# Patient Record
Sex: MALE | Race: WHITE | Employment: FULL TIME | URBAN - METROPOLITAN AREA
[De-identification: names, ages, dates, MRNs, and addresses within clinical notes are randomized per-mention and may not be internally consistent; named-entity substitution may affect disease eponyms.]

---

## 2017-01-25 ENCOUNTER — OFFICE VISIT (OUTPATIENT)
Dept: HEMATOLOGY/ONCOLOGY | Facility: HOSPITAL | Age: 62
End: 2017-01-25
Attending: INTERNAL MEDICINE
Payer: COMMERCIAL

## 2017-01-25 VITALS
TEMPERATURE: 98 F | HEART RATE: 84 BPM | WEIGHT: 204 LBS | BODY MASS INDEX: 29.2 KG/M2 | DIASTOLIC BLOOD PRESSURE: 89 MMHG | RESPIRATION RATE: 20 BRPM | HEIGHT: 70 IN | SYSTOLIC BLOOD PRESSURE: 140 MMHG

## 2017-01-25 DIAGNOSIS — C50.121 MALIGNANT NEOPLASM OF CENTRAL PORTION OF RIGHT MALE BREAST (HCC): Primary | ICD-10-CM

## 2017-01-25 PROCEDURE — 99214 OFFICE O/P EST MOD 30 MIN: CPT | Performed by: INTERNAL MEDICINE

## 2017-01-25 NOTE — PROGRESS NOTES
Pt here for follow up. He is taking tamoxifen. Pt complains of upper right back pain for the past 10  - 12 days.

## 2017-01-25 NOTE — PROGRESS NOTES
Cancer Center Progress Note    Problem List:      Patient Active Problem List:     Prostate cancer ([facility] [address]     Pain in joint, ankle and foot     Hyperlipidemia     Vitamin D deficiency     Insomnia     Hemangioma     Nephrolithiasis     Malignant neoplasm of scar is well healed with no evidence of recurrence. The left breast has no mass or skin lesion. Abdomen: Soft, non tender with good bowel sounds. Extremities: No edema. Neurological: Grossly intact. Lymphatics:  There is no palpable lymphadenopathy thr

## 2017-01-25 NOTE — PROGRESS NOTES
Education Record    Learner:  Patient    Disease / Diagnosis:    Barriers / Limitations:  None   Comments:    Method:  Brief focused   Comments:    General Topics:  medication   Comments:    Outcome:  Shows understanding   Comments:

## 2017-02-02 ENCOUNTER — HOSPITAL ENCOUNTER (OUTPATIENT)
Dept: MAMMOGRAPHY | Age: 62
Discharge: HOME | End: 2017-02-02
Attending: INTERNAL MEDICINE
Payer: COMMERCIAL

## 2017-02-02 DIAGNOSIS — C50.121 MALIGNANT NEOPLASM OF CENTRAL PORTION OF RIGHT MALE BREAST (HCC): ICD-10-CM

## 2017-02-02 PROCEDURE — 77061 BREAST TOMOSYNTHESIS UNI: CPT

## 2017-02-02 PROCEDURE — 77065 DX MAMMO INCL CAD UNI: CPT

## 2017-03-23 ENCOUNTER — OFFICE VISIT (OUTPATIENT)
Dept: FAMILY MEDICINE CLINIC | Facility: CLINIC | Age: 62
End: 2017-03-23

## 2017-03-23 VITALS
BODY MASS INDEX: 31.04 KG/M2 | DIASTOLIC BLOOD PRESSURE: 72 MMHG | TEMPERATURE: 98 F | HEART RATE: 62 BPM | HEIGHT: 69.25 IN | WEIGHT: 212 LBS | SYSTOLIC BLOOD PRESSURE: 118 MMHG | RESPIRATION RATE: 14 BRPM

## 2017-03-23 DIAGNOSIS — F51.01 PRIMARY INSOMNIA: ICD-10-CM

## 2017-03-23 DIAGNOSIS — R06.83 SNORING: ICD-10-CM

## 2017-03-23 DIAGNOSIS — E55.9 VITAMIN D DEFICIENCY: ICD-10-CM

## 2017-03-23 DIAGNOSIS — E78.2 MIXED HYPERLIPIDEMIA: Primary | ICD-10-CM

## 2017-03-23 PROCEDURE — 99214 OFFICE O/P EST MOD 30 MIN: CPT | Performed by: FAMILY MEDICINE

## 2017-03-23 RX ORDER — ZOLPIDEM TARTRATE 12.5 MG/1
TABLET, FILM COATED, EXTENDED RELEASE ORAL
Qty: 30 TABLET | Refills: 0 | Status: CANCELLED | OUTPATIENT
Start: 2017-03-23

## 2017-03-23 RX ORDER — ZOLPIDEM TARTRATE 12.5 MG/1
12.5 TABLET, FILM COATED, EXTENDED RELEASE ORAL NIGHTLY PRN
Qty: 30 TABLET | Refills: 5 | Status: SHIPPED | OUTPATIENT
Start: 2017-03-23 | End: 2017-09-22

## 2017-03-23 NOTE — PROGRESS NOTES
HPI:   Patient presents with:  Hyperlipidemia: 6 month f/u   Bump: Pt had a bumpd removed for biopsy on left thigh and still has scab and would like scab looked at. Jessica Restrepo is a 62year old male who presents for recheck of his hypertension.  H neoplasm of breast on his problem list.   He  has past surgical history that includes leg/ankle surgery proc unlisted (1978 1989); hand/finger surgery unlisted; femur/knee surg unlisted; shoulder surg proc unlisted (2005); repair of biceps tendon at elbow time.  Healing lesion on thigh stable    ASSESSMENT AND PLAN:   Diogenes Bryson is a 62year old male who presents for a recheck of his hypertension, Blood Pressure is well controlled, no significant medication side effects noted.      PLAN: will continue p

## 2017-03-24 NOTE — ASSESSMENT & PLAN NOTE
Stable, Continue present management.     Cholesterol Lowering Medications          Atorvastatin Calcium 40 MG Oral Tab

## 2017-04-04 ENCOUNTER — TELEPHONE (OUTPATIENT)
Dept: HEMATOLOGY/ONCOLOGY | Facility: HOSPITAL | Age: 62
End: 2017-04-04

## 2017-04-04 NOTE — TELEPHONE ENCOUNTER
Pt states he is getting a skin cancer taken out on his right shoulder and right forearm and that is the same side he had the breast cancer. Does he need to take any precautions?

## 2017-04-05 NOTE — TELEPHONE ENCOUNTER
He should have routine management. He is at higher risk for lymphedema but we will just need to follow for this and intervene with lymphedema clinic if necessary. -- per Dr. Waylon Dotson    Pt informed and verbalized understanding.

## 2017-04-11 ENCOUNTER — OFFICE VISIT (OUTPATIENT)
Dept: SLEEP CENTER | Facility: HOSPITAL | Age: 62
End: 2017-04-11
Attending: FAMILY MEDICINE
Payer: COMMERCIAL

## 2017-04-11 PROCEDURE — 95810 POLYSOM 6/> YRS 4/> PARAM: CPT

## 2017-04-13 NOTE — PROCEDURES
[address] [address]       Accredited by the [facility] of [facility] (AASM)    PATIENT'S NAME:        Zofia Pena  ATTENDING PHYSICIAN:   Eliana Myers M.D. REFERRING PHYSICIAN:   Aldair Rios M.D.   MARY ELLEN sleep, was seen in decreased amounts comprising 0.3% of sleep. Slow wave sleep was absent during this study. REM sleep was seen in decreased amounts comprising 16.6% of sleep.       RESPIRATORY MEASURES:  Respiratory monitoring showed obstructive apneas a

## 2017-04-25 ENCOUNTER — MED REC SCAN ONLY (OUTPATIENT)
Dept: FAMILY MEDICINE CLINIC | Facility: CLINIC | Age: 62
End: 2017-04-25

## 2017-05-24 ENCOUNTER — OFFICE VISIT (OUTPATIENT)
Dept: FAMILY MEDICINE CLINIC | Facility: CLINIC | Age: 62
End: 2017-05-24

## 2017-05-24 VITALS
HEIGHT: 69 IN | HEART RATE: 72 BPM | TEMPERATURE: 98 F | DIASTOLIC BLOOD PRESSURE: 80 MMHG | WEIGHT: 214 LBS | RESPIRATION RATE: 16 BRPM | SYSTOLIC BLOOD PRESSURE: 118 MMHG | BODY MASS INDEX: 31.7 KG/M2

## 2017-05-24 DIAGNOSIS — G47.33 OSA ON CPAP: Primary | ICD-10-CM

## 2017-05-24 DIAGNOSIS — J01.01 ACUTE RECURRENT MAXILLARY SINUSITIS: ICD-10-CM

## 2017-05-24 DIAGNOSIS — Z99.89 OSA ON CPAP: Primary | ICD-10-CM

## 2017-05-24 PROCEDURE — 99213 OFFICE O/P EST LOW 20 MIN: CPT | Performed by: FAMILY MEDICINE

## 2017-05-24 RX ORDER — CEFUROXIME AXETIL 250 MG/1
250 TABLET ORAL 2 TIMES DAILY
Qty: 20 TABLET | Refills: 0 | Status: SHIPPED | OUTPATIENT
Start: 2017-05-24 | End: 2017-06-16

## 2017-05-24 NOTE — PROGRESS NOTES
Patient presents with:  Test Results: Pt is here to discuss sleep study results  Sinus Problem: x1-2 months w/ pain above the gum line and below the eye and sometimes wakes up with left eye crusty.       HPI:   This is a 62year old male coming in for sleep nystagmus  Neck: Supple, no obvious thyromegaly   Cardiac: S1, S2 normal,    Lungs: good chest wall expansion, clear   Abdomen: Soft, non-tender.  No palpable organs or masses   Extremities: No edema, peripheral pulses +2   Neurologic: mental status grossly

## 2017-05-24 NOTE — ASSESSMENT & PLAN NOTE
Long discussion about treatment for sleep apnea, I will arrange for a CPAP titration and he is ready for CPAP because of side effects.

## 2017-06-11 ENCOUNTER — OFFICE VISIT (OUTPATIENT)
Dept: SLEEP CENTER | Facility: HOSPITAL | Age: 62
End: 2017-06-11
Attending: FAMILY MEDICINE
Payer: COMMERCIAL

## 2017-06-11 PROCEDURE — 95811 POLYSOM 6/>YRS CPAP 4/> PARM: CPT

## 2017-06-15 NOTE — PROCEDURES
[address] [address]       Accredited by the [facility] of [facility] Medicine (AASM)    PATIENT'S NAME:        Shana Riley  ATTENDING PHYSICIAN:   Akil Kirkland M.D. REFERRING PHYSICIAN:   Barbie Syed M.D.   MARY ELLEN was normal at 13 minutes. The amount of stage 1 or light sleep was seen in decreased amounts as it was not observed during this night's study. Slow-wave sleep was seen in decreased amounts comprising 10.3% of sleep.   REM sleep was seen in decreased amoun 16:01:24  [facility] [id number]  AK/    cc: Neal Rodriguez M.D.

## 2017-07-07 ENCOUNTER — OFFICE VISIT (OUTPATIENT)
Dept: SURGERY | Facility: CLINIC | Age: 62
End: 2017-07-07

## 2017-07-07 ENCOUNTER — TELEPHONE (OUTPATIENT)
Dept: FAMILY MEDICINE CLINIC | Facility: CLINIC | Age: 62
End: 2017-07-07

## 2017-07-07 ENCOUNTER — OFFICE VISIT (OUTPATIENT)
Dept: FAMILY MEDICINE CLINIC | Facility: CLINIC | Age: 62
End: 2017-07-07

## 2017-07-07 VITALS
OXYGEN SATURATION: 96 % | DIASTOLIC BLOOD PRESSURE: 80 MMHG | HEART RATE: 80 BPM | BODY MASS INDEX: 33 KG/M2 | SYSTOLIC BLOOD PRESSURE: 128 MMHG | RESPIRATION RATE: 18 BRPM | TEMPERATURE: 99 F | WEIGHT: 220.19 LBS

## 2017-07-07 VITALS
HEIGHT: 70 IN | HEART RATE: 80 BPM | WEIGHT: 220 LBS | DIASTOLIC BLOOD PRESSURE: 94 MMHG | SYSTOLIC BLOOD PRESSURE: 132 MMHG | RESPIRATION RATE: 14 BRPM | TEMPERATURE: 99 F | BODY MASS INDEX: 31.5 KG/M2

## 2017-07-07 DIAGNOSIS — R39.9 UTI SYMPTOMS: Primary | ICD-10-CM

## 2017-07-07 DIAGNOSIS — J01.01 ACUTE RECURRENT MAXILLARY SINUSITIS: ICD-10-CM

## 2017-07-07 DIAGNOSIS — C50.121 MALIGNANT NEOPLASM OF CENTRAL PORTION OF RIGHT BREAST IN MALE, ESTROGEN RECEPTOR POSITIVE (HCC): ICD-10-CM

## 2017-07-07 DIAGNOSIS — Z12.31 ENCOUNTER FOR SCREENING MAMMOGRAM FOR MALIGNANT NEOPLASM OF BREAST: Primary | ICD-10-CM

## 2017-07-07 DIAGNOSIS — Z17.0 MALIGNANT NEOPLASM OF CENTRAL PORTION OF RIGHT BREAST IN MALE, ESTROGEN RECEPTOR POSITIVE (HCC): ICD-10-CM

## 2017-07-07 LAB
BILIRUBIN: NEGATIVE
GLUCOSE (URINE DIPSTICK): NEGATIVE MG/DL
KETONES (URINE DIPSTICK): NEGATIVE MG/DL
MULTISTIX LOT#: ABNORMAL NUMERIC
NITRITE, URINE: NEGATIVE
PH, URINE: 6.5 (ref 4.5–8)
PROTEIN (URINE DIPSTICK): 30 MG/DL
SPECIFIC GRAVITY: 1.01 (ref 1–1.03)
UROBILINOGEN,SEMI-QN: 0.2 MG/DL (ref 0–1.9)

## 2017-07-07 PROCEDURE — 81003 URINALYSIS AUTO W/O SCOPE: CPT | Performed by: NURSE PRACTITIONER

## 2017-07-07 PROCEDURE — 99213 OFFICE O/P EST LOW 20 MIN: CPT | Performed by: NURSE PRACTITIONER

## 2017-07-07 PROCEDURE — 87086 URINE CULTURE/COLONY COUNT: CPT | Performed by: NURSE PRACTITIONER

## 2017-07-07 PROCEDURE — 87186 SC STD MICRODIL/AGAR DIL: CPT | Performed by: NURSE PRACTITIONER

## 2017-07-07 PROCEDURE — 87088 URINE BACTERIA CULTURE: CPT | Performed by: NURSE PRACTITIONER

## 2017-07-07 PROCEDURE — 99214 OFFICE O/P EST MOD 30 MIN: CPT | Performed by: SURGERY

## 2017-07-07 RX ORDER — SULFAMETHOXAZOLE AND TRIMETHOPRIM 800; 160 MG/1; MG/1
1 TABLET ORAL 2 TIMES DAILY
Qty: 28 TABLET | Refills: 0 | Status: SHIPPED | OUTPATIENT
Start: 2017-07-07 | End: 2017-07-21

## 2017-07-07 NOTE — PROGRESS NOTES
CHIEF COMPLAINT:   Patient presents with:  UTI: symptoms x 4 days      HPI:   Jesse Gale is a 62year old male who presents with symptoms of UTI.  Pt c/o mild bilateral low back pain, dysuria, urinary frequency, blood in urine, x 4 days, this am urin • Squamous cell carcinoma 4/8/2014    in-situ, mid upper chest   • Squamous cell carcinoma 9/26/2013    in-situ, left upper arm; in-situ, left forearm   • Squamous cell carcinoma 8/22/2013    in-situ, right posterior neck; in-situ, right upper arm      Soc [employer] Lot# [id number] Numeric   Multistix Expiration Date 10/2,017 Date         ASSESSMENT AND PLAN:   Diana Ashraf is a 62year old male presents with UTI symptoms.     ASSESSMENT:  Uti symptoms  (primary encounter diagnosis)  Acute recurrent maxillary Applying heat to the area surrounding your sinuses may make you feel more comfortable. Use a hot water bottle or a hand towel dipped in hot water. Some people also find ice packs effective for relieving pain.   Medicines  Your doctor may prescribe medicatio · Cystitis is usually caused by a UTI. · Not all UTIs and cases of cystitis are bladder infections. · Bladder infections are the most common type of cystitis. Symptoms of a bladder infection  The infection causes inflammation in the urethra and bladder. · You may have been given phenazopyridine to ease burning when you urinate. It will cause your urine to be bright orange. It can stain clothing. · You may have been prescribed antibiotics.  Take this medicine until you have finished it, even if you feel be Follow up with your healthcare provider, or as advised if all symptoms have not cleared up within 5 days. It is important to keep your follow-up appointment.  You can talk with your provider to see if you need more tests of the urinary tract. This is especi

## 2017-07-07 NOTE — PATIENT INSTRUCTIONS
Self-Care for Sinusitis     Drinking plenty of water can help sinuses drain. Sinusitis can often be managed with self-care. Self-care can keep sinuses moist and make you feel more comfortable. Remember to follow your doctor's instructions closely.  This Bladder Infection, Male (Adult)    You have a bladder infection. Urine is normally free of bacteria. But bacteria can get into the urinary tract from the skin around the rectum or it may travel in the blood from elsewhere in the body.   This is called a ur The most common cause of bladder infections is bacteria from the bowels. The bacteria get onto the skin around the opening of the urethra. From there they can get into the urine and travel up to the bladder. This causes inflammation and an infection.  This · Drink plenty of fluids, unless your healthcare provider told you not to. Fluids will prevent dehydration and flush out your bladder. · Use good personal hygiene. Wipe from front to back after using the toilet, and clean your penis regularly.  If you aren © 1240-9250 [address], [address] [place]. All rights reserved. This information is not intended as a substitute for professional medical care. Always follow your healthcare professional's instructions.

## 2017-07-07 NOTE — TELEPHONE ENCOUNTER
Patient woke up and there was blood in his urine, currently at the hospital and was wondering if he could get an order to have a urine sample done

## 2017-07-07 NOTE — TELEPHONE ENCOUNTER
Patient c/o lower back pain x 4 days - had blood in urine this morning. H/o prostate and breast ca. Patient wanting to come in to drop-off a urine sample. Pt advised we typically like to do an assessment before ordering testing.  Patient is willing to go to

## 2017-07-07 NOTE — PROGRESS NOTES
Breast Surgery Follow-Up Visit    Diagnosis: Infiltrating Ductal Carcinoma, right breast; ER positive, [place] positive status post modified radical mastectomy without reconstruction on December 15, 2015.     Stage: [id number] (Stage IB)    Disease Status:  Ramin Armas PROSTECTOMY   • Basal cell carcinoma 1/3/2015    left upper arm   • Basal cell carcinoma 12/12/2012    right shoulder; left forearm   • Basal cell carcinoma 06/19/2002    right forehead   • Calculus of kidney    • Personal history of arthritis    • Prostat Rfl: 11   VIAGRA 100 MG Oral Tab as needed. Disp:  Rfl: 11     No current facility-administered medications on file prior to visit. Allergies:      Levaquin [Levofloxa*    Swelling    Comment:Other reaction(s):  Other             Insomnia; swelling wheezing, difficulty in breathing with exertion, emphysema, chronic bronchitis, shortness of breast or abnormal sound when breathing. Cardiovascular:   There is no history of chest pain, chest pressure/discomfort, palpitations, irregular heartbeat, erwin anaphylaxis. /80 (BP Location: Left arm, Patient Position: Sitting, Cuff Size: adult)   Pulse 80   Temp 99 °F (37.2 °C) (Tympanic)   Resp 18   Wt 99.9 kg (220 lb 3.2 oz)   SpO2 96%   BMI 32.52 kg/m²     Physical Examination:   This is a well-nouris

## 2017-07-09 NOTE — PROGRESS NOTES
Called pt to discuss results. Pt feeling better on treatment. Will monitor and call/return to clinic if sx increase. Pt voiced understanding.

## 2017-07-23 DIAGNOSIS — C61 PROSTATE CANCER (HCC): Primary | ICD-10-CM

## 2017-07-24 RX ORDER — TAMOXIFEN CITRATE 20 MG/1
20 TABLET ORAL DAILY
Qty: 30 TABLET | Refills: 11 | Status: SHIPPED | OUTPATIENT
Start: 2017-07-24 | End: 2018-07-05

## 2017-07-26 ENCOUNTER — APPOINTMENT (OUTPATIENT)
Dept: HEMATOLOGY/ONCOLOGY | Facility: HOSPITAL | Age: 62
End: 2017-07-26
Attending: INTERNAL MEDICINE
Payer: COMMERCIAL

## 2017-07-28 ENCOUNTER — OFFICE VISIT (OUTPATIENT)
Dept: FAMILY MEDICINE CLINIC | Facility: CLINIC | Age: 62
End: 2017-07-28

## 2017-07-28 VITALS
WEIGHT: 211 LBS | HEIGHT: 70 IN | SYSTOLIC BLOOD PRESSURE: 136 MMHG | RESPIRATION RATE: 16 BRPM | TEMPERATURE: 99 F | BODY MASS INDEX: 30.21 KG/M2 | DIASTOLIC BLOOD PRESSURE: 80 MMHG | HEART RATE: 80 BPM

## 2017-07-28 DIAGNOSIS — E55.9 VITAMIN D DEFICIENCY: ICD-10-CM

## 2017-07-28 DIAGNOSIS — N45.1 EPIDIDYMITIS: Primary | ICD-10-CM

## 2017-07-28 DIAGNOSIS — Z00.00 LABORATORY EXAMINATION ORDERED AS PART OF A ROUTINE GENERAL MEDICAL EXAMINATION: ICD-10-CM

## 2017-07-28 DIAGNOSIS — J01.01 ACUTE RECURRENT MAXILLARY SINUSITIS: ICD-10-CM

## 2017-07-28 PROCEDURE — 99213 OFFICE O/P EST LOW 20 MIN: CPT | Performed by: FAMILY MEDICINE

## 2017-07-28 RX ORDER — AMOXICILLIN AND CLAVULANATE POTASSIUM 875; 125 MG/1; MG/1
1 TABLET, FILM COATED ORAL 2 TIMES DAILY
Qty: 28 TABLET | Refills: 0 | Status: SHIPPED | OUTPATIENT
Start: 2017-07-28 | End: 2017-08-11

## 2017-07-28 NOTE — PROGRESS NOTES
Patient presents with:  Sinus Problem: Pt states sinus infection has not gone away would like another round of antibiotics. Eval-G (gynecologic): x3 weeks w/ groing pain mostly on right side. Pt states pain is constant. Pain level 4-5/10.        HPI:   Jade Ro diaphoresis. HENT: Positive for sinus pressure. Negative for congestion, hoarse voice and sore throat. Respiratory: Negative for cough. Cardiovascular: Negative for chest pain. Gastrointestinal: Negative for nausea and anorexia.    Genitourinary: descended. Cremasteric reflex is not absent on the right side. Left testis shows no mass, no swelling and no tenderness. Left testis is descended. Cremasteric reflex is not absent on the left side. No penile erythema or penile tenderness.  No discharge foun

## 2017-08-02 ENCOUNTER — OFFICE VISIT (OUTPATIENT)
Dept: HEMATOLOGY/ONCOLOGY | Facility: HOSPITAL | Age: 62
End: 2017-08-02
Attending: INTERNAL MEDICINE
Payer: COMMERCIAL

## 2017-08-02 VITALS
RESPIRATION RATE: 20 BRPM | HEIGHT: 70 IN | SYSTOLIC BLOOD PRESSURE: 132 MMHG | WEIGHT: 219 LBS | OXYGEN SATURATION: 97 % | DIASTOLIC BLOOD PRESSURE: 83 MMHG | TEMPERATURE: 98 F | HEART RATE: 64 BPM | BODY MASS INDEX: 31.35 KG/M2

## 2017-08-02 DIAGNOSIS — C50.121 MALIGNANT NEOPLASM OF CENTRAL PORTION OF RIGHT BREAST IN MALE, ESTROGEN RECEPTOR POSITIVE (HCC): Primary | ICD-10-CM

## 2017-08-02 DIAGNOSIS — Z17.0 MALIGNANT NEOPLASM OF CENTRAL PORTION OF RIGHT BREAST IN MALE, ESTROGEN RECEPTOR POSITIVE (HCC): Primary | ICD-10-CM

## 2017-08-02 PROCEDURE — 99214 OFFICE O/P EST MOD 30 MIN: CPT | Performed by: INTERNAL MEDICINE

## 2017-08-02 NOTE — PROGRESS NOTES
Cancer Center Progress Note    Problem List:      Patient Active Problem List:     Prostate cancer ([facility] [address]     Pain in joint, ankle and foot     Hyperlipidemia     Vitamin D deficiency     Insomnia     Hemangioma     Nephrolithiasis     Malignant neoplasm of TABLET (20 MG TOTAL) BY MOUTH DAILY. Disp: 30 tablet Rfl: 11   Zolpidem Tartrate ER (AMBIEN CR) 12.5 MG Oral Tab CR Take 1 tablet (12.5 mg total) by mouth nightly as needed for Sleep.  Disp: 30 tablet Rfl: 5   Atorvastatin Calcium 40 MG Oral Tab Take 1 tabl has a personal history of prostate cancer that was diagnosed after getting screening PSA's. The PSA had increased to 7.5. He had a radical prostatectomy in 1/2015. He had a Gleasons 7 (3+4). He had 14 negative nodes. The margins were negative.  This was st

## 2017-08-02 NOTE — PROGRESS NOTES
Pt here for follow up. Pt is on tamoxifen. He is currently on antibiotics for epididymis. Pt states he has chest pain and numbness in the arm pit.

## 2017-08-09 ENCOUNTER — LAB ENCOUNTER (OUTPATIENT)
Dept: LAB | Age: 62
End: 2017-08-09
Attending: FAMILY MEDICINE
Payer: COMMERCIAL

## 2017-08-09 ENCOUNTER — OFFICE VISIT (OUTPATIENT)
Dept: FAMILY MEDICINE CLINIC | Facility: CLINIC | Age: 62
End: 2017-08-09

## 2017-08-09 VITALS
DIASTOLIC BLOOD PRESSURE: 84 MMHG | TEMPERATURE: 99 F | BODY MASS INDEX: 31 KG/M2 | SYSTOLIC BLOOD PRESSURE: 130 MMHG | RESPIRATION RATE: 14 BRPM | HEART RATE: 72 BPM | WEIGHT: 218 LBS

## 2017-08-09 DIAGNOSIS — E55.9 VITAMIN D DEFICIENCY: ICD-10-CM

## 2017-08-09 DIAGNOSIS — G47.33 OSA ON CPAP: Primary | ICD-10-CM

## 2017-08-09 DIAGNOSIS — Z00.00 LABORATORY EXAMINATION ORDERED AS PART OF A ROUTINE GENERAL MEDICAL EXAMINATION: ICD-10-CM

## 2017-08-09 DIAGNOSIS — Z99.89 OSA ON CPAP: Primary | ICD-10-CM

## 2017-08-09 DIAGNOSIS — D70.9 NEUTROPENIA, UNSPECIFIED TYPE (HCC): ICD-10-CM

## 2017-08-09 LAB
25-HYDROXYVITAMIN D (TOTAL): 16.1 NG/ML (ref 30–100)
ALBUMIN SERPL-MCNC: 3.9 G/DL (ref 3.5–4.8)
ALP LIVER SERPL-CCNC: 52 U/L (ref 45–117)
ALT SERPL-CCNC: 32 U/L (ref 17–63)
AST SERPL-CCNC: 22 U/L (ref 15–41)
BASOPHILS # BLD AUTO: 0.03 X10(3) UL (ref 0–0.1)
BASOPHILS NFR BLD AUTO: 0.9 %
BILIRUB SERPL-MCNC: 1.2 MG/DL (ref 0.1–2)
BUN BLD-MCNC: 14 MG/DL (ref 8–20)
CALCIUM BLD-MCNC: 8.3 MG/DL (ref 8.3–10.3)
CHLORIDE: 108 MMOL/L (ref 101–111)
CHOLEST SMN-MCNC: 132 MG/DL (ref ?–200)
CO2: 28 MMOL/L (ref 22–32)
CREAT BLD-MCNC: 1.13 MG/DL (ref 0.7–1.3)
EOSINOPHIL # BLD AUTO: 0.17 X10(3) UL (ref 0–0.3)
EOSINOPHIL NFR BLD AUTO: 5.2 %
ERYTHROCYTE [DISTWIDTH] IN BLOOD BY AUTOMATED COUNT: 13.1 % (ref 11.5–16)
GLUCOSE BLD-MCNC: 92 MG/DL (ref 70–99)
HCT VFR BLD AUTO: 45.1 % (ref 37–53)
HDLC SERPL-MCNC: 45 MG/DL (ref 45–?)
HDLC SERPL: 2.93 {RATIO} (ref ?–4.97)
HGB BLD-MCNC: 15.1 G/DL (ref 13–17)
IMMATURE GRANULOCYTE COUNT: 0.04 X10(3) UL (ref 0–1)
IMMATURE GRANULOCYTE RATIO %: 1.2 %
LDLC SERPL CALC-MCNC: 66 MG/DL (ref ?–130)
LDLC SERPL-MCNC: 21 MG/DL (ref 5–40)
LYMPHOCYTES # BLD AUTO: 0.65 X10(3) UL (ref 0.9–4)
LYMPHOCYTES NFR BLD AUTO: 19.8 %
M PROTEIN MFR SERPL ELPH: 6.8 G/DL (ref 6.1–8.3)
MCH RBC QN AUTO: 31.2 PG (ref 27–33.2)
MCHC RBC AUTO-ENTMCNC: 33.5 G/DL (ref 31–37)
MCV RBC AUTO: 93.2 FL (ref 80–99)
MONOCYTES # BLD AUTO: 0.51 X10(3) UL (ref 0.1–0.6)
MONOCYTES NFR BLD AUTO: 15.5 %
NEUTROPHIL ABS PRELIM: 1.88 X10 (3) UL (ref 1.3–6.7)
NEUTROPHILS # BLD AUTO: 1.88 X10(3) UL (ref 1.3–6.7)
NEUTROPHILS NFR BLD AUTO: 57.4 %
NONHDLC SERPL-MCNC: 87 MG/DL (ref ?–130)
PLATELET # BLD AUTO: 119 10(3)UL (ref 150–450)
POTASSIUM SERPL-SCNC: 4 MMOL/L (ref 3.6–5.1)
RBC # BLD AUTO: 4.84 X10(6)UL (ref 4.3–5.7)
RED CELL DISTRIBUTION WIDTH-SD: 44.2 FL (ref 35.1–46.3)
SODIUM SERPL-SCNC: 142 MMOL/L (ref 136–144)
TRIGLYCERIDES: 106 MG/DL (ref ?–150)
TSI SER-ACNC: 2.81 MIU/ML (ref 0.35–5.5)
WBC # BLD AUTO: 3.3 X10(3) UL (ref 4–13)

## 2017-08-09 PROCEDURE — 36415 COLL VENOUS BLD VENIPUNCTURE: CPT | Performed by: FAMILY MEDICINE

## 2017-08-09 PROCEDURE — 80061 LIPID PANEL: CPT | Performed by: FAMILY MEDICINE

## 2017-08-09 PROCEDURE — 99213 OFFICE O/P EST LOW 20 MIN: CPT | Performed by: FAMILY MEDICINE

## 2017-08-09 PROCEDURE — 82306 VITAMIN D 25 HYDROXY: CPT | Performed by: FAMILY MEDICINE

## 2017-08-09 PROCEDURE — 80050 GENERAL HEALTH PANEL: CPT | Performed by: FAMILY MEDICINE

## 2017-08-09 NOTE — PROGRESS NOTES
Patient presents with:  Sleep Problem: Pt is here to f/u chaparrita having CPAP for 6 weeks. HPI:   This is a 62year old male coming in for PRICILA, not snoring, wife sleeping. Doing better and very compliant.  DOing very well, noticing some difference   Sinu PRICILA on CPAP - Primary    Overview     4/2017 study, Ger 77 %, needs tx, AHI 35 supine, and 7 side           Current Assessment & Plan     Compliant and             Digestive    Vitamin D deficiency    Overview     Low vit D.  On supplements         Rel

## 2017-08-31 ENCOUNTER — HOSPITAL ENCOUNTER (OUTPATIENT)
Dept: CT IMAGING | Age: 62
Discharge: HOME | End: 2017-08-31
Attending: OTOLARYNGOLOGY
Payer: COMMERCIAL

## 2017-08-31 DIAGNOSIS — R51.9 FACIAL PAIN: ICD-10-CM

## 2017-08-31 DIAGNOSIS — J32.0 CHRONIC MAXILLARY SINUSITIS: ICD-10-CM

## 2017-08-31 PROCEDURE — 70486 CT MAXILLOFACIAL W/O DYE: CPT | Performed by: OTOLARYNGOLOGY

## 2017-09-05 NOTE — PROGRESS NOTES
Please inform CT sinuses showing right maxillary sinus polyp with some inflammation, please find out how patient is doing with astelin twice daily, follow up in office with Dr Jessica Torrez to re examine with films

## 2017-09-12 NOTE — PROGRESS NOTES
Spoke with pt in regards to CT, pt has appt scheduled with Richie Fay on 9/14/17 @ 7:30am    Pt saw Dr. Argentina Car on 9/8/17, referred by Dr. Solomon Gandara for lip hemangioma, I did see in Dr. Etienne Headings note that pt asked about CT results and Dr. Argentina Car did go over the

## 2017-09-17 DIAGNOSIS — E78.2 MIXED HYPERLIPIDEMIA: ICD-10-CM

## 2017-09-18 RX ORDER — ATORVASTATIN CALCIUM 40 MG/1
40 TABLET, FILM COATED ORAL DAILY
Qty: 30 TABLET | Refills: 10 | Status: SHIPPED | OUTPATIENT
Start: 2017-09-18 | End: 2018-08-06

## 2017-09-18 NOTE — TELEPHONE ENCOUNTER
Signed Prescriptions Disp Refills    ATORVASTATIN 40 MG Oral Tab 30 tablet 10      Sig: TAKE 1 TABLET (40 MG TOTAL) BY MOUTH DAILY.         Authorizing Provider: Dago Cunningham        Ordering User: Mateo BARRAGAN 8/9/2017     Future Appointm

## 2017-09-21 ENCOUNTER — TELEPHONE (OUTPATIENT)
Dept: FAMILY MEDICINE CLINIC | Facility: CLINIC | Age: 62
End: 2017-09-21

## 2017-09-22 ENCOUNTER — OFFICE VISIT (OUTPATIENT)
Dept: FAMILY MEDICINE CLINIC | Facility: CLINIC | Age: 62
End: 2017-09-22

## 2017-09-22 VITALS
HEART RATE: 84 BPM | WEIGHT: 218 LBS | DIASTOLIC BLOOD PRESSURE: 62 MMHG | HEIGHT: 69.25 IN | RESPIRATION RATE: 14 BRPM | SYSTOLIC BLOOD PRESSURE: 128 MMHG | TEMPERATURE: 99 F | BODY MASS INDEX: 31.92 KG/M2

## 2017-09-22 DIAGNOSIS — F51.01 PRIMARY INSOMNIA: ICD-10-CM

## 2017-09-22 DIAGNOSIS — D69.6 THROMBOCYTOPENIA (HCC): ICD-10-CM

## 2017-09-22 DIAGNOSIS — E55.9 VITAMIN D DEFICIENCY: ICD-10-CM

## 2017-09-22 DIAGNOSIS — E78.2 MIXED HYPERLIPIDEMIA: ICD-10-CM

## 2017-09-22 DIAGNOSIS — C61 PROSTATE CANCER (HCC): ICD-10-CM

## 2017-09-22 DIAGNOSIS — Z00.00 ANNUAL PHYSICAL EXAM: Primary | ICD-10-CM

## 2017-09-22 DIAGNOSIS — G47.33 OSA ON CPAP: ICD-10-CM

## 2017-09-22 DIAGNOSIS — Z99.89 OSA ON CPAP: ICD-10-CM

## 2017-09-22 PROCEDURE — 99396 PREV VISIT EST AGE 40-64: CPT | Performed by: FAMILY MEDICINE

## 2017-09-22 RX ORDER — ZOLPIDEM TARTRATE 12.5 MG/1
12.5 TABLET, FILM COATED, EXTENDED RELEASE ORAL NIGHTLY PRN
Qty: 30 TABLET | Refills: 5 | Status: SHIPPED | OUTPATIENT
Start: 2017-09-22 | End: 2018-03-16

## 2017-09-22 NOTE — PROGRESS NOTES
Batsheva Alvares is a 62year old male who presents for a complete physical exam.   HPI:   Patient presents with:  Physical      His last annual assessment has been over 1 year: Annual Physical due on 09/22/2017       Pt complains of sinus sx and swollen li VITD 16.1 (L) 08/09/2017 08:13 AM   PSA 5.530 (H) 04/23/2014 08:39 AM              Current Outpatient Prescriptions on File Prior to Visit:  ATORVASTATIN 40 MG Oral Tab TAKE 1 TABLET (40 MG TOTAL) BY MOUTH DAILY.    Hydrocortisone-Acetic Acid 1-2 % Otic Sol He is not on any long-term medications. He is allergic to levaquin [levofloxacin]. He  reports that he has never smoked. He has never used smokeless tobacco. He reports that he drinks about 1.2 - 1.8 oz of alcohol per week .  He reports that he does Cardiovascular: Normal rate, regular rhythm, S1 normal, S2 normal, normal heart sounds, intact distal pulses and normal pulses. PMI is not displaced. Exam reveals no gallop. No murmur heard. Carotid bruit not present.   Pulmonary/Chest: Effort normal an Atorvastatin 40         Current Assessment & Plan     Stable, Continue present management.     Cholesterol Lowering Medications          ATORVASTATIN 40 MG Oral Tab                    Respiratory    PRICILA on CPAP    Overview     4/2017 study, Ger 77 %, ne

## 2017-11-21 ENCOUNTER — HOSPITAL ENCOUNTER (OUTPATIENT)
Age: 62
Discharge: HOME | End: 2017-11-21
Attending: FAMILY MEDICINE
Payer: COMMERCIAL

## 2017-11-21 VITALS
DIASTOLIC BLOOD PRESSURE: 91 MMHG | TEMPERATURE: 98 F | OXYGEN SATURATION: 98 % | HEIGHT: 70 IN | HEART RATE: 86 BPM | SYSTOLIC BLOOD PRESSURE: 140 MMHG | WEIGHT: 211 LBS | BODY MASS INDEX: 30.21 KG/M2 | RESPIRATION RATE: 18 BRPM

## 2017-11-21 DIAGNOSIS — N12 PYELONEPHRITIS: Primary | ICD-10-CM

## 2017-11-21 DIAGNOSIS — Z87.442 HISTORY OF KIDNEY STONES: ICD-10-CM

## 2017-11-21 DIAGNOSIS — Z98.890 HISTORY OF PROSTATE SURGERY: ICD-10-CM

## 2017-11-21 PROCEDURE — 87086 URINE CULTURE/COLONY COUNT: CPT | Performed by: FAMILY MEDICINE

## 2017-11-21 PROCEDURE — 81002 URINALYSIS NONAUTO W/O SCOPE: CPT | Performed by: FAMILY MEDICINE

## 2017-11-21 PROCEDURE — 99204 OFFICE O/P NEW MOD 45 MIN: CPT

## 2017-11-21 PROCEDURE — 99214 OFFICE O/P EST MOD 30 MIN: CPT

## 2017-11-21 RX ORDER — SULFAMETHOXAZOLE AND TRIMETHOPRIM 800; 160 MG/1; MG/1
1 TABLET ORAL 2 TIMES DAILY
Qty: 20 TABLET | Refills: 0 | Status: SHIPPED | OUTPATIENT
Start: 2017-11-21 | End: 2017-12-01

## 2017-11-21 NOTE — ED PROVIDER NOTES
Patient Seen in: [facility] In [facility]    History   Patient presents with:  Urinary Symptoms (urologic)  Cough/URI    Stated Complaint: poss urinary issue,4 days    HPI  This is a 80-year-old male coming in with complains of right lower ba MD;  Location: [facility] ENDOSCOPY  12/5/16: COLONOSCOPY & POLYPECTOMY      Comment: small polyp at prior polypectomy site   No date: FEMUR/KNEE SURG UNLISTED      Comment: right knee  12/2012: FOOT/TOES SURGERY PROC UNLISTED      Comment: Tarsil fusion, 8 screws supple  Back: Mild tenderness noted over the right lower flank region  LUNGS: CTAB, no RRW  CV: RRR  ABD: Suprapubic tenderness-present, no guarding, no rebound, no HSM not distended  NEURO: Alert and oriented to person place and time  GAIT: Normal   GENIT

## 2017-11-21 NOTE — ED INITIAL ASSESSMENT (HPI)
Pt here with c/o lower right back pain, pain in his right testicle, burning with urination. Denies fever. Also c/o sinus pain.

## 2018-01-16 DIAGNOSIS — C50.121 MALIGNANT NEOPLASM OF CENTRAL PORTION OF RIGHT BREAST IN MALE, ESTROGEN RECEPTOR POSITIVE (HCC): Primary | ICD-10-CM

## 2018-01-16 DIAGNOSIS — Z17.0 MALIGNANT NEOPLASM OF CENTRAL PORTION OF RIGHT BREAST IN MALE, ESTROGEN RECEPTOR POSITIVE (HCC): Primary | ICD-10-CM

## 2018-01-19 ENCOUNTER — HOSPITAL ENCOUNTER (OUTPATIENT)
Dept: MAMMOGRAPHY | Age: 63
Discharge: HOME | End: 2018-01-19
Attending: SURGERY
Payer: COMMERCIAL

## 2018-01-19 DIAGNOSIS — Z12.31 ENCOUNTER FOR SCREENING MAMMOGRAM FOR MALIGNANT NEOPLASM OF BREAST: ICD-10-CM

## 2018-01-19 PROCEDURE — 77065 DX MAMMO INCL CAD UNI: CPT | Performed by: SURGERY

## 2018-01-19 PROCEDURE — 77062 BREAST TOMOSYNTHESIS BI: CPT | Performed by: SURGERY

## 2018-01-19 PROCEDURE — 77066 DX MAMMO INCL CAD BI: CPT | Performed by: SURGERY

## 2018-01-19 PROCEDURE — 77061 BREAST TOMOSYNTHESIS UNI: CPT | Performed by: SURGERY

## 2018-02-07 ENCOUNTER — OFFICE VISIT (OUTPATIENT)
Dept: HEMATOLOGY/ONCOLOGY | Facility: HOSPITAL | Age: 63
End: 2018-02-07
Attending: INTERNAL MEDICINE
Payer: COMMERCIAL

## 2018-02-07 VITALS
TEMPERATURE: 97 F | SYSTOLIC BLOOD PRESSURE: 133 MMHG | DIASTOLIC BLOOD PRESSURE: 90 MMHG | HEART RATE: 78 BPM | WEIGHT: 219 LBS | OXYGEN SATURATION: 93 % | HEIGHT: 70 IN | RESPIRATION RATE: 20 BRPM | BODY MASS INDEX: 31.35 KG/M2

## 2018-02-07 DIAGNOSIS — C50.121 MALIGNANT NEOPLASM OF CENTRAL PORTION OF RIGHT BREAST IN MALE, ESTROGEN RECEPTOR POSITIVE (HCC): Primary | ICD-10-CM

## 2018-02-07 DIAGNOSIS — Z17.0 MALIGNANT NEOPLASM OF CENTRAL PORTION OF RIGHT BREAST IN MALE, ESTROGEN RECEPTOR POSITIVE (HCC): Primary | ICD-10-CM

## 2018-02-07 PROCEDURE — 99214 OFFICE O/P EST MOD 30 MIN: CPT | Performed by: INTERNAL MEDICINE

## 2018-02-07 NOTE — PROGRESS NOTES
Cancer Center Progress Note    Problem List:      Patient Active Problem List:     Prostate cancer ([facility] [address]     Pain in joint, ankle and foot     Hyperlipidemia     Vitamin D deficiency     Insomnia     Hemangioma     Nephrolithiasis     Malignant neoplasm of MOUTH DAILY. Disp: 30 tablet Rfl: 10   TAMOXIFEN CITRATE 20 MG Oral Tab TAKE 1 TABLET (20 MG TOTAL) BY MOUTH DAILY. Disp: 30 tablet Rfl: 11   VIAGRA 100 MG Oral Tab as needed.    Disp:  Rfl: 11         Vital Signs:      Height: 177.8 cm (5' 10\") (02/07 075 complaints prior to the next visit. I is getting a diagnostic mammogram of the left. We will get this yearly. Prostate Cancer: The patient has a personal history of prostate cancer that was diagnosed after getting screening PSA's.  The PSA had increa

## 2018-02-09 ENCOUNTER — HOSPITAL ENCOUNTER (OUTPATIENT)
Dept: CT IMAGING | Age: 63
Discharge: HOME | End: 2018-02-09
Attending: OTOLARYNGOLOGY
Payer: COMMERCIAL

## 2018-02-09 DIAGNOSIS — J30.1 SEASONAL ALLERGIC RHINITIS DUE TO POLLEN: ICD-10-CM

## 2018-02-09 DIAGNOSIS — J32.0 CHRONIC MAXILLARY SINUSITIS: ICD-10-CM

## 2018-02-09 PROCEDURE — 70486 CT MAXILLOFACIAL W/O DYE: CPT | Performed by: OTOLARYNGOLOGY

## 2018-02-12 NOTE — PROGRESS NOTES
Please inform ct sinuses showing mild improvement to sinuses, still with right sided maxillary sinus inflammation present, recommend follow up as planned continue with xlear and patient is follow up sooner with any increased symptoms

## 2018-03-09 ENCOUNTER — LAB ENCOUNTER (OUTPATIENT)
Dept: LAB | Age: 63
End: 2018-03-09
Attending: FAMILY MEDICINE
Payer: COMMERCIAL

## 2018-03-09 DIAGNOSIS — D69.6 THROMBOCYTOPENIA (HCC): ICD-10-CM

## 2018-03-09 DIAGNOSIS — E55.9 VITAMIN D DEFICIENCY: ICD-10-CM

## 2018-03-09 DIAGNOSIS — C61 PROSTATE CANCER (HCC): ICD-10-CM

## 2018-03-09 DIAGNOSIS — Z00.00 LABORATORY EXAMINATION ORDERED AS PART OF A ROUTINE GENERAL MEDICAL EXAMINATION: ICD-10-CM

## 2018-03-09 DIAGNOSIS — D70.9 NEUTROPENIA, UNSPECIFIED TYPE (HCC): ICD-10-CM

## 2018-03-09 DIAGNOSIS — Z13.0 SCREENING FOR DISORDER OF BLOOD AND BLOOD-FORMING ORGANS: ICD-10-CM

## 2018-03-09 DIAGNOSIS — Z11.59 NEED FOR HEPATITIS C SCREENING TEST: ICD-10-CM

## 2018-03-09 DIAGNOSIS — Z72.89 OTHER PROBLEMS RELATED TO LIFESTYLE: ICD-10-CM

## 2018-03-09 LAB
25-HYDROXYVITAMIN D (TOTAL): 29.1 NG/ML (ref 30–100)
ALBUMIN SERPL-MCNC: 3.8 G/DL (ref 3.5–4.8)
ALP LIVER SERPL-CCNC: 51 U/L (ref 45–117)
ALT SERPL-CCNC: 34 U/L (ref 17–63)
AST SERPL-CCNC: 27 U/L (ref 15–41)
BASOPHILS # BLD AUTO: 0.03 X10(3) UL (ref 0–0.1)
BASOPHILS NFR BLD AUTO: 0.8 %
BILIRUB SERPL-MCNC: 1.8 MG/DL (ref 0.1–2)
BUN BLD-MCNC: 12 MG/DL (ref 8–20)
CALCIUM BLD-MCNC: 8.5 MG/DL (ref 8.3–10.3)
CHLORIDE: 106 MMOL/L (ref 101–111)
CHOLEST SMN-MCNC: 96 MG/DL (ref ?–200)
CO2: 27 MMOL/L (ref 22–32)
CREAT BLD-MCNC: 1.13 MG/DL (ref 0.7–1.3)
EOSINOPHIL # BLD AUTO: 0.16 X10(3) UL (ref 0–0.3)
EOSINOPHIL NFR BLD AUTO: 4.3 %
ERYTHROCYTE [DISTWIDTH] IN BLOOD BY AUTOMATED COUNT: 12.7 % (ref 11.5–16)
GLUCOSE BLD-MCNC: 95 MG/DL (ref 70–99)
HCT VFR BLD AUTO: 47.3 % (ref 37–53)
HDLC SERPL-MCNC: 39 MG/DL (ref 45–?)
HDLC SERPL: 2.46 {RATIO} (ref ?–4.97)
HEPATITIS C VIRUS AB INTERPRETATION: NONREACTIVE
HGB BLD-MCNC: 16 G/DL (ref 13–17)
IMMATURE GRANULOCYTE COUNT: 0.07 X10(3) UL (ref 0–1)
IMMATURE GRANULOCYTE RATIO %: 1.9 %
LDLC SERPL CALC-MCNC: 35 MG/DL (ref ?–130)
LYMPHOCYTES # BLD AUTO: 0.8 X10(3) UL (ref 0.9–4)
LYMPHOCYTES NFR BLD AUTO: 21.3 %
M PROTEIN MFR SERPL ELPH: 6.7 G/DL (ref 6.1–8.3)
MCH RBC QN AUTO: 31.2 PG (ref 27–33.2)
MCHC RBC AUTO-ENTMCNC: 33.8 G/DL (ref 31–37)
MCV RBC AUTO: 92.2 FL (ref 80–99)
MONOCYTES # BLD AUTO: 0.65 X10(3) UL (ref 0.1–1)
MONOCYTES NFR BLD AUTO: 17.3 %
NEUTROPHIL ABS PRELIM: 2.05 X10 (3) UL (ref 1.3–6.7)
NEUTROPHILS # BLD AUTO: 2.05 X10(3) UL (ref 1.3–6.7)
NEUTROPHILS NFR BLD AUTO: 54.4 %
NONHDLC SERPL-MCNC: 57 MG/DL (ref ?–130)
PLATELET # BLD AUTO: 123 10(3)UL (ref 150–450)
POTASSIUM SERPL-SCNC: 4.4 MMOL/L (ref 3.6–5.1)
RBC # BLD AUTO: 5.13 X10(6)UL (ref 4.3–5.7)
RED CELL DISTRIBUTION WIDTH-SD: 43 FL (ref 35.1–46.3)
SODIUM SERPL-SCNC: 139 MMOL/L (ref 136–144)
TRIGL SERPL-MCNC: 109 MG/DL (ref ?–150)
TSI SER-ACNC: 2.45 MIU/ML (ref 0.35–5.5)
VLDLC SERPL CALC-MCNC: 22 MG/DL (ref 5–40)
WBC # BLD AUTO: 3.8 X10(3) UL (ref 4–13)

## 2018-03-09 PROCEDURE — 84153 ASSAY OF PSA TOTAL: CPT | Performed by: FAMILY MEDICINE

## 2018-03-09 PROCEDURE — 80050 GENERAL HEALTH PANEL: CPT | Performed by: FAMILY MEDICINE

## 2018-03-09 PROCEDURE — 82306 VITAMIN D 25 HYDROXY: CPT | Performed by: FAMILY MEDICINE

## 2018-03-09 PROCEDURE — 80061 LIPID PANEL: CPT | Performed by: FAMILY MEDICINE

## 2018-03-09 PROCEDURE — 36415 COLL VENOUS BLD VENIPUNCTURE: CPT | Performed by: FAMILY MEDICINE

## 2018-03-09 PROCEDURE — 86803 HEPATITIS C AB TEST: CPT | Performed by: FAMILY MEDICINE

## 2018-03-11 LAB — PSA ULTRA SENSITIVE: <0.01 NG/ML

## 2018-03-14 PROBLEM — N39.3 SUI (STRESS URINARY INCONTINENCE), MALE: Status: ACTIVE | Noted: 2018-01-21

## 2018-03-14 PROBLEM — N52.9 ED (ERECTILE DYSFUNCTION) OF ORGANIC ORIGIN: Status: ACTIVE | Noted: 2018-01-21

## 2018-03-16 ENCOUNTER — OFFICE VISIT (OUTPATIENT)
Dept: FAMILY MEDICINE CLINIC | Facility: CLINIC | Age: 63
End: 2018-03-16

## 2018-03-16 VITALS
HEART RATE: 84 BPM | WEIGHT: 219 LBS | SYSTOLIC BLOOD PRESSURE: 120 MMHG | HEIGHT: 69 IN | DIASTOLIC BLOOD PRESSURE: 70 MMHG | TEMPERATURE: 98 F | RESPIRATION RATE: 16 BRPM | BODY MASS INDEX: 32.44 KG/M2

## 2018-03-16 DIAGNOSIS — D69.6 THROMBOCYTOPENIA (HCC): ICD-10-CM

## 2018-03-16 DIAGNOSIS — F51.01 PRIMARY INSOMNIA: ICD-10-CM

## 2018-03-16 DIAGNOSIS — E78.2 MIXED HYPERLIPIDEMIA: Primary | ICD-10-CM

## 2018-03-16 PROCEDURE — 99213 OFFICE O/P EST LOW 20 MIN: CPT | Performed by: FAMILY MEDICINE

## 2018-03-16 RX ORDER — ZOLPIDEM TARTRATE 12.5 MG/1
12.5 TABLET, FILM COATED, EXTENDED RELEASE ORAL NIGHTLY PRN
Qty: 30 TABLET | Refills: 5 | Status: SHIPPED | OUTPATIENT
Start: 2018-03-16 | End: 2018-09-13

## 2018-03-16 RX ORDER — SULFAMETHOXAZOLE AND TRIMETHOPRIM 800; 160 MG/1; MG/1
TABLET ORAL 2 TIMES DAILY
COMMUNITY
Start: 2018-03-12 | End: 2018-06-11 | Stop reason: ALTCHOICE

## 2018-03-16 NOTE — PROGRESS NOTES
Patient presents with:  Sleep Problem: refill on Zolpidem      HPI:   This is a 65year old male coming in for insomina, seen 3/12 at [facility] for UTI, still dark urine. HPI     He  reports that he has never smoked.  He has never used smokeless tobacco. He r and vitals reviewed. Constitutional: He is oriented to person, place, and time. He appears well-developed and well-nourished. No distress. HENT:   Head: Normocephalic and atraumatic.    Right Ear: Hearing, tympanic membrane, external ear and ear canal n Cholesterol Lowering Medications          ATORVASTATIN 40 MG Oral Tab                    Neurologic    Insomnia    Overview     Zolpidem ER 12.5, severe adveres reaction of activation on trazodone in past         Current Assessment & Plan     Nothing els

## 2018-05-08 DIAGNOSIS — Z90.11 H/O MASTECTOMY, RIGHT: ICD-10-CM

## 2018-05-08 DIAGNOSIS — Z17.0 MALIGNANT NEOPLASM OF CENTRAL PORTION OF RIGHT BREAST IN MALE, ESTROGEN RECEPTOR POSITIVE (HCC): Primary | ICD-10-CM

## 2018-05-08 DIAGNOSIS — C50.121 MALIGNANT NEOPLASM OF CENTRAL PORTION OF RIGHT BREAST IN MALE, ESTROGEN RECEPTOR POSITIVE (HCC): Primary | ICD-10-CM

## 2018-06-05 ENCOUNTER — TELEPHONE (OUTPATIENT)
Dept: FAMILY MEDICINE CLINIC | Facility: CLINIC | Age: 63
End: 2018-06-05

## 2018-06-05 NOTE — TELEPHONE ENCOUNTER
Pt has been having symptoms or UTI for several weeks like he has cloudy urine/strong smelling urine and swollen lymph nodes in the groin area and he thinks he needs to see a Urologist and he is looking for a recommendation or direction on what to do next.

## 2018-06-07 ENCOUNTER — APPOINTMENT (OUTPATIENT)
Dept: CT IMAGING | Age: 63
End: 2018-06-07
Attending: FAMILY MEDICINE
Payer: COMMERCIAL

## 2018-06-07 ENCOUNTER — HOSPITAL ENCOUNTER (OUTPATIENT)
Age: 63
Discharge: HOME | End: 2018-06-07
Attending: FAMILY MEDICINE
Payer: COMMERCIAL

## 2018-06-07 VITALS
SYSTOLIC BLOOD PRESSURE: 148 MMHG | OXYGEN SATURATION: 95 % | RESPIRATION RATE: 16 BRPM | HEART RATE: 69 BPM | DIASTOLIC BLOOD PRESSURE: 92 MMHG | TEMPERATURE: 99 F

## 2018-06-07 DIAGNOSIS — R10.2 PELVIC PAIN IN MALE: ICD-10-CM

## 2018-06-07 DIAGNOSIS — R35.0 URINARY FREQUENCY: Primary | ICD-10-CM

## 2018-06-07 PROCEDURE — 99214 OFFICE O/P EST MOD 30 MIN: CPT

## 2018-06-07 PROCEDURE — 81002 URINALYSIS NONAUTO W/O SCOPE: CPT | Performed by: FAMILY MEDICINE

## 2018-06-07 PROCEDURE — 87086 URINE CULTURE/COLONY COUNT: CPT | Performed by: FAMILY MEDICINE

## 2018-06-07 PROCEDURE — 74176 CT ABD & PELVIS W/O CONTRAST: CPT | Performed by: FAMILY MEDICINE

## 2018-06-07 NOTE — TELEPHONE ENCOUNTER
Pt is calling again he still is waiting for a call back for a kidney or poss. UTI please call he is very uncomfortable.  Please call asap

## 2018-06-07 NOTE — TELEPHONE ENCOUNTER
Pt c/o low back pain, slight pain on urination, swollen lymph nodes in groin area x several days.  Pt has HX of prostate CA  Reviewed with Dr. Ritesh Oneill- advised to go to Urgent Care  Reviewed with Pt Dr. Ritesh Oneill recommendation- Pt verbalized understanding

## 2018-06-08 NOTE — ED INITIAL ASSESSMENT (HPI)
c/o couple weeks of uti symptoms, worsening in last couple days. Symptoms of low back pain, groin pain, foul smelling urine. Pt. With hx. Of prostate cancer,with removal 3 years ago.

## 2018-06-08 NOTE — ED PROVIDER NOTES
Patient Seen in: [facility]    History   Patient presents with:  Urinary Symptoms (urologic)    Stated Complaint: UTI    HPI  46-year-old gentleman presents to immediate care with the urinary frequency, dysuria, foul-smelling ur Comment: Tarsil fusion, 8 screws and 21 plate  No date: HAND/FINGER SURGERY UNLISTED      Comment: surgery of the thumb  1/2015: LAPAROSCOPY, SURGICAL PROSTATECTOMY, RETROPUBI*  1978 1989: LEG/ANKLE SURGERY PROC UNLISTED      Comment: first left then right and breath sounds normal.   Old surgical scar well-healed on the right side   Abdominal: Soft. Bowel sounds are normal. He exhibits no distension. There is no tenderness. There is no rebound.    Negative for CVA tenderness  Superficial inguinal nodes palpab within the right lobe of the liver inferiorly measuring 7 mm which is too small for characterization. BILIARY:  No visible dilatation or calcification. PANCREAS:  No lesion, fluid collection, ductal dilatation, or atrophy.   SPLEEN:  No enlargement or foc Prescribed:  Current Discharge Medication List

## 2018-06-11 ENCOUNTER — OFFICE VISIT (OUTPATIENT)
Dept: FAMILY MEDICINE CLINIC | Facility: CLINIC | Age: 63
End: 2018-06-11

## 2018-06-11 VITALS
DIASTOLIC BLOOD PRESSURE: 70 MMHG | BODY MASS INDEX: 34 KG/M2 | TEMPERATURE: 99 F | WEIGHT: 228 LBS | RESPIRATION RATE: 16 BRPM | HEART RATE: 60 BPM | SYSTOLIC BLOOD PRESSURE: 114 MMHG

## 2018-06-11 DIAGNOSIS — R10.31 RLQ ABDOMINAL PAIN: Primary | ICD-10-CM

## 2018-06-11 DIAGNOSIS — C61 PROSTATE CANCER (HCC): ICD-10-CM

## 2018-06-11 DIAGNOSIS — N39.3 SUI (STRESS URINARY INCONTINENCE), MALE: ICD-10-CM

## 2018-06-11 DIAGNOSIS — R31.29 MICROSCOPIC HEMATURIA: ICD-10-CM

## 2018-06-11 PROCEDURE — 99214 OFFICE O/P EST MOD 30 MIN: CPT | Performed by: FAMILY MEDICINE

## 2018-06-11 NOTE — PROGRESS NOTES
Patient presents with:  Urgent Care F/u: blood seen in urine but urine Cx was negative  Low Back Pain: x 2 week  Abdominal Pain: R sided x 2 weeks      Subjective   HPI:   This is a 65year old male coming in for pain 4 days ago, and still been intermitten Value Date/Time   WBC 3.8 (L) 03/09/2018 08:28 AM   HGB 16.0 03/09/2018 08:28 AM   HCT 47.3 03/09/2018 08:28 AM   .0 (L) 03/09/2018 08:28 AM           REVIEW OF SYSTEMS:   GENERAL HEALTH: feels well otherwise  Review of Systems   Constitutional: Neg is no hepatosplenomegaly. There is no tenderness. No hernia. Hernia confirmed negative in the right inguinal area and confirmed negative in the left inguinal area. Genitourinary: Penis normal. Right testis shows no mass and no tenderness.  Left testis annamaria if symptoms worsen or fail to improve.     Rebeka Kelley MD, 6/11/2018, 5:26 PM

## 2018-07-01 ENCOUNTER — APPOINTMENT (OUTPATIENT)
Dept: GENERAL RADIOLOGY | Age: 63
End: 2018-07-01
Attending: PHYSICIAN ASSISTANT
Payer: COMMERCIAL

## 2018-07-01 ENCOUNTER — HOSPITAL ENCOUNTER (OUTPATIENT)
Age: 63
Discharge: HOME | End: 2018-07-01
Payer: COMMERCIAL

## 2018-07-01 VITALS
HEIGHT: 70 IN | SYSTOLIC BLOOD PRESSURE: 135 MMHG | BODY MASS INDEX: 30.64 KG/M2 | OXYGEN SATURATION: 95 % | HEART RATE: 91 BPM | WEIGHT: 214 LBS | TEMPERATURE: 98 F | DIASTOLIC BLOOD PRESSURE: 93 MMHG | RESPIRATION RATE: 20 BRPM

## 2018-07-01 DIAGNOSIS — J20.9 ACUTE BRONCHITIS, UNSPECIFIED ORGANISM: Primary | ICD-10-CM

## 2018-07-01 PROCEDURE — 99214 OFFICE O/P EST MOD 30 MIN: CPT

## 2018-07-01 PROCEDURE — 99213 OFFICE O/P EST LOW 20 MIN: CPT

## 2018-07-01 PROCEDURE — 71046 X-RAY EXAM CHEST 2 VIEWS: CPT | Performed by: PHYSICIAN ASSISTANT

## 2018-07-01 RX ORDER — AZITHROMYCIN 250 MG/1
TABLET, FILM COATED ORAL
Qty: 1 PACKAGE | Refills: 0 | Status: SHIPPED | OUTPATIENT
Start: 2018-07-01 | End: 2018-07-06

## 2018-07-01 RX ORDER — ALBUTEROL SULFATE 90 UG/1
2 AEROSOL, METERED RESPIRATORY (INHALATION) EVERY 4 HOURS PRN
Qty: 1 INHALER | Refills: 0 | Status: SHIPPED | OUTPATIENT
Start: 2018-07-01 | End: 2018-07-31

## 2018-07-01 RX ORDER — PREDNISONE 20 MG/1
40 TABLET ORAL DAILY
Qty: 10 TABLET | Refills: 0 | Status: SHIPPED | OUTPATIENT
Start: 2018-07-01 | End: 2018-07-06

## 2018-07-01 NOTE — ED PROVIDER NOTES
Patient Seen in: [facility] In [facility]    History   Patient presents with:  Sinus Problem  Cough/URI    Stated Complaint: congestion, x5days     HPI    Calvin Morfin is a 73-year-old male who presents for evaluation of cough and congestion for 5 day ENDOSCOPY  12/5/16: COLONOSCOPY & POLYPECTOMY      Comment: small polyp at prior polypectomy site   No date: FEMUR/KNEE SURG UNLISTED      Comment: right knee  12/2012: FOOT/TOES SURGERY PROC UNLISTED      Comment: Tarsil fusion, 8 screws and 21 plate  No Nose: Mucosal edema present. Right sinus exhibits no maxillary sinus tenderness and no frontal sinus tenderness. Left sinus exhibits no maxillary sinus tenderness and no frontal sinus tenderness.    Mouth/Throat: Uvula is midline and mucous membranes are Additional verbal discharge instructions are given and discussed. Discharge medications are discussed. The patient is in good condition throughout the visit today and remains so upon discharge.   I discuss the plan of care with the patient, who expresses u

## 2018-07-01 NOTE — ED INITIAL ASSESSMENT (HPI)
C/o productive cough for 5 days, with sinus and chest congestion. Saturday morning with cold sweats. Just got back from [facility] last night.

## 2018-07-05 DIAGNOSIS — C61 PROSTATE CANCER (HCC): ICD-10-CM

## 2018-07-05 RX ORDER — TAMOXIFEN CITRATE 20 MG/1
TABLET ORAL
Qty: 30 TABLET | Refills: 10 | Status: SHIPPED | OUTPATIENT
Start: 2018-07-05 | End: 2019-06-09

## 2018-07-10 ENCOUNTER — OFFICE VISIT (OUTPATIENT)
Dept: SURGERY | Facility: CLINIC | Age: 63
End: 2018-07-10

## 2018-07-10 VITALS
BODY MASS INDEX: 30.64 KG/M2 | SYSTOLIC BLOOD PRESSURE: 132 MMHG | HEART RATE: 76 BPM | HEIGHT: 70 IN | DIASTOLIC BLOOD PRESSURE: 76 MMHG | OXYGEN SATURATION: 97 % | WEIGHT: 214 LBS | RESPIRATION RATE: 20 BRPM

## 2018-07-10 DIAGNOSIS — C50.121 MALIGNANT NEOPLASM OF CENTRAL PORTION OF RIGHT BREAST IN MALE, ESTROGEN RECEPTOR POSITIVE (HCC): Primary | ICD-10-CM

## 2018-07-10 DIAGNOSIS — Z85.3 HISTORY OF BREAST CANCER IN MALE: ICD-10-CM

## 2018-07-10 DIAGNOSIS — Z17.0 MALIGNANT NEOPLASM OF CENTRAL PORTION OF RIGHT BREAST IN MALE, ESTROGEN RECEPTOR POSITIVE (HCC): Primary | ICD-10-CM

## 2018-07-10 DIAGNOSIS — Z12.39 BREAST CANCER SCREENING, HIGH RISK PATIENT: ICD-10-CM

## 2018-07-10 PROCEDURE — 99214 OFFICE O/P EST MOD 30 MIN: CPT | Performed by: SURGERY

## 2018-07-10 NOTE — PROGRESS NOTES
Breast Surgery Follow-Up Visit    Diagnosis: Infiltrating Ductal Carcinoma, right breast; ER positive, [place] positive status post modified radical mastectomy without reconstruction on December 15, 2015.     Stage: [id number] (Stage IB)    Disease Status:  Denise Mendoza cell carcinoma 12/12/2012    right shoulder; left forearm   • Basal cell carcinoma 06/19/2002    right forehead   • Calculus of kidney    • Hyperlipidemia    • Personal history of arthritis    • Prostate cancer [facility]) 1/2015    [address] [address] 12.5 MG Oral Tab CR Take 1 tablet (12.5 mg total) by mouth nightly as needed for Sleep. Disp: 30 tablet Rfl: 5   ATORVASTATIN 40 MG Oral Tab TAKE 1 TABLET (40 MG TOTAL) BY MOUTH DAILY. Disp: 30 tablet Rfl: 10   VIAGRA 100 MG Oral Tab as needed.    Disp:  Rf nose bleeds, snoring, pain in mouth/throat, hoarseness, change in voice, facial trauma.     Respiratory:  The patient denies chronic cough, phlegm, hemoptysis, pleurisy/chest pain, pneumonia, asthma, wheezing, difficulty in breathing with exertion, emphysem history of poor/slow wound healing, weight loss/gain, fertility or hormone problems, cold intolerance, thyroid disease. Allergic/Immunologic:  There is no history of hives, hay fever, angioedema or anaphylaxis.     /76 (BP Location: Left arm, Angélica a total of 25 minutes, more than 50% of which was dedicated to the discussion of management options.

## 2018-07-30 ENCOUNTER — TELEPHONE (OUTPATIENT)
Dept: FAMILY MEDICINE CLINIC | Facility: CLINIC | Age: 63
End: 2018-07-30

## 2018-07-30 NOTE — TELEPHONE ENCOUNTER
Pt called because his assistant went to the ER and was dx with spinal meningitis this weekend. Pt would like to speak with a nurse regarding any necessary steps he should take or symptoms he should moniter or be concerned about.  Please call patient at [phone]

## 2018-07-30 NOTE — TELEPHONE ENCOUNTER
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  Pt himself has a weakened Immune System due Breast and Prostate Cancer with lymph nodes

## 2018-07-30 NOTE — TELEPHONE ENCOUNTER
Many types of meningitis, only specific types covered in vaccine need vaccination. Since we do not care for assistant, we cannot get details, but [facility] department can answer questions about his risk.  I do not have answers, but [facility] handles the

## 2018-08-06 DIAGNOSIS — E78.2 MIXED HYPERLIPIDEMIA: ICD-10-CM

## 2018-08-06 RX ORDER — ATORVASTATIN CALCIUM 40 MG/1
TABLET, FILM COATED ORAL
Qty: 30 TABLET | Refills: 5 | Status: SHIPPED | OUTPATIENT
Start: 2018-08-06 | End: 2019-04-04

## 2018-08-06 NOTE — TELEPHONE ENCOUNTER
Received refill request from [place] for Atorvastatin 40mg. daily. This medication passed protocol. Agustina Gonzalez has an upcoming appointment with Christiano Garcia for annual physical 9/8/18. Given #30 with 5 additional refills.         Future Appointments  Date Time Provider

## 2018-08-08 ENCOUNTER — OFFICE VISIT (OUTPATIENT)
Dept: HEMATOLOGY/ONCOLOGY | Facility: HOSPITAL | Age: 63
End: 2018-08-08
Attending: INTERNAL MEDICINE
Payer: COMMERCIAL

## 2018-08-08 ENCOUNTER — TELEPHONE (OUTPATIENT)
Dept: FAMILY MEDICINE CLINIC | Facility: CLINIC | Age: 63
End: 2018-08-08

## 2018-08-08 VITALS
WEIGHT: 224 LBS | TEMPERATURE: 98 F | SYSTOLIC BLOOD PRESSURE: 149 MMHG | OXYGEN SATURATION: 98 % | RESPIRATION RATE: 18 BRPM | DIASTOLIC BLOOD PRESSURE: 87 MMHG | HEART RATE: 79 BPM | BODY MASS INDEX: 32.07 KG/M2 | HEIGHT: 70 IN

## 2018-08-08 DIAGNOSIS — G47.33 OSA ON CPAP: Primary | ICD-10-CM

## 2018-08-08 DIAGNOSIS — Z99.89 OSA ON CPAP: Primary | ICD-10-CM

## 2018-08-08 DIAGNOSIS — Z17.0 MALIGNANT NEOPLASM OF CENTRAL PORTION OF RIGHT BREAST IN MALE, ESTROGEN RECEPTOR POSITIVE (HCC): Primary | ICD-10-CM

## 2018-08-08 DIAGNOSIS — C50.121 MALIGNANT NEOPLASM OF CENTRAL PORTION OF RIGHT BREAST IN MALE, ESTROGEN RECEPTOR POSITIVE (HCC): Primary | ICD-10-CM

## 2018-08-08 PROCEDURE — 99213 OFFICE O/P EST LOW 20 MIN: CPT | Performed by: INTERNAL MEDICINE

## 2018-08-08 NOTE — PROGRESS NOTES
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 Disp:  Rfl:    Zolpidem Tartrate ER (AMBIEN CR) 12.5 MG Oral Tab CR Take 1 tablet (12.5 mg total) by mouth nightly as needed for Sleep. with SHORTY. He will continue with Tamoxifen 20 mg daily. I will continue to follow him at six month intervals. He will call with any new complaints prior to the next visit. He is getting a diagnostic mammogram of the left. We will get this yearly.     Pros

## 2018-08-08 NOTE — TELEPHONE ENCOUNTER
Referral request DME CPAP supplies from [address] [place] [address] Drive  [address] [facility], [address] [place] [address]  Phone number [phone]  Fax number [phone]    \[facility]\""   AirFit P10Nsl Pillow Mask/Pillow Medium  Art Orris

## 2018-08-31 ENCOUNTER — HOSPITAL ENCOUNTER (OUTPATIENT)
Age: 63
Discharge: HOME | End: 2018-08-31
Attending: EMERGENCY MEDICINE
Payer: COMMERCIAL

## 2018-08-31 ENCOUNTER — TELEPHONE (OUTPATIENT)
Dept: FAMILY MEDICINE CLINIC | Facility: CLINIC | Age: 63
End: 2018-08-31

## 2018-08-31 VITALS
RESPIRATION RATE: 18 BRPM | HEART RATE: 88 BPM | TEMPERATURE: 98 F | SYSTOLIC BLOOD PRESSURE: 140 MMHG | DIASTOLIC BLOOD PRESSURE: 93 MMHG | OXYGEN SATURATION: 96 %

## 2018-08-31 DIAGNOSIS — N30.01 ACUTE CYSTITIS WITH HEMATURIA: Primary | ICD-10-CM

## 2018-08-31 LAB
POCT BILIRUBIN URINE: NEGATIVE
POCT GLUCOSE URINE: NEGATIVE MG/DL
POCT KETONE URINE: NEGATIVE MG/DL
POCT NITRITE URINE: NEGATIVE
POCT PH URINE: 7 (ref 5–8)
POCT PROTEIN URINE: NEGATIVE MG/DL
POCT SPECIFIC GRAVITY URINE: 1.01
POCT URINE CLARITY: CLEAR
POCT URINE COLOR: YELLOW
POCT UROBILINOGEN URINE: 0.2 MG/DL

## 2018-08-31 PROCEDURE — 87086 URINE CULTURE/COLONY COUNT: CPT | Performed by: EMERGENCY MEDICINE

## 2018-08-31 PROCEDURE — 81002 URINALYSIS NONAUTO W/O SCOPE: CPT | Performed by: EMERGENCY MEDICINE

## 2018-08-31 PROCEDURE — 99214 OFFICE O/P EST MOD 30 MIN: CPT

## 2018-08-31 PROCEDURE — 87088 URINE BACTERIA CULTURE: CPT | Performed by: EMERGENCY MEDICINE

## 2018-08-31 PROCEDURE — 87186 SC STD MICRODIL/AGAR DIL: CPT | Performed by: EMERGENCY MEDICINE

## 2018-08-31 RX ORDER — SULFAMETHOXAZOLE AND TRIMETHOPRIM 800; 160 MG/1; MG/1
1 TABLET ORAL 2 TIMES DAILY
Qty: 28 TABLET | Refills: 0 | Status: SHIPPED | OUTPATIENT
Start: 2018-08-31 | End: 2018-09-14

## 2018-08-31 NOTE — ED PROVIDER NOTES
Patient Seen in: [facility]    History   Patient presents with:  Urinary Symptoms (urologic)    Stated Complaint: urinary issues 2 days    HPI    The patient is a 61-year-old male with a history of recurrent urinary tract infect COLONOSCOPY;  Surgeon: Vinny Galeas MD;  Location: [address] [place] [address] ENDOSCOPY  12/5/16: COLONOSCOPY & POLYPECTOMY      Comment: small polyp at prior polypectomy site   No date: FEMUR/KNEE SURG UNLISTED      Comment: right knee  12/2012: FOOT/TOES SURG symmetric motor strength and sensation proximally and distally throughout all 4 extremities. HEENT: No lymphadenopathy, TMs nml. Oropharynx nml. Mucus membranes moist.   Supple neck without any meningismus or rigidity.    Cardiovascular: Regular rhythm wi is to return if he develops any worrisome or worsening symptoms. He verbalized understanding of red flags that should prompt return to the ER. Otherwise he will follow-up with his urologist next week.   He feels comfortable with this plan is discharged ho

## 2018-09-13 DIAGNOSIS — F51.01 PRIMARY INSOMNIA: ICD-10-CM

## 2018-09-13 RX ORDER — ZOLPIDEM TARTRATE 12.5 MG/1
TABLET, FILM COATED, EXTENDED RELEASE ORAL
Qty: 30 TABLET | Refills: 0 | OUTPATIENT
Start: 2018-09-13 | End: 2018-09-28

## 2018-09-13 NOTE — TELEPHONE ENCOUNTER
LOV 6/11/2018     Future Appointments   Date Time Provider Magdaleno Angelic   9/20/2018  7:45 AM SONAL NURSE [facility] DERM [facility]   9/28/2018  7:45 AM Radha Flores MD EMG 3 EMG [facility]   2/6/2019  8:00 AM Tressa Steele MD [address] [facility] HEM ONC Javier Varela

## 2018-09-28 ENCOUNTER — OFFICE VISIT (OUTPATIENT)
Dept: FAMILY MEDICINE CLINIC | Facility: CLINIC | Age: 63
End: 2018-09-28
Payer: COMMERCIAL

## 2018-09-28 VITALS
HEART RATE: 76 BPM | TEMPERATURE: 98 F | BODY MASS INDEX: 31.1 KG/M2 | RESPIRATION RATE: 14 BRPM | SYSTOLIC BLOOD PRESSURE: 110 MMHG | DIASTOLIC BLOOD PRESSURE: 76 MMHG | WEIGHT: 212.38 LBS | HEIGHT: 69.4 IN

## 2018-09-28 DIAGNOSIS — F51.01 PRIMARY INSOMNIA: ICD-10-CM

## 2018-09-28 DIAGNOSIS — Z12.11 SCREEN FOR COLON CANCER: ICD-10-CM

## 2018-09-28 DIAGNOSIS — N20.0 NEPHROLITHIASIS: ICD-10-CM

## 2018-09-28 DIAGNOSIS — Z23 NEED FOR VACCINATION: ICD-10-CM

## 2018-09-28 DIAGNOSIS — C61 PROSTATE CANCER (HCC): ICD-10-CM

## 2018-09-28 DIAGNOSIS — E78.2 MIXED HYPERLIPIDEMIA: ICD-10-CM

## 2018-09-28 DIAGNOSIS — N39.0 RECURRENT UTI: ICD-10-CM

## 2018-09-28 DIAGNOSIS — Z00.00 ANNUAL PHYSICAL EXAM: Primary | ICD-10-CM

## 2018-09-28 PROBLEM — L40.9 PSORIASIS: Status: ACTIVE | Noted: 2018-09-28

## 2018-09-28 PROCEDURE — 99396 PREV VISIT EST AGE 40-64: CPT | Performed by: FAMILY MEDICINE

## 2018-09-28 PROCEDURE — 90471 IMMUNIZATION ADMIN: CPT | Performed by: FAMILY MEDICINE

## 2018-09-28 PROCEDURE — 90670 PCV13 VACCINE IM: CPT | Performed by: FAMILY MEDICINE

## 2018-09-28 RX ORDER — OLOPATADINE HYDROCHLORIDE 1 MG/ML
1 SOLUTION/ DROPS OPHTHALMIC 2 TIMES DAILY
Qty: 5 ML | Refills: 3 | Status: SHIPPED | OUTPATIENT
Start: 2018-09-28 | End: 2018-12-10

## 2018-09-28 RX ORDER — ZOLPIDEM TARTRATE 12.5 MG/1
TABLET, FILM COATED, EXTENDED RELEASE ORAL
Qty: 30 TABLET | Refills: 5 | Status: SHIPPED | COMMUNITY
Start: 2018-09-28 | End: 2019-04-04

## 2018-09-28 NOTE — PROGRESS NOTES
[facility] VISIT         Concepción is now approximately  2 weeks out from    Right total knee arthroplasty.  Doing well.  Complaints of,  none.    Taking Oxycodone for pain.    Ambulating with walker.   Pain medication refill requested, Yes      Denies SOB, drainage from wound, calf pain or tenderness.       I have reviewed the past medical history, family history, social history, medications and allergies listed in the medical record as obtained by my nursing staff and support staff and agree with their documentation.       Vitals:    08/30/17 1131   BP: 112/62   Pulse: 98          Wound to be healing well.  There is expected warmth, swelling, ecchymosis. There is not drainage. Gait without antalgia using  walker for gait assist.    Knee Range of motion is 5 to 90.   Distally there is no  swelling in the calf with a negative Omer's sign.  Distally the patient is NV intact.     AP/Lat of Right  knee demonstrate good overall alignment and placement.         ASSESSMENT:  Post Op Right total knee arthroplasty.    2 weeks,  good condition.       PLAN:         Restrictions:     Do not submerge incision under water.       Instructions:   Continue Physical Therapy.  Work on Range of Motion exercises.  Continue walker.  Advance to cane.  Increase activity as tolerated.  Keep wound clean and dry.  May leave open to air.  Continue coumadin for 2 weeks.  Continue compression stockings until done with coumadin.  Continue compression stockings for swelling.  Need antibiotics before any dental work or GI surgery for life.  OK to shower.  Decrease pain medication use as tolerated.  It is not yet OK to drive.         Per [place] law, The [place] Prescription Drug Monitoring website was visited, via JOHNNY, to ensure compliance with [facility] statue.    The system was functioning.    Aberrant behavior was not  Identified.    Prescription for oxycodone was provided.    Patient instructed not share with or take  Jeffery Jean is a 65year old male who presents for a complete physical exam.   HPI:   Patient presents with:  Physical  Referral: to GI DR Veronica Moreland for Colonoscopy and Nephrology for Frequent UTI- Urology recommended further workup with Kidney doc  Jhonny Polanco HDL 39 (L) 03/09/2018 08:28 AM    TRIG 109 03/09/2018 08:28 AM    LDL 35 03/09/2018 08:28 AM    NONHDLC 57 03/09/2018 08:28 AM       Lab Results   Component Value Date/Time    A1C 5.4 09/27/2017    VITD 29.1 (L) 03/09/2018 08:28 AM    PSA 5.530 (H) 04/23/2 other persons prescription medications.               Recheck in 1 month, earlier on a PRN basis.  All the patients questions were answered.   X-ray will not be required at the next visit unless the patient is having problems.     Lei Barnes PA-C  [facility]  [phone]    Supervising Physician for this date and note.   Osmin Perez MD     Cancer (age of onset: 48) in his sister; Breast Cancer (age of onset: 62) in his self; Breast Cancer (age of onset: 76) in his mother; Cancer in his maternal uncle;  Cancer (age of onset: 46) in his sister; Cancer (age of onset: 63) in his self; Diabetes in oropharyngeal exudate. Eyes: Conjunctivae and EOM are normal. Pupils are equal, round, and reactive to light. Neck: Trachea normal and normal range of motion. Neck supple. Normal carotid pulses present. No edema present.  No thyroid mass and no thyromeg TDAP 02/07/2012      Pneumococcal PPSV23/PCV13 Highest Risk Adult(1 of 3 - PCV13) due on 10/05/1975  Annual Physical due on 09/22/2018  PSA due on 09/27/2018  Colonoscopy due on 12/05/2018  Annual Depression Screen due on 08/08/2019  Influenza Vaccine Comp

## 2018-10-31 ENCOUNTER — OFFICE VISIT (OUTPATIENT)
Dept: NEPHROLOGY | Facility: CLINIC | Age: 63
End: 2018-10-31
Payer: COMMERCIAL

## 2018-10-31 VITALS — BODY MASS INDEX: 32 KG/M2 | DIASTOLIC BLOOD PRESSURE: 86 MMHG | SYSTOLIC BLOOD PRESSURE: 158 MMHG | WEIGHT: 224 LBS

## 2018-10-31 DIAGNOSIS — R31.29 MICROSCOPIC HEMATURIA: ICD-10-CM

## 2018-10-31 DIAGNOSIS — N39.0 RECURRENT UTI: ICD-10-CM

## 2018-10-31 DIAGNOSIS — N18.2 CKD (CHRONIC KIDNEY DISEASE) STAGE 2, GFR 60-89 ML/MIN: Primary | ICD-10-CM

## 2018-10-31 PROCEDURE — 99244 OFF/OP CNSLTJ NEW/EST MOD 40: CPT | Performed by: INTERNAL MEDICINE

## 2018-11-01 NOTE — PROGRESS NOTES
Nephrology Consult Note    REASON FOR CONSULT: Hematuria / recurrent UTI / stones    ASSESSMENT/PLAN:        1) Microscopic hematuria- due to recent UTI; 4 urinalyses obtained between [facility] and at the [facility] showing microscopic hem develop breast cancer 2 years ago underwent mastectomy and is currently on tamoxifen.     ROS:    Denies fever/chills  Denies wt loss/gain  Denies HA or visual changes  Denies CP or palpitations  Denies SOB/cough/hemoptysis  Denies abd or flank pain  Denies LAPAROSCOPY, SURGICAL PROSTATECTOMY, RETROPUBIC RADICAL, W/NERVE SPARING  1/2015   • LEG/ANKLE SURGERY [address]    first left then right   • MASTECTOMY RIGHT      SENTINEL AND AXILLARY LYMPH NODE REMOVAL   • OTHER SURGICAL HISTORY  1/2015 Never Smoker      Smokeless tobacco: Never Used    Substance and Sexual Activity      Alcohol use:  Yes        Alcohol/week: 1.2 - 1.8 oz        Types: 2 - 3 Standard drinks or equivalent per week        Comment: 2-3 drinks a week      Drug use: No      Sex rhythm, S1, S2 normal, no murmur or rub  Lungs: Clear without wheezes, rales, rhonchi. Abdomen: Soft, non-tender. + bowel sounds, no palpable organomegaly  Extremities: Without clubbing, cyanosis or edema.   Neurologic:  normal affect, cranial nerves lena

## 2018-12-05 ENCOUNTER — APPOINTMENT (OUTPATIENT)
Dept: CT IMAGING | Age: 63
End: 2018-12-05
Attending: PHYSICIAN ASSISTANT
Payer: COMMERCIAL

## 2018-12-05 ENCOUNTER — HOSPITAL ENCOUNTER (OUTPATIENT)
Age: 63
Discharge: HOME | End: 2018-12-05
Payer: COMMERCIAL

## 2018-12-05 VITALS
WEIGHT: 215 LBS | HEIGHT: 70 IN | RESPIRATION RATE: 18 BRPM | DIASTOLIC BLOOD PRESSURE: 97 MMHG | TEMPERATURE: 98 F | BODY MASS INDEX: 30.78 KG/M2 | HEART RATE: 67 BPM | OXYGEN SATURATION: 94 % | SYSTOLIC BLOOD PRESSURE: 146 MMHG

## 2018-12-05 DIAGNOSIS — R31.9 HEMATURIA, UNSPECIFIED TYPE: ICD-10-CM

## 2018-12-05 DIAGNOSIS — R10.9 LEFT FLANK PAIN: Primary | ICD-10-CM

## 2018-12-05 DIAGNOSIS — N20.0 RENAL CALCULI: ICD-10-CM

## 2018-12-05 PROCEDURE — 99214 OFFICE O/P EST MOD 30 MIN: CPT

## 2018-12-05 PROCEDURE — 87086 URINE CULTURE/COLONY COUNT: CPT | Performed by: PHYSICIAN ASSISTANT

## 2018-12-05 PROCEDURE — 80047 BASIC METABLC PNL IONIZED CA: CPT

## 2018-12-05 PROCEDURE — 74176 CT ABD & PELVIS W/O CONTRAST: CPT | Performed by: PHYSICIAN ASSISTANT

## 2018-12-05 PROCEDURE — 81002 URINALYSIS NONAUTO W/O SCOPE: CPT | Performed by: PHYSICIAN ASSISTANT

## 2018-12-05 PROCEDURE — 85025 COMPLETE CBC W/AUTO DIFF WBC: CPT | Performed by: PHYSICIAN ASSISTANT

## 2018-12-05 PROCEDURE — 96374 THER/PROPH/DIAG INJ IV PUSH: CPT

## 2018-12-05 RX ORDER — KETOROLAC TROMETHAMINE 30 MG/ML
30 INJECTION, SOLUTION INTRAMUSCULAR; INTRAVENOUS ONCE
Status: COMPLETED | OUTPATIENT
Start: 2018-12-05 | End: 2018-12-05

## 2018-12-05 RX ORDER — SULFAMETHOXAZOLE AND TRIMETHOPRIM 800; 160 MG/1; MG/1
1 TABLET ORAL 2 TIMES DAILY
Qty: 20 TABLET | Refills: 0 | Status: SHIPPED | OUTPATIENT
Start: 2018-12-05 | End: 2018-12-10

## 2018-12-06 ENCOUNTER — TELEPHONE (OUTPATIENT)
Dept: URGENT CARE | Age: 63
End: 2018-12-06

## 2018-12-06 NOTE — ED NOTES
Called to patient and  verified for identification. Made aware of CBC with auto diff results platelets 616 and normal is 150. Instructed to follow-up with PCP for repeat CBC in few weeks. Verbalized understanding. No further questions when asked.

## 2018-12-06 NOTE — ED PROVIDER NOTES
Patient Seen in: [facility]    History   Patient presents with:  Back Pain (musculoskeletal)    Stated Complaint: back pain x 6 days    HPI    59-year-old male here with complaint of left flank pain for the past 6 days.   Patient 12/5/2016    Performed by Sandor Duncan MD at [facility] ENDOSCOPY   • COLONOSCOPY & POLYPECTOMY  12/5/16    small polyp at prior polypectomy site    • FEMUR/KNEE SURG UNLISTED      right knee   • FOOT/TOES SURGERY PROC UNLISTED  12/2012    Tarsil fusion, 8 scr is oriented to person, place, and time. He appears well-developed and well-nourished.    HENT:   Right Ear: External ear normal.   Left Ear: External ear normal.   Nose: Nose normal.   Mouth/Throat: Oropharynx is clear and moist.   Eyes: Conjunctivae and EO which includes the Dose Index Registry. PATIENT STATED HISTORY: (As transcribed by Technologist)  Patient states to have left lower quadrant and left flank pain for 1 week. Patient has diarrhea and microscopic hematuria.  Patient has a history of kidney st urologist.      The patient is in good condition thru out treatment today and remains so upon discharge. I discussed the plan of care with the patient, who expresses understanding.  All questions and concerns are addressed to the patients iván tuttle

## 2018-12-08 ENCOUNTER — HOSPITAL ENCOUNTER (EMERGENCY)
Facility: HOSPITAL | Age: 63
Discharge: HOME | End: 2018-12-09
Attending: EMERGENCY MEDICINE
Payer: COMMERCIAL

## 2018-12-08 DIAGNOSIS — M54.9 MODERATE BACK PAIN: Primary | ICD-10-CM

## 2018-12-08 PROCEDURE — 80053 COMPREHEN METABOLIC PANEL: CPT | Performed by: EMERGENCY MEDICINE

## 2018-12-08 PROCEDURE — 99284 EMERGENCY DEPT VISIT MOD MDM: CPT

## 2018-12-08 PROCEDURE — 96374 THER/PROPH/DIAG INJ IV PUSH: CPT

## 2018-12-08 PROCEDURE — 81003 URINALYSIS AUTO W/O SCOPE: CPT | Performed by: EMERGENCY MEDICINE

## 2018-12-08 PROCEDURE — 85025 COMPLETE CBC W/AUTO DIFF WBC: CPT | Performed by: EMERGENCY MEDICINE

## 2018-12-08 PROCEDURE — 96361 HYDRATE IV INFUSION ADD-ON: CPT

## 2018-12-08 PROCEDURE — 96375 TX/PRO/DX INJ NEW DRUG ADDON: CPT

## 2018-12-08 RX ORDER — KETOROLAC TROMETHAMINE 30 MG/ML
30 INJECTION, SOLUTION INTRAMUSCULAR; INTRAVENOUS ONCE
Status: COMPLETED | OUTPATIENT
Start: 2018-12-08 | End: 2018-12-08

## 2018-12-08 RX ORDER — MORPHINE SULFATE 4 MG/ML
4 INJECTION, SOLUTION INTRAMUSCULAR; INTRAVENOUS ONCE
Status: COMPLETED | OUTPATIENT
Start: 2018-12-08 | End: 2018-12-08

## 2018-12-09 ENCOUNTER — APPOINTMENT (OUTPATIENT)
Dept: CT IMAGING | Facility: HOSPITAL | Age: 63
End: 2018-12-09
Attending: EMERGENCY MEDICINE
Payer: COMMERCIAL

## 2018-12-09 VITALS
WEIGHT: 211 LBS | HEART RATE: 72 BPM | TEMPERATURE: 98 F | BODY MASS INDEX: 30.21 KG/M2 | RESPIRATION RATE: 18 BRPM | OXYGEN SATURATION: 99 % | DIASTOLIC BLOOD PRESSURE: 88 MMHG | HEIGHT: 70 IN | SYSTOLIC BLOOD PRESSURE: 134 MMHG

## 2018-12-09 PROCEDURE — 74176 CT ABD & PELVIS W/O CONTRAST: CPT | Performed by: EMERGENCY MEDICINE

## 2018-12-09 RX ORDER — CYCLOBENZAPRINE HCL 10 MG
10 TABLET ORAL 3 TIMES DAILY PRN
Qty: 20 TABLET | Refills: 0 | Status: SHIPPED | OUTPATIENT
Start: 2018-12-09 | End: 2018-12-16

## 2018-12-09 NOTE — ED PROVIDER NOTES
Patient Seen in: [facility] Emergency Department    History   Patient presents with:  Back Pain (musculoskeletal)    Stated Complaint: lower back pain, blood in urine, urine cx neg    HPI    59-year-old male presents emergency department has been ha Performed by Van Eagle MD at [facility] ENDOSCOPY   • COLONOSCOPY & POLYPECTOMY  12/5/16    small polyp at prior polypectomy site    • FEMUR/KNEE SURG UNLISTED      right knee   • FOOT/TOES SURGERY PROC UNLISTED  12/2012    Tarsil fusion, 8 screws and 21 pl pharynx  Neck: Supple no JVD no lymphadenopathy no meningismus no carotid bruit  CV: Regular rate and rhythm no murmur rub  Respiratory: Clear to auscultation bilaterally no crackles no wheezes no accessory muscle use  Abdomen: Soft nontender nondistended, primary. I do recommend he does get an outpatient MRI      MDM   Patient was made aware of medical condition and instructed to take medications as prescribed. Patient is aware that they are to return to ED if any worsening problems.   Patient was also ins

## 2018-12-09 NOTE — ED INITIAL ASSESSMENT (HPI)
L lower back pain X 9 days Message     Recorded as Task   Date: 08/03/2016 09:52 AM, Created By: Stefania Jacobo   Task Name: Call Back   Assigned To: [facility] susannah triage,Team   Regarding Patient: Didi Lau, Status: In Progress   Comment:    ShonebergerSmiley - 03 Aug 2016 9:52 AM     TASK CREATED  Caller: Mark Dumont, Mother; Results Inquiry; [phone]  [facility] pt  throat culture results   Horn,April - 03 Aug 2016 9:58 AM     TASK IN PROGRESS   Horn,April - 03 Aug 2016 10:01 AM     TASK EDITED  Throat culture is negative  Temp  fluctuating at 100 0 and decreases  PROTOCOL: : Fever- Pediatric Guideline     DISPOSITION:  Home Care - Fever with no signs of serious infection and no localizing symptoms     CARE ADVICE:       1 REASSURANCE AND EDUCATION: * Presence of a fever means your child has an infection, usually caused by a virus  Most fevers are good for sick children and help the body fight infection  2 TREATMENT FOR ALL FEVERS - EXTRA FLUIDS AND LESS CLOTHING:* Give cold fluids orally in unlimited amounts (reason: good hydration replaces sweat and improves heat loss via skin)  * Dress in 1 layer of light weight clothing and sleep with 1 light blanket (avoid bundling)  (Caution: overheated infants can`t undress themselves )* For fevers 100-102 F (37 8 - 39C), fever medicine is rarely needed  Fevers of this level don`t cause discomfort, but they do help the body fight the infection  3 FEVER MEDICINE:* Fevers only need to be treated with medicine if they cause discomfort  That usually means fevers over 102 F (39 C) or 103 F (39 4 C)  * Give acetaminophen (e g , Tylenol) or ibuprofen (e g , Advil)  See the dosage charts  * Exception: For infants less than 12 weeks, avoid giving acetaminophen before being seen  (Reason: need accurate documentation of fever before initiating septic work-up)* The goal of fever therapy is to bring the temperature down to a comfortable level   Remember, the fever medicine usually lowers the fever by 2 to 3 F (1 - 1 5 C)  * Avoid aspirin (Reason: risk of Reye syndrome, a rare but serious brain disease )* Avoid Alternating Acetaminophen and Ibuprofen: (Reason: unnecessary and risk of overdosage)  Instead, give reassurance for fever phobia or switch entirely to ibuprofen  If caller brings up this topic, state `we do not recommend this practice`  4 SPONGING WITH LUKEWARM WATER: * Note: Sponging is optional for high fevers, not required  * Indication: May sponge for (1) fever above 104 F (40 C) AND (2) doesn`t come down with acetaminophen (e g , Tylenol) or ibuprofen (always give fever medicine first) AND (3) causes discomfort  * How to sponge: Use lukewarm water (85 - 90 F) (29 4 - 32 2 C)  Do not use rubbing alcohol  Sponge for 20-30 minutes  * If your child shivers or becomes cold, stop sponging or increase the water temperature  * Caution: Do not use rubbing alcohol (Reason: exposure can cause confusion or coma)   5  WARM CLOTHES FOR SHIVERING:* Shivering means your child`s temperature is trying to go up  * It will continue until the fever medicine takes effect  * Dress your child in warm clothes or wrap him in a blanket until he stops shivering  * Once the shivering stops, remove the blanket or excess clothing  7  EXPECTED COURSE OF FEVER: * Most fevers associated with viral illnesses fluctuate between 101 and 104 F (38 4 and 40 C) and last for 2 or 3 days  8 CALL BACK IF:* Your child looks or acts very sick* Any serious symptoms occur* Any fever occurs if under 15weeks old* Fever without other symptoms lasts over 24 hours (if age less than 2 years)* Fever lasts over 3 days (72 hours)* Fever goes above 105 F (40 6 C) (add that this is rare)* Your child becomes worse        Active Problems   1  Allergy to peanuts (V15 01) (Z91 010)  2  Asthma (493 90) (J45 909)  3  History of allergy (V15 09) (Z88 9)  4  Pharyngitis (462) (J02 9)    Current Meds  1   Cetirizine HCl Allergy Child 5 MG/5ML Oral Solution; take 3/4 tsp 2x/day; Therapy: [id number] to (Last HC:[id number]) Ordered  2  Child Ibuprofen 100 MG/5ML SUSP; Therapy: (Lizet Muller) to Recorded  3  Childrens Multivitamin CHEW;   Therapy: (Recorded:12Sep2014) to Recorded  4  EpiPen Jr 2-Jayy 0 15 MG/0 3ML Injection Solution Auto-injector; INJECT 0 15MG   INTRAMUSCULARLY AS NEEDED; Therapy: [id number] to (Last Gold Quinn)  Requested for: 25Sep2015 Ordered  5  Fluticasone Propionate 50 MCG/ACT Nasal Suspension; Therapy: (Recorded:12Sep2014) to Recorded  6  Singulair 4 MG Oral Tablet Chewable (Montelukast Sodium); Therapy: (Lizet Muller) to Recorded  7  Symbicort 160-4 5 MCG/ACT Inhalation Aerosol; INHALE 2 PUFFS TWICE DAILY  RINSE MOUTH AFTER USE; Therapy: [id number] to (Last AMAYA:[id number]) Ordered  8  Vitamin D 1000 UNIT CAPS; TAKE 2000 UNIT Daily; Therapy: (Recorded:09Mar2015) to Recorded    Allergies   1  Shellfish-derived Products   2  Peanuts  3  Seasonal  4   Shellfish    Signatures   Electronically signed by : Axel Pratt, ; Aug  3 2016 10:01AM EST                       (Author)    Electronically signed by : THU Mendoza ; Aug  3 2016 10:10AM EST                       (Author)

## 2018-12-10 ENCOUNTER — OFFICE VISIT (OUTPATIENT)
Dept: FAMILY MEDICINE CLINIC | Facility: CLINIC | Age: 63
End: 2018-12-10
Payer: COMMERCIAL

## 2018-12-10 VITALS
RESPIRATION RATE: 18 BRPM | WEIGHT: 222.81 LBS | BODY MASS INDEX: 31.9 KG/M2 | HEART RATE: 84 BPM | DIASTOLIC BLOOD PRESSURE: 76 MMHG | HEIGHT: 70 IN | TEMPERATURE: 98 F | SYSTOLIC BLOOD PRESSURE: 130 MMHG

## 2018-12-10 DIAGNOSIS — M54.50 ACUTE LEFT-SIDED LOW BACK PAIN WITHOUT SCIATICA: Primary | ICD-10-CM

## 2018-12-10 PROCEDURE — 99213 OFFICE O/P EST LOW 20 MIN: CPT | Performed by: PHYSICIAN ASSISTANT

## 2018-12-10 RX ORDER — METHYLPREDNISOLONE 4 MG/1
TABLET ORAL
Qty: 1 KIT | Refills: 0 | Status: SHIPPED | OUTPATIENT
Start: 2018-12-10 | End: 2019-02-06 | Stop reason: ALTCHOICE

## 2018-12-10 NOTE — PROGRESS NOTES
Subjective     CHIEF COMPLAINT / REASON FOR VISIT  Back pain    HISTORY OF PRESENT ILLNESS  Milena De Souza \"Gumaro\" is a 59year old male who presents for evaluation of Er F/u (Pt was at [address] for left back pain. Pain started insidiously 11 days ago.  He d directed per physician's instructions. , Disp: 1 Bottle, Rfl: 0  •  ATORVASTATIN 40 MG Oral Tab, TAKE ONE TABLET BY MOUTH ONE TIME DAILY , Disp: 30 tablet, Rfl: 5    Allergies: Levaquin [Levofloxacin]    Patient Active Problem List:     Prostate cancer ([facility] [address] Pedal pulses 2+ bilaterally. No peripheral edema noted. ABDOMINAL EXAM:  Soft, nontender, nondistended. No enlarged aorta, hepatosplenomegaly or hernias. MUSCULOSKELETAL:  Spine: no lesions on inspection with normal range of motion.   Tender along left

## 2018-12-15 PROCEDURE — 88305 TISSUE EXAM BY PATHOLOGIST: CPT | Performed by: INTERNAL MEDICINE

## 2018-12-19 ENCOUNTER — TELEPHONE (OUTPATIENT)
Dept: FAMILY MEDICINE CLINIC | Facility: CLINIC | Age: 63
End: 2018-12-19

## 2018-12-19 DIAGNOSIS — M54.50 LOW BACK PAIN POTENTIALLY ASSOCIATED WITH RADICULOPATHY: Primary | ICD-10-CM

## 2018-12-19 NOTE — TELEPHONE ENCOUNTER
Pt has had back pain for three weeks. Flexeril was not effective in alleviating pain. Steroid made a difference but did not completely knock the pain out. Pt states that although severity has decreased pain is still present and constant.   Pt can be reac

## 2018-12-20 NOTE — TELEPHONE ENCOUNTER
Please call back- put in order for MRI as it would be reasonable to evaluate further. I will let him know once results come back. Thanks!   Larry Melgar

## 2018-12-21 NOTE — TELEPHONE ENCOUNTER
Notified pt that MRI of lumbar spine has been ordered. Has this been approved? Please call pt and notifiy him when it is approved. Routed to LESTER Schreiber.

## 2018-12-26 NOTE — TELEPHONE ENCOUNTER
Patient notified if he gets his MRI in before 12/31 he is good to go with his insurance. After the 31st his insurance is making some changes and it will require more work to get this approved through [employer].   Patient understands he will call now to get sc

## 2018-12-28 ENCOUNTER — HOSPITAL ENCOUNTER (OUTPATIENT)
Dept: MRI IMAGING | Age: 63
Discharge: HOME | End: 2018-12-28
Attending: PHYSICIAN ASSISTANT
Payer: COMMERCIAL

## 2018-12-28 DIAGNOSIS — M54.50 LOW BACK PAIN POTENTIALLY ASSOCIATED WITH RADICULOPATHY: ICD-10-CM

## 2018-12-28 PROCEDURE — 72148 MRI LUMBAR SPINE W/O DYE: CPT | Performed by: PHYSICIAN ASSISTANT

## 2019-02-06 ENCOUNTER — OFFICE VISIT (OUTPATIENT)
Dept: HEMATOLOGY/ONCOLOGY | Facility: HOSPITAL | Age: 64
End: 2019-02-06
Attending: INTERNAL MEDICINE
Payer: COMMERCIAL

## 2019-02-06 VITALS
RESPIRATION RATE: 18 BRPM | OXYGEN SATURATION: 96 % | SYSTOLIC BLOOD PRESSURE: 152 MMHG | WEIGHT: 225 LBS | HEART RATE: 74 BPM | DIASTOLIC BLOOD PRESSURE: 92 MMHG | BODY MASS INDEX: 32 KG/M2 | TEMPERATURE: 98 F

## 2019-02-06 DIAGNOSIS — C50.121 MALIGNANT NEOPLASM OF CENTRAL PORTION OF RIGHT BREAST IN MALE, ESTROGEN RECEPTOR POSITIVE (HCC): Primary | ICD-10-CM

## 2019-02-06 DIAGNOSIS — Z17.0 MALIGNANT NEOPLASM OF CENTRAL PORTION OF RIGHT BREAST IN MALE, ESTROGEN RECEPTOR POSITIVE (HCC): Primary | ICD-10-CM

## 2019-02-06 PROCEDURE — 99214 OFFICE O/P EST MOD 30 MIN: CPT | Performed by: INTERNAL MEDICINE

## 2019-02-06 NOTE — PROGRESS NOTES
Cancer Center Progress Note    Problem List:      Patient Active Problem List:     Prostate cancer ([facility] [address]     Pain in joint, ankle and foot     Hyperlipidemia     Vitamin D deficiency     Insomnia     Hemangioma     Nephrolithiasis     Malignant neoplasm of 20 MG Oral Tab TAKE ONE TABLET BY MOUTH ONE TIME DAILY  Disp: 30 tablet Rfl: 10   VIAGRA 100 MG Oral Tab as needed.    Disp:  Rfl: 11         Vital Signs:      Height: --  Weight: 102.1 kg (225 lb) (02/06 0807)  BSA (Calculated - sq m): --  Pulse: 74 (02/06 Findings and recommendations were given to the patient.     =====  CONCLUSION:     BIRADS Code 1: NEGATIVE ASSESSMENT.          Impression:     Stage IIA right breast cancer in the central breast:  1 node with micrometastases out of 14 nodes removed  Tumor

## 2019-02-06 NOTE — PROGRESS NOTES
Patient is here for MD f/u for male breast cancer. On Tamoxifen. Intermittent hot flashes but mild. Appetite and energy level is good.        Education Record    Learner:  Patient    Disease / Diagnosis:  Male breast cancer     Barriers / Limitations:  None

## 2019-02-14 ENCOUNTER — OFFICE VISIT (OUTPATIENT)
Dept: SURGERY | Facility: CLINIC | Age: 64
End: 2019-02-14
Payer: COMMERCIAL

## 2019-02-14 VITALS — SYSTOLIC BLOOD PRESSURE: 120 MMHG | DIASTOLIC BLOOD PRESSURE: 80 MMHG | HEART RATE: 84 BPM

## 2019-02-14 DIAGNOSIS — M48.07 FORAMINAL STENOSIS OF LUMBOSACRAL REGION: ICD-10-CM

## 2019-02-14 DIAGNOSIS — M51.37 DDD (DEGENERATIVE DISC DISEASE), LUMBOSACRAL: ICD-10-CM

## 2019-02-14 DIAGNOSIS — M43.07 SPONDYLOLYSIS OF LUMBOSACRAL REGION: Primary | ICD-10-CM

## 2019-02-14 DIAGNOSIS — M54.50 ACUTE LEFT-SIDED LOW BACK PAIN WITHOUT SCIATICA: ICD-10-CM

## 2019-02-14 PROCEDURE — 99215 OFFICE O/P EST HI 40 MIN: CPT | Performed by: PHYSICIAN ASSISTANT

## 2019-02-14 NOTE — H&P
NEUROSURGERY CLINIC VISIT      Francisca Keys  10/5/1955      CC: Back pain      HPI:   Francisca Keys is a 59year old male with a PMH of insomnia, PRICILA on CPAP, hyperlipidemia, prostate CA, n visit with ED. States good control of bladder/bowel. States hx of urinary issues/leakage since 2015 when he had sx for prostate cancer.      Mild gait instability, states he feels this is due to several surgeries on his feet which has resulted in residual f of the thumb   • LAPAROSCOPY, SURGICAL PROSTATECTOMY, RETROPUBIC RADICAL, W/NERVE SPARING  1/2015   • LEG/ANKLE SURGERY [address]    first left then right   • MASTECTOMY RIGHT      SENTINEL AND AXILLARY LYMPH NODE REMOVAL   • OTHER SURGICAL 2      Binge frequency: Never      Comment: 2-3 drinks a week    Drug use: No        Levaquin [Levofloxa*    SWELLING    Comment:Other reaction(s):  Other             Insomnia; swelling             Insomnia; swelling      REVIEW OF SYSTEMS:  Comprehensive r were used. Dose information is transmitted to the ACR [employer] of [employer]) NRDR ([address]) which includes the Dose Index Registry.      PATIENT STATED HISTORY: (As transcribed by Technologist)  Patient presents with LT l that radiates down the left buttocks for one month with no injury.       FINDINGS:       There are degenerative disc changes with loss of T2 disc signal, as well as facet changes present in the lumbar spine, of varying degrees at different levels, described visible thoracic spinal cord, and conus medullaris appear unremarkable. No evidence for metastatic disease, osteomyelitis, discitis or other aggressive process in the spine.       Only seen at the very lowest margin of the large field of view localizer mm which is too small for characterization. BILIARY:  No visible dilatation or calcification. PANCREAS:  No lesion, fluid collection, ductal dilatation, or atrophy. SPLEEN:  No enlargement or focal lesion. AORTA/VASCULAR:  No aneurysm.     RETRO foraminal stenosis, L>R.   - Ordered today:    - None  3. Activity:    - PT ordered  4. Pain management:   - Patient would like to hold on pain management/injections at this time, will start with PT.  Patient to call if no improvement with PT over the next

## 2019-02-14 NOTE — PROGRESS NOTES
Location of Pain: Low left side    Date Pain Began: November 2018          Work Related:   No        Receiving Work Comp/Disability:   No    Numeric Rating Scale:  Pain at Present:  4

## 2019-02-15 ENCOUNTER — LAB ENCOUNTER (OUTPATIENT)
Dept: LAB | Age: 64
End: 2019-02-15
Attending: INTERNAL MEDICINE
Payer: COMMERCIAL

## 2019-02-15 DIAGNOSIS — N18.2 CKD (CHRONIC KIDNEY DISEASE) STAGE 2, GFR 60-89 ML/MIN: ICD-10-CM

## 2019-02-15 DIAGNOSIS — R31.29 MICROSCOPIC HEMATURIA: ICD-10-CM

## 2019-02-15 DIAGNOSIS — N39.0 RECURRENT UTI: ICD-10-CM

## 2019-02-15 LAB
BILIRUB UR QL STRIP.AUTO: NEGATIVE
COLOR UR AUTO: YELLOW
GLUCOSE UR STRIP.AUTO-MCNC: NEGATIVE MG/DL
KETONES UR STRIP.AUTO-MCNC: NEGATIVE MG/DL
NITRITE UR QL STRIP.AUTO: NEGATIVE
PH UR STRIP.AUTO: 7 [PH] (ref 4.5–8)
PROT UR STRIP.AUTO-MCNC: NEGATIVE MG/DL
RBC #/AREA URNS AUTO: >10 /HPF
SP GR UR STRIP.AUTO: 1.01 (ref 1–1.03)
UROBILINOGEN UR STRIP.AUTO-MCNC: <2 MG/DL
WBC CLUMPS UR QL AUTO: PRESENT

## 2019-02-15 PROCEDURE — 81001 URINALYSIS AUTO W/SCOPE: CPT | Performed by: INTERNAL MEDICINE

## 2019-02-15 PROCEDURE — 87088 URINE BACTERIA CULTURE: CPT | Performed by: INTERNAL MEDICINE

## 2019-02-15 PROCEDURE — 87186 SC STD MICRODIL/AGAR DIL: CPT | Performed by: INTERNAL MEDICINE

## 2019-02-15 PROCEDURE — 87086 URINE CULTURE/COLONY COUNT: CPT | Performed by: INTERNAL MEDICINE

## 2019-02-19 ENCOUNTER — TELEPHONE (OUTPATIENT)
Dept: NEPHROLOGY | Facility: CLINIC | Age: 64
End: 2019-02-19

## 2019-02-19 RX ORDER — SULFAMETHOXAZOLE AND TRIMETHOPRIM 800; 160 MG/1; MG/1
1 TABLET ORAL 2 TIMES DAILY
Qty: 28 TABLET | Refills: 3 | Status: SHIPPED | OUTPATIENT
Start: 2019-02-19 | End: 2019-03-06 | Stop reason: ALTCHOICE

## 2019-03-06 ENCOUNTER — OFFICE VISIT (OUTPATIENT)
Dept: FAMILY MEDICINE CLINIC | Facility: CLINIC | Age: 64
End: 2019-03-06
Payer: COMMERCIAL

## 2019-03-06 VITALS
HEART RATE: 84 BPM | WEIGHT: 220 LBS | DIASTOLIC BLOOD PRESSURE: 78 MMHG | TEMPERATURE: 99 F | RESPIRATION RATE: 14 BRPM | BODY MASS INDEX: 31.5 KG/M2 | SYSTOLIC BLOOD PRESSURE: 126 MMHG | HEIGHT: 70 IN

## 2019-03-06 DIAGNOSIS — J01.41 ACUTE RECURRENT PANSINUSITIS: Primary | ICD-10-CM

## 2019-03-06 DIAGNOSIS — Z00.00 LABORATORY EXAMINATION ORDERED AS PART OF A ROUTINE GENERAL MEDICAL EXAMINATION: ICD-10-CM

## 2019-03-06 PROCEDURE — 99213 OFFICE O/P EST LOW 20 MIN: CPT | Performed by: FAMILY MEDICINE

## 2019-03-06 RX ORDER — CEFUROXIME AXETIL 500 MG/1
500 TABLET ORAL 2 TIMES DAILY
Qty: 20 TABLET | Refills: 0 | Status: SHIPPED | OUTPATIENT
Start: 2019-03-06 | End: 2019-03-16

## 2019-03-06 RX ORDER — GUAIFENESIN 400 MG/1
600 TABLET ORAL EVERY 6 HOURS PRN
Qty: 56 TABLET | Refills: 0 | COMMUNITY
Start: 2019-03-06 | End: 2019-03-20

## 2019-03-06 NOTE — PROGRESS NOTES
Patient presents with:  Sinus Problem: x 1 week   Cough      Subjective   HPI:   This is a 59year old male coming in for 1 week of sinus sx, coughing fits and green phrlegm, Ha, not getting better 7 days into it. Bactrim DS for E coli, no change.      HPI He is alert and oriented to person, place, and time. Skin: Skin is warm and dry.             Assessment and Plan:     Problem List Items Addressed This Visit     None      Visit Diagnoses     Acute recurrent pansinusitis    -  Primary    Relevant Gonzalez Tan

## 2019-04-02 ENCOUNTER — APPOINTMENT (OUTPATIENT)
Dept: LAB | Age: 64
End: 2019-04-02
Attending: FAMILY MEDICINE
Payer: COMMERCIAL

## 2019-04-02 DIAGNOSIS — Z00.00 LABORATORY EXAMINATION ORDERED AS PART OF A ROUTINE GENERAL MEDICAL EXAMINATION: ICD-10-CM

## 2019-04-02 PROCEDURE — 36415 COLL VENOUS BLD VENIPUNCTURE: CPT | Performed by: FAMILY MEDICINE

## 2019-04-02 PROCEDURE — 85027 COMPLETE CBC AUTOMATED: CPT | Performed by: FAMILY MEDICINE

## 2019-04-02 PROCEDURE — 80053 COMPREHEN METABOLIC PANEL: CPT | Performed by: FAMILY MEDICINE

## 2019-04-02 PROCEDURE — 80061 LIPID PANEL: CPT | Performed by: FAMILY MEDICINE

## 2019-04-02 PROCEDURE — 82306 VITAMIN D 25 HYDROXY: CPT | Performed by: FAMILY MEDICINE

## 2019-04-04 ENCOUNTER — OFFICE VISIT (OUTPATIENT)
Dept: FAMILY MEDICINE CLINIC | Facility: CLINIC | Age: 64
End: 2019-04-04
Payer: COMMERCIAL

## 2019-04-04 ENCOUNTER — TELEPHONE (OUTPATIENT)
Dept: FAMILY MEDICINE CLINIC | Facility: CLINIC | Age: 64
End: 2019-04-04

## 2019-04-04 VITALS
WEIGHT: 220 LBS | HEIGHT: 69.5 IN | DIASTOLIC BLOOD PRESSURE: 80 MMHG | SYSTOLIC BLOOD PRESSURE: 130 MMHG | BODY MASS INDEX: 31.85 KG/M2 | RESPIRATION RATE: 12 BRPM | TEMPERATURE: 99 F | HEART RATE: 60 BPM

## 2019-04-04 DIAGNOSIS — E78.2 MIXED HYPERLIPIDEMIA: Primary | ICD-10-CM

## 2019-04-04 DIAGNOSIS — F51.01 PRIMARY INSOMNIA: ICD-10-CM

## 2019-04-04 DIAGNOSIS — Z12.5 SCREENING FOR PROSTATE CANCER: ICD-10-CM

## 2019-04-04 DIAGNOSIS — Z00.00 LABORATORY EXAMINATION ORDERED AS PART OF A ROUTINE GENERAL MEDICAL EXAMINATION: ICD-10-CM

## 2019-04-04 DIAGNOSIS — D69.6 THROMBOCYTOPENIA (HCC): ICD-10-CM

## 2019-04-04 DIAGNOSIS — R79.89 ELEVATED LFTS: ICD-10-CM

## 2019-04-04 PROCEDURE — 99214 OFFICE O/P EST MOD 30 MIN: CPT | Performed by: FAMILY MEDICINE

## 2019-04-04 RX ORDER — SILDENAFIL 100 MG/1
100 TABLET, FILM COATED ORAL
Qty: 30 TABLET | Refills: 3 | Status: SHIPPED | OUTPATIENT
Start: 2019-04-04

## 2019-04-04 RX ORDER — ATORVASTATIN CALCIUM 40 MG/1
40 TABLET, FILM COATED ORAL
Qty: 90 TABLET | Refills: 3 | Status: SHIPPED | OUTPATIENT
Start: 2019-04-04 | End: 2020-03-22

## 2019-04-04 RX ORDER — ZOLPIDEM TARTRATE 12.5 MG/1
TABLET, FILM COATED, EXTENDED RELEASE ORAL
Qty: 30 TABLET | Refills: 5 | Status: SHIPPED | OUTPATIENT
Start: 2019-04-04 | End: 2019-10-03

## 2019-04-04 NOTE — PROGRESS NOTES
HPI:   Patient presents with:  Cholesterol: medication f/u     Subjective   Rashi Vann is a 59year old male who presents for recheck of his hypertension. He has been taking medications as instructed, with no medication side effects.  His home BP monit Head: Normocephalic. Neck: Normal range of motion. Cardiovascular: Normal rate, regular rhythm, S1 normal, S2 normal and normal heart sounds. No murmur heard.   Pulses:       Posterior tibial pulses are 2+ on the right side, and 2+ on the left side

## 2019-04-04 NOTE — TELEPHONE ENCOUNTER
Please enter lab orders for the patient's upcoming physical appointment. Physical scheduled:    Your appointments     Date & Time Appointment Department [facility])    Oct 03, 2019  7:45 AM CDT Physical - Established Patient with MD Franklin Mullins

## 2019-04-30 ENCOUNTER — OFFICE VISIT (OUTPATIENT)
Dept: SURGERY | Facility: CLINIC | Age: 64
End: 2019-04-30
Payer: COMMERCIAL

## 2019-04-30 VITALS — DIASTOLIC BLOOD PRESSURE: 80 MMHG | HEART RATE: 72 BPM | SYSTOLIC BLOOD PRESSURE: 140 MMHG

## 2019-04-30 DIAGNOSIS — M51.37 DDD (DEGENERATIVE DISC DISEASE), LUMBOSACRAL: ICD-10-CM

## 2019-04-30 DIAGNOSIS — M43.07 SPONDYLOLYSIS OF LUMBOSACRAL REGION: ICD-10-CM

## 2019-04-30 DIAGNOSIS — M48.07 FORAMINAL STENOSIS OF LUMBOSACRAL REGION: ICD-10-CM

## 2019-04-30 DIAGNOSIS — M54.50 ACUTE LEFT-SIDED LOW BACK PAIN WITHOUT SCIATICA: Primary | ICD-10-CM

## 2019-04-30 PROCEDURE — 99213 OFFICE O/P EST LOW 20 MIN: CPT | Performed by: PHYSICIAN ASSISTANT

## 2019-04-30 NOTE — PROGRESS NOTES
Pt is here for follow up for PT. Pt states that he has been doing PT once weekly. Pt states that he feels like PT helps him. Pt states that he is better since LOV.

## 2019-04-30 NOTE — PROGRESS NOTES
Patient: Vida Gomez  Medical Record Number: [id number]  YOB: 1955  PCP: Kehinde Leiva MD    Reason for visit: Lumbar follow up    HISTORY OF CHIEF COMPLAINT:    Vida Gomez is a very pleasant 59year old male, with a pertinent PMH of medrol dose pack. Recommended supportive cares, including gentle stretching, rest, ice/heat.     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 Frequently bending, lifting, straining and reaching. Typically lifts between 100-125 pounds.  Does not remembe left upper arm   • Basal cell carcinoma 12/12/2012    right shoulder; left forearm   • Basal cell carcinoma 06/19/2002    right forehead   • Calculus of kidney    • Hyperlipidemia    • Personal history of arthritis    • Prostate cancer ([facility] [address] 1/2015    U • Heart Disease Mother    • Breast Cancer Mother 76        d. 80   • Other (Benign breast biopsy) Sister 39   • Other (Benign breast biopsy) Sister    • Breast Cancer Paternal Aunt         d.92   • Cancer Maternal Uncle         brain ca; d.40's   • Other Rfl:         REVIEW OF SYSTEMS   Comprehensive review of systems done. Negative except what is outlined in the above HPI. PHYSICAL EXAMIMATION    vitals were not taken for this visit. GENERAL: Very pleasant patient is in no apparent distress.  Sitting understanding and is appreciative. All questions were sought out and thoroughly answered to satisfaction. Total visit time: 15 min  More than 50% spent coordinating care, providing patient education, and counseling. Abdifatah Rider M.S., PABetsyC

## 2019-06-09 DIAGNOSIS — C61 PROSTATE CANCER (HCC): ICD-10-CM

## 2019-06-10 RX ORDER — TAMOXIFEN CITRATE 20 MG/1
20 TABLET ORAL
Qty: 30 TABLET | Refills: 10 | Status: SHIPPED | OUTPATIENT
Start: 2019-06-10 | End: 2020-04-19

## 2019-06-10 RX ORDER — TAMOXIFEN CITRATE 20 MG/1
20 TABLET ORAL
Qty: 30 TABLET | Refills: 10 | Status: SHIPPED | OUTPATIENT
Start: 2019-06-10 | End: 2019-06-10

## 2019-06-20 ENCOUNTER — LAB ENCOUNTER (OUTPATIENT)
Dept: LAB | Age: 64
End: 2019-06-20
Attending: FAMILY MEDICINE
Payer: COMMERCIAL

## 2019-06-20 DIAGNOSIS — N18.2 CKD (CHRONIC KIDNEY DISEASE) STAGE 2, GFR 60-89 ML/MIN: ICD-10-CM

## 2019-06-20 DIAGNOSIS — R79.89 ELEVATED LFTS: ICD-10-CM

## 2019-06-20 DIAGNOSIS — R31.29 MICROSCOPIC HEMATURIA: ICD-10-CM

## 2019-06-20 DIAGNOSIS — N39.0 RECURRENT UTI: ICD-10-CM

## 2019-06-20 PROCEDURE — 36415 COLL VENOUS BLD VENIPUNCTURE: CPT | Performed by: FAMILY MEDICINE

## 2019-06-20 PROCEDURE — 80076 HEPATIC FUNCTION PANEL: CPT | Performed by: FAMILY MEDICINE

## 2019-06-20 PROCEDURE — 87086 URINE CULTURE/COLONY COUNT: CPT | Performed by: INTERNAL MEDICINE

## 2019-06-20 PROCEDURE — 81001 URINALYSIS AUTO W/SCOPE: CPT | Performed by: INTERNAL MEDICINE

## 2019-06-25 ENCOUNTER — TELEPHONE (OUTPATIENT)
Dept: FAMILY MEDICINE CLINIC | Facility: CLINIC | Age: 64
End: 2019-06-25

## 2019-06-25 ENCOUNTER — TELEPHONE (OUTPATIENT)
Dept: NEPHROLOGY | Facility: CLINIC | Age: 64
End: 2019-06-25

## 2019-06-25 DIAGNOSIS — R17 ELEVATED BILIRUBIN: Primary | ICD-10-CM

## 2019-06-25 NOTE — PROGRESS NOTES
Left message for patient to call back regarding test results.  Ordered ultrasound per annelise carvalho

## 2019-07-23 ENCOUNTER — OFFICE VISIT (OUTPATIENT)
Dept: FAMILY MEDICINE CLINIC | Facility: CLINIC | Age: 64
End: 2019-07-23
Payer: COMMERCIAL

## 2019-07-23 VITALS
BODY MASS INDEX: 31.16 KG/M2 | HEART RATE: 72 BPM | DIASTOLIC BLOOD PRESSURE: 62 MMHG | HEIGHT: 69.5 IN | WEIGHT: 215.19 LBS | TEMPERATURE: 100 F | RESPIRATION RATE: 12 BRPM | SYSTOLIC BLOOD PRESSURE: 124 MMHG

## 2019-07-23 DIAGNOSIS — J06.9 ACUTE URI: ICD-10-CM

## 2019-07-23 DIAGNOSIS — R31.9 HEMATURIA, UNSPECIFIED TYPE: ICD-10-CM

## 2019-07-23 DIAGNOSIS — R30.9 PAINFUL URINATION: Primary | ICD-10-CM

## 2019-07-23 LAB
BILIRUBIN: NEGATIVE
GLUCOSE (URINE DIPSTICK): NEGATIVE MG/DL
KETONES (URINE DIPSTICK): NEGATIVE MG/DL
MULTISTIX LOT#: ABNORMAL NUMERIC
NITRITE, URINE: NEGATIVE
PH, URINE: 7 (ref 4.5–8)
PROTEIN (URINE DIPSTICK): NEGATIVE MG/DL
SPECIFIC GRAVITY: 1.01 (ref 1–1.03)
UROBILINOGEN,SEMI-QN: 0.2 MG/DL (ref 0–1.9)

## 2019-07-23 PROCEDURE — 87086 URINE CULTURE/COLONY COUNT: CPT | Performed by: NURSE PRACTITIONER

## 2019-07-23 PROCEDURE — 99214 OFFICE O/P EST MOD 30 MIN: CPT | Performed by: NURSE PRACTITIONER

## 2019-07-23 PROCEDURE — 81003 URINALYSIS AUTO W/O SCOPE: CPT | Performed by: NURSE PRACTITIONER

## 2019-07-23 RX ORDER — SULFAMETHOXAZOLE AND TRIMETHOPRIM 800; 160 MG/1; MG/1
1 TABLET ORAL 2 TIMES DAILY
Qty: 14 TABLET | Refills: 0 | Status: SHIPPED | OUTPATIENT
Start: 2019-07-23 | End: 2019-08-07

## 2019-07-23 NOTE — PROGRESS NOTES
Patient presents with:  Painful Urination: x 1week  Cough: chest congestion       HPI:  Presents with approx 1 week history of dysuria, dark/cloudy appearing urine, right sided low back pain and some blood in urine as well.  Has some mild urinary stress inc Problem List:     Prostate cancer ([facility] [address]     Pain in joint, ankle and foot     Hyperlipidemia     Vitamin D deficiency     Insomnia     Hemangioma     Nephrolithiasis     Malignant neoplasm of central portion of right male breast [facility])     Family history of tenderness. BS(+)x4. Skin: Skin is warm and dry. No rash noted. No erythema. No pallor. A/P:    Painful urination  (primary encounter diagnosis)- In office U/A suggestive of UTI. Will start Bactrim and send for culture.    Hematuria, unspecified ty

## 2019-07-26 ENCOUNTER — OFFICE VISIT (OUTPATIENT)
Dept: SURGERY | Facility: CLINIC | Age: 64
End: 2019-07-26
Payer: COMMERCIAL

## 2019-07-26 VITALS
OXYGEN SATURATION: 95 % | SYSTOLIC BLOOD PRESSURE: 146 MMHG | HEART RATE: 71 BPM | WEIGHT: 214.63 LBS | RESPIRATION RATE: 16 BRPM | DIASTOLIC BLOOD PRESSURE: 80 MMHG | HEIGHT: 69.5 IN | BODY MASS INDEX: 31.07 KG/M2

## 2019-07-26 DIAGNOSIS — Z12.39 SCREENING FOR BREAST CANCER: ICD-10-CM

## 2019-07-26 DIAGNOSIS — Z85.3 HISTORY OF RIGHT BREAST CANCER: Primary | ICD-10-CM

## 2019-07-26 PROCEDURE — 99214 OFFICE O/P EST MOD 30 MIN: CPT | Performed by: SURGERY

## 2019-07-27 ENCOUNTER — HOSPITAL ENCOUNTER (OUTPATIENT)
Dept: ULTRASOUND IMAGING | Age: 64
Discharge: HOME | End: 2019-07-27
Attending: FAMILY MEDICINE
Payer: COMMERCIAL

## 2019-07-27 DIAGNOSIS — R17 ELEVATED BILIRUBIN: ICD-10-CM

## 2019-07-27 PROCEDURE — 76700 US EXAM ABDOM COMPLETE: CPT | Performed by: FAMILY MEDICINE

## 2019-07-30 NOTE — PROGRESS NOTES
Received a call from central scheduling. Men need diagnostic mammogram, not screening. Order placed.

## 2019-07-31 NOTE — PROGRESS NOTES
Breast Surgery Follow-Up Visit    Diagnosis: Infiltrating Ductal Carcinoma, right breast; ER positive, [place] positive status post modified radical mastectomy without reconstruction on December 15, 2015.     Stage: [id number] (Stage IB)    Disease Status:  Danni Barker of kidney    • Hyperlipidemia    • Personal history of arthritis    • Prostate cancer ([address] 1/2015    [facility]   • Squamous cell carcinoma 2/4/2016    in-situ, mid upper forehead   • Squamous cell carcinoma 4/8/2014    in-situ, mid upper chest Sildenafil Citrate (VIAGRA) 100 MG Oral Tab Take 1 tablet (100 mg total) by mouth daily as needed. Disp: 30 tablet Rfl: 3   Cholecalciferol (VITAMIN D) 2000 units Oral Cap Take by mouth daily.    Disp:  Rfl:      No current facility-administered medicatio bleeds, snoring, pain in mouth/throat, hoarseness, change in voice, facial trauma.     Respiratory:  The patient denies chronic cough, phlegm, hemoptysis, pleurisy/chest pain, pneumonia, asthma, wheezing, difficulty in breathing with exertion, emphysema, ch of poor/slow wound healing, weight loss/gain, fertility or hormone problems, cold intolerance, thyroid disease. Allergic/Immunologic:  There is no history of hives, hay fever, angioedema or anaphylaxis.     /80 (BP Location: Left arm, Patient Posi 25 minutes, more than 50% of which was dedicated to the discussion of management options.

## 2019-08-02 ENCOUNTER — LAB ENCOUNTER (OUTPATIENT)
Dept: LAB | Age: 64
End: 2019-08-02
Attending: NURSE PRACTITIONER
Payer: COMMERCIAL

## 2019-08-02 DIAGNOSIS — R17 ELEVATED BILIRUBIN: ICD-10-CM

## 2019-08-02 DIAGNOSIS — R79.89 ELEVATED LFTS: ICD-10-CM

## 2019-08-02 LAB
HAV IGM SER QL: NONREACTIVE
HBV CORE IGM SER QL: NONREACTIVE
HBV SURFACE AG SERPL QL IA: NONREACTIVE
HCV AB SERPL QL IA: NONREACTIVE

## 2019-08-02 PROCEDURE — 36415 COLL VENOUS BLD VENIPUNCTURE: CPT | Performed by: NURSE PRACTITIONER

## 2019-08-02 PROCEDURE — 80074 ACUTE HEPATITIS PANEL: CPT | Performed by: NURSE PRACTITIONER

## 2019-08-07 ENCOUNTER — OFFICE VISIT (OUTPATIENT)
Dept: HEMATOLOGY/ONCOLOGY | Facility: HOSPITAL | Age: 64
End: 2019-08-07
Attending: INTERNAL MEDICINE
Payer: COMMERCIAL

## 2019-08-07 VITALS
HEART RATE: 71 BPM | OXYGEN SATURATION: 96 % | TEMPERATURE: 98 F | SYSTOLIC BLOOD PRESSURE: 138 MMHG | RESPIRATION RATE: 18 BRPM | HEIGHT: 70 IN | DIASTOLIC BLOOD PRESSURE: 75 MMHG | BODY MASS INDEX: 30.35 KG/M2 | WEIGHT: 212 LBS

## 2019-08-07 DIAGNOSIS — C61 PROSTATE CANCER (HCC): ICD-10-CM

## 2019-08-07 DIAGNOSIS — D69.6 THROMBOCYTOPENIA (HCC): ICD-10-CM

## 2019-08-07 DIAGNOSIS — C50.121 MALIGNANT NEOPLASM OF CENTRAL PORTION OF RIGHT BREAST IN MALE, ESTROGEN RECEPTOR POSITIVE (HCC): Primary | ICD-10-CM

## 2019-08-07 DIAGNOSIS — Z17.0 MALIGNANT NEOPLASM OF CENTRAL PORTION OF RIGHT BREAST IN MALE, ESTROGEN RECEPTOR POSITIVE (HCC): Primary | ICD-10-CM

## 2019-08-07 PROCEDURE — 99213 OFFICE O/P EST LOW 20 MIN: CPT | Performed by: INTERNAL MEDICINE

## 2019-08-07 NOTE — PROGRESS NOTES
Cancer Center Progress Note    Problem List:      Patient Active Problem List:     Prostate cancer ([facility] [address]     Pain in joint, ankle and foot     Hyperlipidemia     Vitamin D deficiency     Insomnia     Hemangioma     Nephrolithiasis     Malignant neoplasm of and weight loss. Eyes: Negative for visual disturbance, irritation and redness. Respiratory: Negative for cough, hemoptysis, chest pain, or dyspnea. Cardiovascular: Negative for angina, orthopnea or palpitations.   Gastrointestinal: Negative for nausea, OR   • COLONOSCOPY  12/15/2018   • COLONOSCOPY N/A 12/5/2016    Performed by Fifi Calhoun MD at [facility] ENDOSCOPY   • COLONOSCOPY & POLYPECTOMY  12/5/16    small polyp at prior polypectomy site    • FEMUR/KNEE SURG UNLISTED      right knee   • FOOT/TOES JHONNY kg (212 lb) (08/07 0751)  BSA (Calculated - sq m): 2.14 sq meters (08/07 0751)  Pulse: 71 (08/07 0751)  BP: 138/75 (08/07 0751)  Temp: 97.5 °F (36.4 °C) (08/07 0751)  Do Not Use - Resp Rate: --  SpO2: 96 % (08/07 0751)      Performance Status:  ECOG 0: Ful fossa measuring 1.8 x 1.2 x 1.3 cm, likely representing focal fatty sparing. No additional findings. BILIARY:  Normal appearing gallbladder, intrahepatic ducts, and common bile duct. Common bile duct diameter is 3 mm.   PANCREAS:  Normal.  SPLEEN:  Craig Carwin

## 2019-08-16 ENCOUNTER — HOSPITAL ENCOUNTER (OUTPATIENT)
Dept: MAMMOGRAPHY | Age: 64
Discharge: HOME | End: 2019-08-16
Attending: SURGERY
Payer: COMMERCIAL

## 2019-08-16 DIAGNOSIS — Z85.3 HISTORY OF RIGHT BREAST CANCER: ICD-10-CM

## 2019-08-16 DIAGNOSIS — Z12.39 SCREENING FOR BREAST CANCER: ICD-10-CM

## 2019-08-16 PROCEDURE — 77065 DX MAMMO INCL CAD UNI: CPT | Performed by: SURGERY

## 2019-08-16 PROCEDURE — 77061 BREAST TOMOSYNTHESIS UNI: CPT | Performed by: SURGERY

## 2019-08-19 ENCOUNTER — OFFICE VISIT (OUTPATIENT)
Dept: SURGERY | Facility: CLINIC | Age: 64
End: 2019-08-19
Payer: COMMERCIAL

## 2019-08-19 VITALS
WEIGHT: 216 LBS | RESPIRATION RATE: 16 BRPM | BODY MASS INDEX: 31 KG/M2 | HEART RATE: 70 BPM | DIASTOLIC BLOOD PRESSURE: 93 MMHG | SYSTOLIC BLOOD PRESSURE: 156 MMHG | OXYGEN SATURATION: 97 %

## 2019-08-19 DIAGNOSIS — K76.0 NAFLD (NONALCOHOLIC FATTY LIVER DISEASE): Primary | ICD-10-CM

## 2019-08-19 NOTE — PROGRESS NOTES
[facility]  [address], [address]  [address]  [phone] ([phone]) in-situ, right posterior neck; in-situ, right upper arm      Past Surgical History:   Procedure Laterality Date   • BREAST SENTINEL LYMPH NODE BIOPSY Right 12/15/2015    Performed by Simona Arceo MD at [facility] MAIN OR   • COLONOSCOPY  12/15/2018   • COLON Alcohol/week: 2.0 - 3.0 standard drinks      Types: 2 - 3 Standard drinks or equivalent per week      Frequency: 2-3 times a week      Drinks per session: 1 or 2      Binge frequency: Never      Comment: 2-3 drinks a week    Drug use: No         Current Ou (fax)  [phone] (mobile)  [email or web address]

## 2019-08-20 ENCOUNTER — APPOINTMENT (OUTPATIENT)
Dept: LAB | Age: 64
End: 2019-08-20
Attending: INTERNAL MEDICINE
Payer: COMMERCIAL

## 2019-08-20 DIAGNOSIS — K76.0 NAFLD (NONALCOHOLIC FATTY LIVER DISEASE): ICD-10-CM

## 2019-08-20 LAB
DEPRECATED HBV CORE AB SER IA-ACNC: 243.9 NG/ML (ref 30–530)
HAV AB SER QL IA: NONREACTIVE
HBV SURFACE AB SER QL: NONREACTIVE
HBV SURFACE AB SERPL IA-ACNC: <3.1 MIU/ML

## 2019-08-20 PROCEDURE — 36415 COLL VENOUS BLD VENIPUNCTURE: CPT

## 2019-08-20 PROCEDURE — 82104 ALPHA-1-ANTITRYPSIN PHENO: CPT

## 2019-08-20 PROCEDURE — 86706 HEP B SURFACE ANTIBODY: CPT

## 2019-08-20 PROCEDURE — 82103 ALPHA-1-ANTITRYPSIN TOTAL: CPT

## 2019-08-20 PROCEDURE — 82728 ASSAY OF FERRITIN: CPT

## 2019-08-20 PROCEDURE — 86708 HEPATITIS A ANTIBODY: CPT

## 2019-08-22 ENCOUNTER — TELEPHONE (OUTPATIENT)
Dept: FAMILY MEDICINE CLINIC | Facility: CLINIC | Age: 64
End: 2019-08-22

## 2019-08-22 DIAGNOSIS — G47.33 OSA (OBSTRUCTIVE SLEEP APNEA): Primary | ICD-10-CM

## 2019-08-22 NOTE — TELEPHONE ENCOUNTER
Received form from [address] [place] [address] Requesting CPAP or BiPAP.  Form in Lisa's inbox

## 2019-08-22 NOTE — TELEPHONE ENCOUNTER
Referral request DME [facility] CPAP supplies    [facility]  Fax number: [phone]     quantity 1   quantity 1   quantity 6   quantity 1   quantity 6    Patient seen by Dr. Deshaun Weir M.D. 9/28/18  Office notes from Dr. Deshaun Weir M.D. 9/28/18  PRICILA on CPAP;

## 2019-08-23 LAB — ALPHA-1-ANTITRYPSIN: 153 MG/DL

## 2019-09-03 NOTE — TELEPHONE ENCOUNTER
Diagnoses and all orders for this visit:    Laboratory examination ordered as part of a routine general medical examination  -     TSH W REFLEX TO FREE T4; Future  -     COMP METABOLIC PANEL (14); Future  -     LIPID PANEL;  Future  -     CBC, PLATELET; NO

## 2019-09-27 ENCOUNTER — APPOINTMENT (OUTPATIENT)
Dept: LAB | Age: 64
End: 2019-09-27
Attending: FAMILY MEDICINE
Payer: COMMERCIAL

## 2019-09-27 DIAGNOSIS — Z12.5 SCREENING FOR PROSTATE CANCER: ICD-10-CM

## 2019-09-27 DIAGNOSIS — Z00.00 LABORATORY EXAMINATION ORDERED AS PART OF A ROUTINE GENERAL MEDICAL EXAMINATION: ICD-10-CM

## 2019-09-27 LAB
ALBUMIN SERPL-MCNC: 3.9 G/DL (ref 3.4–5)
ALBUMIN/GLOB SERPL: 1.4 {RATIO} (ref 1–2)
ALP LIVER SERPL-CCNC: 58 U/L (ref 45–117)
ALT SERPL-CCNC: 30 U/L (ref 16–61)
ANION GAP SERPL CALC-SCNC: 3 MMOL/L (ref 0–18)
AST SERPL-CCNC: 26 U/L (ref 15–37)
BILIRUB SERPL-MCNC: 1.5 MG/DL (ref 0.1–2)
BUN BLD-MCNC: 16 MG/DL (ref 7–18)
BUN/CREAT SERPL: 13 (ref 10–20)
CALCIUM BLD-MCNC: 8.6 MG/DL (ref 8.5–10.1)
CHLORIDE SERPL-SCNC: 107 MMOL/L (ref 98–112)
CHOLEST SMN-MCNC: 105 MG/DL (ref ?–200)
CO2 SERPL-SCNC: 28 MMOL/L (ref 21–32)
COMPLEXED PSA SERPL-MCNC: <0.01 NG/ML (ref ?–4)
CREAT BLD-MCNC: 1.23 MG/DL (ref 0.7–1.3)
DEPRECATED RDW RBC AUTO: 42.9 FL (ref 35.1–46.3)
ERYTHROCYTE [DISTWIDTH] IN BLOOD BY AUTOMATED COUNT: 12.7 % (ref 11–15)
GLOBULIN PLAS-MCNC: 2.8 G/DL (ref 2.8–4.4)
GLUCOSE BLD-MCNC: 100 MG/DL (ref 70–99)
HCT VFR BLD AUTO: 43.9 % (ref 39–53)
HDLC SERPL-MCNC: 34 MG/DL (ref 40–59)
HGB BLD-MCNC: 14.8 G/DL (ref 13–17.5)
LDLC SERPL CALC-MCNC: 55 MG/DL (ref ?–100)
M PROTEIN MFR SERPL ELPH: 6.7 G/DL (ref 6.4–8.2)
MCH RBC QN AUTO: 31.2 PG (ref 26–34)
MCHC RBC AUTO-ENTMCNC: 33.7 G/DL (ref 31–37)
MCV RBC AUTO: 92.4 FL (ref 80–100)
NONHDLC SERPL-MCNC: 71 MG/DL (ref ?–130)
OSMOLALITY SERPL CALC.SUM OF ELEC: 287 MOSM/KG (ref 275–295)
PLATELET # BLD AUTO: 134 10(3)UL (ref 150–450)
POTASSIUM SERPL-SCNC: 4.4 MMOL/L (ref 3.5–5.1)
RBC # BLD AUTO: 4.75 X10(6)UL (ref 4.3–5.7)
SODIUM SERPL-SCNC: 138 MMOL/L (ref 136–145)
TRIGL SERPL-MCNC: 81 MG/DL (ref 30–149)
TSI SER-ACNC: 3.03 MIU/ML (ref 0.36–3.74)
VLDLC SERPL CALC-MCNC: 16 MG/DL (ref 0–30)
WBC # BLD AUTO: 3.4 X10(3) UL (ref 4–11)

## 2019-09-27 PROCEDURE — 80050 GENERAL HEALTH PANEL: CPT | Performed by: FAMILY MEDICINE

## 2019-09-27 PROCEDURE — 36415 COLL VENOUS BLD VENIPUNCTURE: CPT | Performed by: FAMILY MEDICINE

## 2019-09-27 PROCEDURE — 80061 LIPID PANEL: CPT | Performed by: FAMILY MEDICINE

## 2019-09-27 PROCEDURE — 84153 ASSAY OF PSA TOTAL: CPT | Performed by: FAMILY MEDICINE

## 2019-10-03 ENCOUNTER — OFFICE VISIT (OUTPATIENT)
Dept: FAMILY MEDICINE CLINIC | Facility: CLINIC | Age: 64
End: 2019-10-03
Payer: COMMERCIAL

## 2019-10-03 VITALS
WEIGHT: 211.38 LBS | DIASTOLIC BLOOD PRESSURE: 80 MMHG | SYSTOLIC BLOOD PRESSURE: 138 MMHG | TEMPERATURE: 98 F | RESPIRATION RATE: 12 BRPM | HEIGHT: 69 IN | HEART RATE: 72 BPM | BODY MASS INDEX: 31.31 KG/M2

## 2019-10-03 DIAGNOSIS — K76.0 NAFLD (NONALCOHOLIC FATTY LIVER DISEASE): ICD-10-CM

## 2019-10-03 DIAGNOSIS — Z17.0 MALIGNANT NEOPLASM OF CENTRAL PORTION OF RIGHT BREAST IN MALE, ESTROGEN RECEPTOR POSITIVE (HCC): ICD-10-CM

## 2019-10-03 DIAGNOSIS — D69.6 THROMBOCYTOPENIA (HCC): ICD-10-CM

## 2019-10-03 DIAGNOSIS — L30.8 OTHER ECZEMA: ICD-10-CM

## 2019-10-03 DIAGNOSIS — F51.01 PRIMARY INSOMNIA: ICD-10-CM

## 2019-10-03 DIAGNOSIS — C50.121 MALIGNANT NEOPLASM OF CENTRAL PORTION OF RIGHT BREAST IN MALE, ESTROGEN RECEPTOR POSITIVE (HCC): ICD-10-CM

## 2019-10-03 DIAGNOSIS — M25.50 ARTHRALGIA, UNSPECIFIED JOINT: ICD-10-CM

## 2019-10-03 DIAGNOSIS — E55.9 VITAMIN D DEFICIENCY: ICD-10-CM

## 2019-10-03 DIAGNOSIS — Z00.00 ANNUAL PHYSICAL EXAM: Primary | ICD-10-CM

## 2019-10-03 DIAGNOSIS — E78.2 MIXED HYPERLIPIDEMIA: ICD-10-CM

## 2019-10-03 DIAGNOSIS — Z23 NEED FOR VACCINATION: ICD-10-CM

## 2019-10-03 PROCEDURE — 90471 IMMUNIZATION ADMIN: CPT | Performed by: FAMILY MEDICINE

## 2019-10-03 PROCEDURE — 90632 HEPA VACCINE ADULT IM: CPT | Performed by: FAMILY MEDICINE

## 2019-10-03 PROCEDURE — 90472 IMMUNIZATION ADMIN EACH ADD: CPT | Performed by: FAMILY MEDICINE

## 2019-10-03 PROCEDURE — 90746 HEPB VACCINE 3 DOSE ADULT IM: CPT | Performed by: FAMILY MEDICINE

## 2019-10-03 PROCEDURE — 99396 PREV VISIT EST AGE 40-64: CPT | Performed by: FAMILY MEDICINE

## 2019-10-03 RX ORDER — ZOLPIDEM TARTRATE 12.5 MG/1
TABLET, FILM COATED, EXTENDED RELEASE ORAL
Qty: 30 TABLET | Refills: 5 | Status: SHIPPED | OUTPATIENT
Start: 2019-10-03 | End: 2020-03-22

## 2019-10-03 NOTE — PROGRESS NOTES
Gloria Barnett is a 59year old male who presents for a complete physical exam.   HPI:   Patient presents with:  Physical: rash on left leg, joint pain x 2 months       His last annual assessment has been over 1 year: Annual Physical due on 09/28/2019 [address] 09/27/2019 08:03 AM       Lab Results   Component Value Date/Time    A1C 5.5 09/18/2018    VITD 34.7 04/02/2019 07:29 AM    PSA 5.530 (H) 04/23/2014 08:39 AM              Current Outpatient Medications on File Prior to Visit:  Tamoxifen Citrate list which includes the following long-term medication(s): sildenafil citrate. He is allergic to levaquin [levofloxacin]. He  reports that he has never smoked.  He has never used smokeless tobacco. He reports that he drinks about 2.0 - 3.0 standard drin present. Cardiovascular: Normal rate, regular rhythm, S1 normal, S2 normal, normal heart sounds and intact distal pulses. Exam reveals no gallop, no S3, no S4 and no friction rub. No murmur heard. Edema not present. Carotid bruit not present.   Pulmo 09/28/2019  Influenza Vaccine(1) due on 09/01/2019  Annual Depression Screen due on 08/07/2020  PSA due on 09/27/2020  Colonoscopy due on 12/15/2023    Pt info given for: exercise, low fat diet, The patient indicates understanding of these issues and agree PROTEIN    ARNAUD, DIRECT, REFLEX TO 9 ENAS      Other Visit Diagnoses     Annual physical exam    -  Primary    Relevant Orders    CYCLIC CITRULLINATE PEP.  IGG    SED RATE, WESTERGREN (AUTOMATED)    C-REACTIVE PROTEIN    ARNAUD, DIRECT, REFLEX TO 9 ENAS    SHELLY

## 2019-10-04 ENCOUNTER — APPOINTMENT (OUTPATIENT)
Dept: LAB | Age: 64
End: 2019-10-04
Attending: FAMILY MEDICINE
Payer: COMMERCIAL

## 2019-10-04 DIAGNOSIS — Z00.00 ANNUAL PHYSICAL EXAM: ICD-10-CM

## 2019-10-04 DIAGNOSIS — E55.9 VITAMIN D DEFICIENCY: ICD-10-CM

## 2019-10-04 DIAGNOSIS — D69.6 THROMBOCYTOPENIA (HCC): ICD-10-CM

## 2019-10-04 DIAGNOSIS — M25.50 ARTHRALGIA, UNSPECIFIED JOINT: ICD-10-CM

## 2019-10-04 PROCEDURE — 86140 C-REACTIVE PROTEIN: CPT | Performed by: FAMILY MEDICINE

## 2019-10-04 PROCEDURE — 86038 ANTINUCLEAR ANTIBODIES: CPT | Performed by: FAMILY MEDICINE

## 2019-10-04 PROCEDURE — 86200 CCP ANTIBODY: CPT | Performed by: FAMILY MEDICINE

## 2019-10-04 PROCEDURE — 82306 VITAMIN D 25 HYDROXY: CPT | Performed by: FAMILY MEDICINE

## 2019-10-04 PROCEDURE — 36415 COLL VENOUS BLD VENIPUNCTURE: CPT | Performed by: FAMILY MEDICINE

## 2019-10-04 PROCEDURE — 85652 RBC SED RATE AUTOMATED: CPT | Performed by: FAMILY MEDICINE

## 2019-11-07 ENCOUNTER — TELEPHONE (OUTPATIENT)
Dept: FAMILY MEDICINE CLINIC | Facility: CLINIC | Age: 64
End: 2019-11-07

## 2019-11-07 NOTE — TELEPHONE ENCOUNTER
patient c/o rapid heart rate has gone up to 120 last 1-2 min michael CP or SOB during this feels he has sinus infection.  Told patient OK to come in tomorrow unless HR stays up 5 min or he has chest pain / SOB then he will go to the hospital

## 2019-11-07 NOTE — TELEPHONE ENCOUNTER
Patient is experiencing rapid heart rate for last 2 weeks with low grade fever appt with tomorrow with  Dr. Zenovia Cooks at 2:30 pm

## 2019-11-08 ENCOUNTER — OFFICE VISIT (OUTPATIENT)
Dept: FAMILY MEDICINE CLINIC | Facility: CLINIC | Age: 64
End: 2019-11-08
Payer: COMMERCIAL

## 2019-11-08 VITALS
DIASTOLIC BLOOD PRESSURE: 78 MMHG | TEMPERATURE: 98 F | HEART RATE: 76 BPM | HEIGHT: 69 IN | BODY MASS INDEX: 31.4 KG/M2 | SYSTOLIC BLOOD PRESSURE: 124 MMHG | RESPIRATION RATE: 16 BRPM | WEIGHT: 212 LBS

## 2019-11-08 DIAGNOSIS — R00.2 PALPITATIONS: ICD-10-CM

## 2019-11-08 DIAGNOSIS — J01.00 ACUTE NON-RECURRENT MAXILLARY SINUSITIS: Primary | ICD-10-CM

## 2019-11-08 DIAGNOSIS — R07.89 CHEST WALL PAIN: ICD-10-CM

## 2019-11-08 PROCEDURE — 93000 ELECTROCARDIOGRAM COMPLETE: CPT | Performed by: FAMILY MEDICINE

## 2019-11-08 PROCEDURE — 99214 OFFICE O/P EST MOD 30 MIN: CPT | Performed by: FAMILY MEDICINE

## 2019-11-08 RX ORDER — AMOXICILLIN AND CLAVULANATE POTASSIUM 875; 125 MG/1; MG/1
1 TABLET, FILM COATED ORAL 2 TIMES DAILY
Qty: 20 TABLET | Refills: 0 | Status: SHIPPED | OUTPATIENT
Start: 2019-11-08 | End: 2019-11-18

## 2019-11-08 NOTE — PROGRESS NOTES
Patient presents with:  Nasal Congestion: x 2 weeks  Rapid Heart Beat: several episodes of rapid racing heart rate x 2 weeks      Subjective   HPI:   This is a 62year old male coming in for 2 weeks of nasal congestion, using sinus irrigation and helping g normal. Posterior oropharyngeal erythema present. No oropharyngeal exudate. Eyes: Conjunctivae are normal.   Neck: Normal range of motion. Neck supple. Cardiovascular: Normal rate, regular rhythm and normal heart sounds. No murmur heard.   Pulmonary/

## 2019-11-11 ENCOUNTER — HOSPITAL ENCOUNTER (OUTPATIENT)
Age: 64
Discharge: HOME | End: 2019-11-11
Payer: COMMERCIAL

## 2019-11-11 ENCOUNTER — APPOINTMENT (OUTPATIENT)
Dept: GENERAL RADIOLOGY | Age: 64
End: 2019-11-11
Attending: PHYSICIAN ASSISTANT
Payer: COMMERCIAL

## 2019-11-11 VITALS
TEMPERATURE: 98 F | RESPIRATION RATE: 16 BRPM | DIASTOLIC BLOOD PRESSURE: 86 MMHG | SYSTOLIC BLOOD PRESSURE: 128 MMHG | OXYGEN SATURATION: 96 % | HEART RATE: 87 BPM

## 2019-11-11 DIAGNOSIS — S20.211A RIB CONTUSION, RIGHT, INITIAL ENCOUNTER: Primary | ICD-10-CM

## 2019-11-11 PROCEDURE — 99213 OFFICE O/P EST LOW 20 MIN: CPT

## 2019-11-11 PROCEDURE — 71101 X-RAY EXAM UNILAT RIBS/CHEST: CPT | Performed by: PHYSICIAN ASSISTANT

## 2019-11-11 NOTE — ED PROVIDER NOTES
Patient Seen in: [facility]      History   Patient presents with:  Chest Pain    Stated Complaint: rib area pain    BRENDEN Benson is a 60-year-old male who presents today for evaluation of right-sided rib cage pain.   His past med by Jerald Cheney MD at [facility] MAIN OR   • COLONOSCOPY  12/15/2018   • COLONOSCOPY N/A 12/5/2016    Performed by Елена Cruz MD at [facility] ENDOSCOPY   • COLONOSCOPY & POLYPECTOMY  12/5/16    small polyp at prior polypectomy site    • FEMUR/KNEE SURG UNLIST is well-developed and normal weight. He is not ill-appearing. HENT:      Head: Normocephalic and atraumatic. Eyes:      Extraocular Movements: Extraocular movements intact. Pupils: Pupils are equal, round, and reactive to light.    Cardiovascular: x-ray findings. He is instructed on ibuprofen use. He is offered stronger pain medication, but declines, stating that he takes Ambien at nighttime and does not want to take a medication that would interact with it. This is appropriate.   I explained that

## 2019-11-11 NOTE — ED INITIAL ASSESSMENT (HPI)
Pt was getting into his truck when he slipped into the door this am injuring the rt side of his ribs

## 2019-11-15 ENCOUNTER — TELEPHONE (OUTPATIENT)
Dept: FAMILY MEDICINE CLINIC | Facility: CLINIC | Age: 64
End: 2019-11-15

## 2019-11-15 ENCOUNTER — OFFICE VISIT (OUTPATIENT)
Dept: FAMILY MEDICINE CLINIC | Facility: CLINIC | Age: 64
End: 2019-11-15
Payer: COMMERCIAL

## 2019-11-15 VITALS
DIASTOLIC BLOOD PRESSURE: 68 MMHG | HEART RATE: 79 BPM | RESPIRATION RATE: 15 BRPM | TEMPERATURE: 98 F | HEIGHT: 69 IN | BODY MASS INDEX: 31.4 KG/M2 | SYSTOLIC BLOOD PRESSURE: 122 MMHG | WEIGHT: 212 LBS

## 2019-11-15 DIAGNOSIS — J32.9 CHRONIC SINUSITIS, UNSPECIFIED LOCATION: Primary | ICD-10-CM

## 2019-11-15 DIAGNOSIS — R07.81 RIB PAIN ON RIGHT SIDE: ICD-10-CM

## 2019-11-15 DIAGNOSIS — S29.9XXD INJURY OF CHEST WALL, SUBSEQUENT ENCOUNTER: ICD-10-CM

## 2019-11-15 PROCEDURE — 99214 OFFICE O/P EST MOD 30 MIN: CPT | Performed by: PHYSICIAN ASSISTANT

## 2019-11-15 RX ORDER — DOXYCYCLINE HYCLATE 100 MG
100 TABLET ORAL 2 TIMES DAILY
Qty: 14 TABLET | Refills: 0 | Status: SHIPPED | OUTPATIENT
Start: 2019-11-15 | End: 2019-11-22

## 2019-11-15 NOTE — TELEPHONE ENCOUNTER
Pharmacist is calling from [facility] and she states that the script was written for tablets and the medication only comes in capsules but there is another one that comes in tablets which one is wanted. Antibiotic?

## 2019-11-18 ENCOUNTER — TELEPHONE (OUTPATIENT)
Dept: SURGERY | Facility: CLINIC | Age: 64
End: 2019-11-18

## 2019-11-18 NOTE — PROGRESS NOTES
Patient presents with: Injury: hit R side of chest on door frame on monday. feeling better        HISTORY OF PRESENT ILLNESS  Hannah Kaye is a 62year old male who presents for urgent care follow up.  He was seen in urgent care on 11/11 after slipping Apply 4 g topically 4 (four) times daily as needed (pain). , Disp: 500 g, Rfl: 1  •  Tamoxifen Citrate 20 MG Oral Tab, Take 1 tablet (20 mg total) by mouth once daily. , Disp: 30 tablet, Rfl: 10  •  atorvastatin 40 MG Oral Tab, Take 1 tablet (40 mg total) by Tonsillectomy         Social History    Tobacco Use      Smoking status: Never Smoker      Smokeless tobacco: Never Used    Alcohol use:  Yes      Alcohol/week: 2.0 - 3.0 standard drinks      Types: 2 - 3 Standard drinks or equivalent per week      Frequenc SENG Martinez

## 2020-01-06 ENCOUNTER — OFFICE VISIT (OUTPATIENT)
Dept: FAMILY MEDICINE CLINIC | Facility: CLINIC | Age: 65
End: 2020-01-06
Payer: COMMERCIAL

## 2020-01-06 VITALS
WEIGHT: 211.63 LBS | HEART RATE: 70 BPM | BODY MASS INDEX: 31.34 KG/M2 | TEMPERATURE: 98 F | SYSTOLIC BLOOD PRESSURE: 124 MMHG | RESPIRATION RATE: 15 BRPM | HEIGHT: 69 IN | DIASTOLIC BLOOD PRESSURE: 68 MMHG

## 2020-01-06 DIAGNOSIS — J32.9 CHRONIC RECURRENT SINUSITIS: Primary | ICD-10-CM

## 2020-01-06 PROCEDURE — 99213 OFFICE O/P EST LOW 20 MIN: CPT | Performed by: PHYSICIAN ASSISTANT

## 2020-01-06 RX ORDER — PREDNISONE 20 MG/1
TABLET ORAL
Qty: 10 TABLET | Refills: 0 | Status: SHIPPED | OUTPATIENT
Start: 2020-01-06 | End: 2020-01-14

## 2020-01-06 RX ORDER — AMOXICILLIN AND CLAVULANATE POTASSIUM 875; 125 MG/1; MG/1
1 TABLET, FILM COATED ORAL 2 TIMES DAILY
Qty: 20 TABLET | Refills: 0 | Status: SHIPPED | OUTPATIENT
Start: 2020-01-06 | End: 2020-01-16

## 2020-01-06 NOTE — PROGRESS NOTES
Patient presents with:  Sinus Problem: sinus infection since Nov. Had antibiotics twice. now it has gotten into the ear. Muffled and pain. crackling noise. Saline spray.         HISTORY OF PRESENT ILLNESS  Chikis Quinonez is a 62year old male who presents Allergies:  Levaquin [Levofloxacin]    Patient Active Problem List:     Prostate cancer ([facility] [address]     Pain in joint, ankle and foot     Mixed hyperlipidemia     Vitamin D deficiency     Insomnia     Hemangioma     Nephrolithiasis     Malignant neoplasm of distress. HEAD: Normocephalic, atraumatic. EYES: White conjunctiva, clear sclera. PERRL. EARS: External auditory canals clear bilaterally. No pain with manipulation of tragus or auricle. No mastoid tenderness or erythema.  Tympanic membranes clear bilate

## 2020-01-27 ENCOUNTER — OFFICE VISIT (OUTPATIENT)
Dept: FAMILY MEDICINE CLINIC | Facility: CLINIC | Age: 65
End: 2020-01-27
Payer: COMMERCIAL

## 2020-01-27 VITALS
BODY MASS INDEX: 31.4 KG/M2 | TEMPERATURE: 98 F | RESPIRATION RATE: 16 BRPM | DIASTOLIC BLOOD PRESSURE: 72 MMHG | SYSTOLIC BLOOD PRESSURE: 120 MMHG | HEART RATE: 81 BPM | WEIGHT: 212 LBS | HEIGHT: 69 IN

## 2020-01-27 DIAGNOSIS — H92.01 RIGHT EAR PAIN: Primary | ICD-10-CM

## 2020-01-27 DIAGNOSIS — J32.9 PURULENT POSTNASAL DRAINAGE: ICD-10-CM

## 2020-01-27 PROCEDURE — 90746 HEPB VACCINE 3 DOSE ADULT IM: CPT | Performed by: PHYSICIAN ASSISTANT

## 2020-01-27 PROCEDURE — 99213 OFFICE O/P EST LOW 20 MIN: CPT | Performed by: PHYSICIAN ASSISTANT

## 2020-01-27 PROCEDURE — 90471 IMMUNIZATION ADMIN: CPT | Performed by: PHYSICIAN ASSISTANT

## 2020-01-27 RX ORDER — FLUTICASONE PROPIONATE 50 MCG
1 SPRAY, SUSPENSION (ML) NASAL 2 TIMES DAILY
Qty: 1 BOTTLE | Refills: 0 | Status: SHIPPED | OUTPATIENT
Start: 2020-01-27 | End: 2020-08-12

## 2020-01-27 NOTE — PROGRESS NOTES
Patient presents with:  Ear Problem: R ear, warm feeling into the jaw are. Post Nasal Drip: on going, Sudafed TID x 1 week        HISTORY OF PRESENT ILLNESS  Janene Alfonso is a 62year old male who presents for evaluation of right ear pain.  I last saw malignant neoplasm of breast     PRICILA on CPAP     Thrombocytopenia (HCC)     ED (erectile dysfunction) of organic origin     PEDRO (stress urinary incontinence), male     Psoriasis     NAFLD (nonalcoholic fatty liver disease)      Past Surgical History:   Pro rhinorrhea. MOUTH/ THROAT: Moist mucous membranes. Posterior oropharynx clear without exudate or erythema. NECK: Supple without lymphadenopathy. CARDIOVASCULAR: Regular rate and rhythm, no murmurs/ rubs/ gallops. PULMONARY: Normal effort on room air.  C

## 2020-02-04 ENCOUNTER — HOSPITAL ENCOUNTER (OUTPATIENT)
Dept: ULTRASOUND IMAGING | Age: 65
Discharge: HOME | End: 2020-02-04
Attending: INTERNAL MEDICINE
Payer: COMMERCIAL

## 2020-02-04 DIAGNOSIS — K76.0 NAFLD (NONALCOHOLIC FATTY LIVER DISEASE): ICD-10-CM

## 2020-02-04 PROCEDURE — 76705 ECHO EXAM OF ABDOMEN: CPT | Performed by: INTERNAL MEDICINE

## 2020-02-04 PROCEDURE — 76981 USE PARENCHYMA: CPT | Performed by: INTERNAL MEDICINE

## 2020-02-05 ENCOUNTER — OFFICE VISIT (OUTPATIENT)
Dept: HEMATOLOGY/ONCOLOGY | Facility: HOSPITAL | Age: 65
End: 2020-02-05
Attending: INTERNAL MEDICINE
Payer: COMMERCIAL

## 2020-02-05 VITALS
TEMPERATURE: 98 F | OXYGEN SATURATION: 97 % | DIASTOLIC BLOOD PRESSURE: 85 MMHG | HEIGHT: 70 IN | SYSTOLIC BLOOD PRESSURE: 134 MMHG | WEIGHT: 212 LBS | BODY MASS INDEX: 30.35 KG/M2 | RESPIRATION RATE: 16 BRPM | HEART RATE: 79 BPM

## 2020-02-05 DIAGNOSIS — C61 PROSTATE CANCER (HCC): ICD-10-CM

## 2020-02-05 DIAGNOSIS — C50.121 MALIGNANT NEOPLASM OF CENTRAL PORTION OF RIGHT BREAST IN MALE, ESTROGEN RECEPTOR POSITIVE (HCC): Primary | ICD-10-CM

## 2020-02-05 DIAGNOSIS — Z17.0 MALIGNANT NEOPLASM OF CENTRAL PORTION OF RIGHT BREAST IN MALE, ESTROGEN RECEPTOR POSITIVE (HCC): Primary | ICD-10-CM

## 2020-02-05 DIAGNOSIS — D69.6 THROMBOCYTOPENIA (HCC): ICD-10-CM

## 2020-02-05 PROCEDURE — 99213 OFFICE O/P EST LOW 20 MIN: CPT | Performed by: INTERNAL MEDICINE

## 2020-02-05 NOTE — PROGRESS NOTES
Cancer Center Progress Note    Problem List:      Patient Active Problem List:     Prostate cancer ([facility] [address]     Pain in joint, ankle and foot     Mixed hyperlipidemia     Vitamin D deficiency     Insomnia     Hemangioma     Nephrolithiasis     Malignant neopl constipation and abdominal pain. Integument/breast: Negative for rash, skin lesions, and pruritus. Hematologic/lymphatic: Negative for easy bruising, and lymphadenopathy.   Genitourinary: Negative for dysuria or hematuria  Neurological: Negative for heada of the thumb   • LAPAROSCOPY, SURGICAL PROSTATECTOMY, RETROPUBIC RADICAL, W/NERVE SPARING  1/2015   • LEG/ANKLE SURGERY PROC UNLISTED  1978 1989    first left then right   • MASTECTOMY RIGHT  12/15/2015    SENTINEL AND AXILLARY LYMPH NODE REMOVAL   • OTHER total) by mouth once daily. , Disp: 90 tablet, Rfl: 3  Sildenafil Citrate (VIAGRA) 100 MG Oral Tab, Take 1 tablet (100 mg total) by mouth daily as needed. , Disp: 30 tablet, Rfl: 3  Cholecalciferol (VITAMIN D) 2000 units Oral Cap, Take by mouth daily.   , Dis 09/27/2019    ALT 30 09/27/2019    AST 26 09/27/2019    BILT 1.5 09/27/2019    ALB 3.9 09/27/2019    TP 6.7 09/27/2019       Radiologic imaging reviewed at this visit:    US of liver with elasticity yesterday:  FINDINGS:    LIVER:  Heterogeneous echogenici Risk Panel     Chronic thrombocytopenia:  Likely ITP     Recommend Yearly CBC      Nitin Addison MD

## 2020-02-19 ENCOUNTER — OFFICE VISIT (OUTPATIENT)
Dept: SURGERY | Facility: CLINIC | Age: 65
End: 2020-02-19
Payer: COMMERCIAL

## 2020-02-19 VITALS
SYSTOLIC BLOOD PRESSURE: 138 MMHG | WEIGHT: 215 LBS | OXYGEN SATURATION: 98 % | BODY MASS INDEX: 31 KG/M2 | HEART RATE: 81 BPM | RESPIRATION RATE: 18 BRPM | TEMPERATURE: 98 F | DIASTOLIC BLOOD PRESSURE: 92 MMHG

## 2020-02-19 DIAGNOSIS — K76.0 NAFLD (NONALCOHOLIC FATTY LIVER DISEASE): Primary | ICD-10-CM

## 2020-02-19 NOTE — PROGRESS NOTES
[facility]  [address], [address]  [address]  [phone] ([phone]) Squamous cell carcinoma 9/26/2013    in-situ, left upper arm; in-situ, left forearm   • Squamous cell carcinoma 8/22/2013    in-situ, right posterior neck; in-situ, right upper arm      Past Surgical History:   Procedure Laterality Date   • BREAST SENTINEL History    Tobacco Use      Smoking status: Never Smoker      Smokeless tobacco: Never Used    Alcohol use:  Yes      Alcohol/week: 2.0 - 3.0 standard drinks      Types: 2 - 3 Standard drinks or equivalent per week      Frequency: 2-3 times a week      Maranda continuing.   - Elastography (Feb '20) is reassuring  - Repeat elastography in 1 year  - HAV/HBV susceptible, undergoing vaccination  - Return in 1 year    Jeff Gibson MD  Director of Hepatology  Medical Director of [address] [place], [place] [address]

## 2020-03-22 DIAGNOSIS — F51.01 PRIMARY INSOMNIA: ICD-10-CM

## 2020-03-22 DIAGNOSIS — E78.2 MIXED HYPERLIPIDEMIA: ICD-10-CM

## 2020-03-23 RX ORDER — ATORVASTATIN CALCIUM 40 MG/1
40 TABLET, FILM COATED ORAL
Qty: 90 TABLET | Refills: 0 | Status: SHIPPED | OUTPATIENT
Start: 2020-03-23 | End: 2020-05-20

## 2020-03-23 RX ORDER — ZOLPIDEM TARTRATE 12.5 MG/1
TABLET, FILM COATED, EXTENDED RELEASE ORAL
Qty: 30 TABLET | Refills: 2 | Status: SHIPPED | OUTPATIENT
Start: 2020-03-23 | End: 2020-05-20

## 2020-03-23 NOTE — TELEPHONE ENCOUNTER
Refill request for:    Requested Prescriptions     Pending Prescriptions Disp Refills   • Zolpidem Tartrate ER 12.5 MG Oral Tab CR 30 tablet 5     Sig: TAKE ONE TABLET BY MOUTH NIGHTLY AS NEEDED FOR SLEEP     Signed Prescriptions Disp Refills   • atorvasta

## 2020-04-16 ENCOUNTER — VIRTUAL PHONE E/M (OUTPATIENT)
Dept: FAMILY MEDICINE CLINIC | Facility: CLINIC | Age: 65
End: 2020-04-16
Payer: COMMERCIAL

## 2020-04-16 DIAGNOSIS — E55.9 VITAMIN D DEFICIENCY: ICD-10-CM

## 2020-04-16 DIAGNOSIS — Z00.00 LABORATORY EXAMINATION ORDERED AS PART OF A ROUTINE GENERAL MEDICAL EXAMINATION: ICD-10-CM

## 2020-04-16 DIAGNOSIS — K76.0 NAFLD (NONALCOHOLIC FATTY LIVER DISEASE): ICD-10-CM

## 2020-04-16 DIAGNOSIS — F51.01 PRIMARY INSOMNIA: ICD-10-CM

## 2020-04-16 DIAGNOSIS — D69.6 THROMBOCYTOPENIA (HCC): ICD-10-CM

## 2020-04-16 DIAGNOSIS — E78.2 MIXED HYPERLIPIDEMIA: Primary | ICD-10-CM

## 2020-04-16 PROCEDURE — 99214 OFFICE O/P EST MOD 30 MIN: CPT | Performed by: FAMILY MEDICINE

## 2020-04-16 NOTE — PROGRESS NOTES
Virtual/Telephone Check-In    Margareth Sanchez verbally consents to a Virtual/Telephone Check-In service on 4/16/2020. Patient understands and accepts financial responsibility for any deductible, co-insurance and/or co-pays associated with this service.

## 2020-04-19 DIAGNOSIS — C61 PROSTATE CANCER (HCC): ICD-10-CM

## 2020-04-20 RX ORDER — TAMOXIFEN CITRATE 20 MG/1
20 TABLET ORAL
Qty: 30 TABLET | Refills: 3 | Status: SHIPPED | OUTPATIENT
Start: 2020-04-20 | End: 2020-08-31

## 2020-05-12 ENCOUNTER — TELEPHONE (OUTPATIENT)
Dept: HEMATOLOGY/ONCOLOGY | Facility: HOSPITAL | Age: 65
End: 2020-05-12

## 2020-05-20 ENCOUNTER — VIRTUAL PHONE E/M (OUTPATIENT)
Dept: FAMILY MEDICINE CLINIC | Facility: CLINIC | Age: 65
End: 2020-05-20

## 2020-05-20 ENCOUNTER — TELEPHONE (OUTPATIENT)
Dept: FAMILY MEDICINE CLINIC | Facility: CLINIC | Age: 65
End: 2020-05-20

## 2020-05-20 DIAGNOSIS — N39.0 RECURRENT UTI: Primary | ICD-10-CM

## 2020-05-20 DIAGNOSIS — F51.01 PRIMARY INSOMNIA: ICD-10-CM

## 2020-05-20 DIAGNOSIS — E78.2 MIXED HYPERLIPIDEMIA: ICD-10-CM

## 2020-05-20 DIAGNOSIS — C61 PROSTATE CANCER (HCC): ICD-10-CM

## 2020-05-20 DIAGNOSIS — N39.3 SUI (STRESS URINARY INCONTINENCE), MALE: ICD-10-CM

## 2020-05-20 PROCEDURE — 99213 OFFICE O/P EST LOW 20 MIN: CPT | Performed by: FAMILY MEDICINE

## 2020-05-20 RX ORDER — ATORVASTATIN CALCIUM 40 MG/1
40 TABLET, FILM COATED ORAL
Qty: 30 TABLET | Refills: 11 | Status: SHIPPED | OUTPATIENT
Start: 2020-05-20 | End: 2021-07-16

## 2020-05-20 RX ORDER — SULFAMETHOXAZOLE AND TRIMETHOPRIM 800; 160 MG/1; MG/1
1 TABLET ORAL 2 TIMES DAILY
Qty: 20 TABLET | Refills: 0 | Status: SHIPPED | OUTPATIENT
Start: 2020-05-20 | End: 2020-05-28 | Stop reason: ALTCHOICE

## 2020-05-20 RX ORDER — SULFAMETHOXAZOLE AND TRIMETHOPRIM 800; 160 MG/1; MG/1
1 TABLET ORAL 2 TIMES DAILY
Refills: 0 | Status: CANCELLED | OUTPATIENT
Start: 2020-05-20

## 2020-05-20 RX ORDER — ZOLPIDEM TARTRATE 12.5 MG/1
TABLET, FILM COATED, EXTENDED RELEASE ORAL
Qty: 30 TABLET | Refills: 5 | Status: SHIPPED | OUTPATIENT
Start: 2020-05-20 | End: 2020-11-30

## 2020-05-20 NOTE — TELEPHONE ENCOUNTER
Patient c/o felt urge to void urine cloudily with blood at end of stream denies fever has cloudy urine with blood and pain. He does not want to go out to give culture due to virus.

## 2020-05-20 NOTE — PROGRESS NOTES
Virtual/Telephone Check-In    Maria Teresa Parr verbally consents to a Virtual/Telephone Check-In service on 05/20/20. Patient has been referred to the [facility] website at [email or web address] to review the yearly Consent to Treat document.   Patient underst Zolpidem Tartrate ER 12.5 MG Oral Tab CR       Genitourinary    Prostate cancer ([facility] [address]    Overview     davinci prostatectomy at [facility] 1/2015, mild incontinence since surgery         PEDRO (stress urinary incontinence), male      Other Visit Diagnoses

## 2020-07-16 ENCOUNTER — TELEPHONE (OUTPATIENT)
Dept: SURGERY | Facility: CLINIC | Age: 65
End: 2020-07-16

## 2020-07-16 DIAGNOSIS — Z85.3 HISTORY OF RIGHT BREAST CANCER: Primary | ICD-10-CM

## 2020-07-16 DIAGNOSIS — Z12.39 SCREENING FOR BREAST CANCER: ICD-10-CM

## 2020-07-31 ENCOUNTER — OFFICE VISIT (OUTPATIENT)
Dept: SURGERY | Facility: CLINIC | Age: 65
End: 2020-07-31
Payer: COMMERCIAL

## 2020-07-31 VITALS
SYSTOLIC BLOOD PRESSURE: 150 MMHG | HEART RATE: 73 BPM | WEIGHT: 211.63 LBS | HEIGHT: 70 IN | DIASTOLIC BLOOD PRESSURE: 89 MMHG | OXYGEN SATURATION: 95 % | BODY MASS INDEX: 30.3 KG/M2 | RESPIRATION RATE: 16 BRPM

## 2020-07-31 DIAGNOSIS — Z12.39 SCREENING FOR BREAST CANCER: ICD-10-CM

## 2020-07-31 DIAGNOSIS — Z85.3 HISTORY OF RIGHT BREAST CANCER: ICD-10-CM

## 2020-07-31 DIAGNOSIS — Z17.0 MALIGNANT NEOPLASM OF CENTRAL PORTION OF RIGHT BREAST IN MALE, ESTROGEN RECEPTOR POSITIVE (HCC): Primary | ICD-10-CM

## 2020-07-31 DIAGNOSIS — C50.121 MALIGNANT NEOPLASM OF CENTRAL PORTION OF RIGHT BREAST IN MALE, ESTROGEN RECEPTOR POSITIVE (HCC): Primary | ICD-10-CM

## 2020-07-31 PROCEDURE — 99214 OFFICE O/P EST MOD 30 MIN: CPT | Performed by: SURGERY

## 2020-07-31 PROCEDURE — 3008F BODY MASS INDEX DOCD: CPT | Performed by: SURGERY

## 2020-07-31 PROCEDURE — 3079F DIAST BP 80-89 MM HG: CPT | Performed by: SURGERY

## 2020-07-31 PROCEDURE — 3077F SYST BP >= 140 MM HG: CPT | Performed by: SURGERY

## 2020-07-31 NOTE — PROGRESS NOTES
Breast Surgery Follow-Up Visit    Diagnosis: Infiltrating Ductal Carcinoma, right breast; ER positive, [place] positive status post modified radical mastectomy without reconstruction on December 15, 2015.     Stage: [id number] (Stage IB)    Disease Status:  Earle Morton Hyperlipidemia    • Personal history of arthritis    • Prostate cancer ([facility] [address] 1/2015    [facility]   • Squamous cell carcinoma 2/4/2016    in-situ, mid upper forehead   • Squamous cell carcinoma 4/8/2014    in-situ, mid upper chest   • Squamous c Application topically 2 (two) times daily. , Disp: 80 g, Rfl: 1  Diclofenac Sodium (VOLTAREN) 1 % Transdermal Gel, Apply 4 g topically 4 (four) times daily as needed (pain). , Disp: 500 g, Rfl: 1  Sildenafil Citrate (VIAGRA) 100 MG Oral Tab, Take 1 tablet (1 patient denies eye irritation, cataracts, redness, glaucoma, yellowing of the eyes, change in vision or color blindness.  The patient denies +hearing loss, ringing in the ears, ear drainage, earaches, nasal congestion, nose bleeds, snoring, pain in mouth/th problems walking, weakness, tingling or burning in hands/feet. There is no history of abusive relationship, bipolar disorder, sleep disturbance, anxiety, depression or feeling of despair. Endocrine:     There is no history of poor/slow wound healing, yani patient's preference. He will return to see me in one year for surveillance. He was encouraged to contact the office with any questions or concerns prior to his next scheduled appointment.      This encounter lasted a total of 25 minutes, more than 50% of w

## 2020-08-03 ENCOUNTER — TELEPHONE (OUTPATIENT)
Dept: FAMILY MEDICINE CLINIC | Facility: CLINIC | Age: 65
End: 2020-08-03

## 2020-08-03 ENCOUNTER — VIRTUAL PHONE E/M (OUTPATIENT)
Dept: FAMILY MEDICINE CLINIC | Facility: CLINIC | Age: 65
End: 2020-08-03
Payer: COMMERCIAL

## 2020-08-03 DIAGNOSIS — M79.672 LEFT FOOT PAIN: Primary | ICD-10-CM

## 2020-08-03 DIAGNOSIS — F51.01 PRIMARY INSOMNIA: ICD-10-CM

## 2020-08-03 DIAGNOSIS — D69.6 THROMBOCYTOPENIA (HCC): ICD-10-CM

## 2020-08-03 PROCEDURE — 99213 OFFICE O/P EST LOW 20 MIN: CPT | Performed by: FAMILY MEDICINE

## 2020-08-03 RX ORDER — COLCHICINE 0.6 MG/1
0.6 TABLET ORAL 2 TIMES DAILY PRN
Qty: 60 TABLET | Refills: 0 | Status: SHIPPED | OUTPATIENT
Start: 2020-08-03 | End: 2020-10-07

## 2020-08-03 NOTE — TELEPHONE ENCOUNTER
Patient called complaints of joint pain. Right knee, ankles, and both shoulders. Patient has not been able to sleep. Patient states he takes [place] to sleep and medication and is working due to the pain.  Patient has appointment with Dr. Zaire Matthews for Friday can

## 2020-08-03 NOTE — TELEPHONE ENCOUNTER
Called and talked to patient he is having joint pain in ankles left great toe very painful, and both wrist unknown if swollen has lymph edema and wears a sleeve. He has had some of the pain for several weeks but it got worse after mowing the lawn.  He is wo

## 2020-08-03 NOTE — PROGRESS NOTES
Patient presents with:  Pain      Subjective   HPI:   This is a 62year old male coming in for pain, could not sleep with pain , shooting from left foot near big toe, throbbing sharp pain, all night long. Building slowly for 2 weeks.  advil 800 TID with pravin ([facility]    Overview      since 2014, sees Dr Regla Mehta already           Other Visit Diagnoses     Left foot pain    -  Primary    Relevant Medications    colchicine 0.6 MG Oral Tab    Other Relevant Orders    URIC ACID, SERUM    SED RATE, Kate Blight (AUTO

## 2020-08-05 ENCOUNTER — APPOINTMENT (OUTPATIENT)
Dept: HEMATOLOGY/ONCOLOGY | Facility: HOSPITAL | Age: 65
End: 2020-08-05
Payer: COMMERCIAL

## 2020-08-05 ENCOUNTER — APPOINTMENT (OUTPATIENT)
Dept: LAB | Facility: HOSPITAL | Age: 65
End: 2020-08-05
Attending: FAMILY MEDICINE
Payer: COMMERCIAL

## 2020-08-05 DIAGNOSIS — D69.6 THROMBOCYTOPENIA (HCC): ICD-10-CM

## 2020-08-05 DIAGNOSIS — Z00.00 LABORATORY EXAMINATION ORDERED AS PART OF A ROUTINE GENERAL MEDICAL EXAMINATION: ICD-10-CM

## 2020-08-05 DIAGNOSIS — M79.672 LEFT FOOT PAIN: ICD-10-CM

## 2020-08-05 DIAGNOSIS — E55.9 VITAMIN D DEFICIENCY: ICD-10-CM

## 2020-08-05 DIAGNOSIS — E78.2 MIXED HYPERLIPIDEMIA: ICD-10-CM

## 2020-08-05 LAB
ALBUMIN SERPL-MCNC: 4 G/DL (ref 3.4–5)
ALBUMIN/GLOB SERPL: 1.4 {RATIO} (ref 1–2)
ALP LIVER SERPL-CCNC: 51 U/L (ref 45–117)
ALT SERPL-CCNC: 35 U/L (ref 16–61)
ANION GAP SERPL CALC-SCNC: 3 MMOL/L (ref 0–18)
AST SERPL-CCNC: 28 U/L (ref 15–37)
BILIRUB SERPL-MCNC: 1.4 MG/DL (ref 0.1–2)
BUN BLD-MCNC: 15 MG/DL (ref 7–18)
BUN/CREAT SERPL: 14.4 (ref 10–20)
CALCIUM BLD-MCNC: 8.8 MG/DL (ref 8.5–10.1)
CHLORIDE SERPL-SCNC: 108 MMOL/L (ref 98–112)
CHOLEST SMN-MCNC: 109 MG/DL (ref ?–200)
CO2 SERPL-SCNC: 28 MMOL/L (ref 21–32)
CREAT BLD-MCNC: 1.04 MG/DL (ref 0.7–1.3)
CRP SERPL HS-MCNC: <0.16 MG/L (ref ?–3)
DEPRECATED RDW RBC AUTO: 42.8 FL (ref 35.1–46.3)
ERYTHROCYTE [DISTWIDTH] IN BLOOD BY AUTOMATED COUNT: 12.6 % (ref 11–15)
GLOBULIN PLAS-MCNC: 2.8 G/DL (ref 2.8–4.4)
GLUCOSE BLD-MCNC: 99 MG/DL (ref 70–99)
HCT VFR BLD AUTO: 43 % (ref 39–53)
HDLC SERPL-MCNC: 33 MG/DL (ref 40–59)
HGB BLD-MCNC: 14.4 G/DL (ref 13–17.5)
LDLC SERPL CALC-MCNC: 52 MG/DL (ref ?–100)
M PROTEIN MFR SERPL ELPH: 6.8 G/DL (ref 6.4–8.2)
MCH RBC QN AUTO: 31.2 PG (ref 26–34)
MCHC RBC AUTO-ENTMCNC: 33.5 G/DL (ref 31–37)
MCV RBC AUTO: 93.1 FL (ref 80–100)
NONHDLC SERPL-MCNC: 76 MG/DL (ref ?–130)
OSMOLALITY SERPL CALC.SUM OF ELEC: 289 MOSM/KG (ref 275–295)
PATIENT FASTING Y/N/NP: YES
PATIENT FASTING Y/N/NP: YES
PLATELET # BLD AUTO: 127 10(3)UL (ref 150–450)
POTASSIUM SERPL-SCNC: 4.2 MMOL/L (ref 3.5–5.1)
RBC # BLD AUTO: 4.62 X10(6)UL (ref 4.3–5.7)
SED RATE-ML: 6 MM/HR (ref 0–12)
SODIUM SERPL-SCNC: 139 MMOL/L (ref 136–145)
TRIGL SERPL-MCNC: 120 MG/DL (ref 30–149)
URATE SERPL-MCNC: 7.1 MG/DL (ref 3.5–7.2)
VIT D+METAB SERPL-MCNC: 23.1 NG/ML (ref 30–100)
VLDLC SERPL CALC-MCNC: 24 MG/DL (ref 0–30)
WBC # BLD AUTO: 4.1 X10(3) UL (ref 4–11)

## 2020-08-05 PROCEDURE — 85652 RBC SED RATE AUTOMATED: CPT

## 2020-08-05 PROCEDURE — 80061 LIPID PANEL: CPT

## 2020-08-05 PROCEDURE — 86141 C-REACTIVE PROTEIN HS: CPT

## 2020-08-05 PROCEDURE — 82306 VITAMIN D 25 HYDROXY: CPT

## 2020-08-05 PROCEDURE — 80053 COMPREHEN METABOLIC PANEL: CPT

## 2020-08-05 PROCEDURE — 36415 COLL VENOUS BLD VENIPUNCTURE: CPT

## 2020-08-05 PROCEDURE — 85027 COMPLETE CBC AUTOMATED: CPT

## 2020-08-05 PROCEDURE — 84550 ASSAY OF BLOOD/URIC ACID: CPT

## 2020-08-07 ENCOUNTER — HOSPITAL ENCOUNTER (OUTPATIENT)
Dept: GENERAL RADIOLOGY | Age: 65
Discharge: HOME | End: 2020-08-07
Attending: FAMILY MEDICINE
Payer: COMMERCIAL

## 2020-08-07 ENCOUNTER — OFFICE VISIT (OUTPATIENT)
Dept: FAMILY MEDICINE CLINIC | Facility: CLINIC | Age: 65
End: 2020-08-07
Payer: COMMERCIAL

## 2020-08-07 VITALS
RESPIRATION RATE: 17 BRPM | SYSTOLIC BLOOD PRESSURE: 118 MMHG | DIASTOLIC BLOOD PRESSURE: 68 MMHG | BODY MASS INDEX: 30.26 KG/M2 | TEMPERATURE: 98 F | HEART RATE: 76 BPM | HEIGHT: 70 IN | WEIGHT: 211.38 LBS

## 2020-08-07 DIAGNOSIS — F51.01 PRIMARY INSOMNIA: ICD-10-CM

## 2020-08-07 DIAGNOSIS — M25.562 ACUTE PAIN OF LEFT KNEE: ICD-10-CM

## 2020-08-07 DIAGNOSIS — D69.6 THROMBOCYTOPENIA (HCC): ICD-10-CM

## 2020-08-07 DIAGNOSIS — E78.2 MIXED HYPERLIPIDEMIA: ICD-10-CM

## 2020-08-07 DIAGNOSIS — M25.50 ARTHRALGIA, UNSPECIFIED JOINT: ICD-10-CM

## 2020-08-07 DIAGNOSIS — M25.50 ARTHRALGIA, UNSPECIFIED JOINT: Primary | ICD-10-CM

## 2020-08-07 PROCEDURE — 73610 X-RAY EXAM OF ANKLE: CPT | Performed by: FAMILY MEDICINE

## 2020-08-07 PROCEDURE — 3078F DIAST BP <80 MM HG: CPT | Performed by: FAMILY MEDICINE

## 2020-08-07 PROCEDURE — 99214 OFFICE O/P EST MOD 30 MIN: CPT | Performed by: FAMILY MEDICINE

## 2020-08-07 PROCEDURE — 90471 IMMUNIZATION ADMIN: CPT | Performed by: FAMILY MEDICINE

## 2020-08-07 PROCEDURE — 3074F SYST BP LT 130 MM HG: CPT | Performed by: FAMILY MEDICINE

## 2020-08-07 PROCEDURE — 90746 HEPB VACCINE 3 DOSE ADULT IM: CPT | Performed by: FAMILY MEDICINE

## 2020-08-07 PROCEDURE — 73562 X-RAY EXAM OF KNEE 3: CPT | Performed by: FAMILY MEDICINE

## 2020-08-07 PROCEDURE — 3008F BODY MASS INDEX DOCD: CPT | Performed by: FAMILY MEDICINE

## 2020-08-07 PROCEDURE — 73630 X-RAY EXAM OF FOOT: CPT | Performed by: FAMILY MEDICINE

## 2020-08-07 RX ORDER — PREDNISONE 10 MG/1
TABLET ORAL
Qty: 40 TABLET | Refills: 0 | Status: SHIPPED | OUTPATIENT
Start: 2020-08-07 | End: 2020-08-21 | Stop reason: ALTCHOICE

## 2020-08-07 NOTE — PROGRESS NOTES
Patient presents with:  Joint Pain: swollen joints, x 1 month . havinig trouble sleeping due to pain       Subjective   HPI:   This is a 62year old male coming in for joints huritnig, left knee is new yestrreday but all over. toruble sleeing with this. place, and time. He has normal strength. Skin: Skin is warm and dry. No rash noted. Psychiatric: He has a normal mood and affect.  His speech is normal and behavior is normal. Judgment and thought content normal. Cognition and memory are normal.

## 2020-08-12 ENCOUNTER — OFFICE VISIT (OUTPATIENT)
Dept: HEMATOLOGY/ONCOLOGY | Facility: HOSPITAL | Age: 65
End: 2020-08-12
Attending: INTERNAL MEDICINE
Payer: COMMERCIAL

## 2020-08-12 VITALS
OXYGEN SATURATION: 95 % | WEIGHT: 208 LBS | HEART RATE: 70 BPM | BODY MASS INDEX: 29.78 KG/M2 | SYSTOLIC BLOOD PRESSURE: 156 MMHG | HEIGHT: 70 IN | RESPIRATION RATE: 16 BRPM | TEMPERATURE: 99 F | DIASTOLIC BLOOD PRESSURE: 90 MMHG

## 2020-08-12 DIAGNOSIS — C61 PROSTATE CANCER (HCC): ICD-10-CM

## 2020-08-12 DIAGNOSIS — C50.121 MALIGNANT NEOPLASM OF CENTRAL PORTION OF RIGHT BREAST IN MALE, ESTROGEN RECEPTOR POSITIVE (HCC): Primary | ICD-10-CM

## 2020-08-12 DIAGNOSIS — D69.6 THROMBOCYTOPENIA (HCC): ICD-10-CM

## 2020-08-12 DIAGNOSIS — Z17.0 MALIGNANT NEOPLASM OF CENTRAL PORTION OF RIGHT BREAST IN MALE, ESTROGEN RECEPTOR POSITIVE (HCC): Primary | ICD-10-CM

## 2020-08-12 PROCEDURE — 99213 OFFICE O/P EST LOW 20 MIN: CPT | Performed by: INTERNAL MEDICINE

## 2020-08-12 NOTE — PROGRESS NOTES
Cancer Center Progress Note    Problem List:      Patient Active Problem List:     Prostate cancer ([facility] [address]     Pain in joint, ankle and foot     Mixed hyperlipidemia     Vitamin D deficiency     Insomnia     Hemangioma     Nephrolithiasis     Malignant neopl constipation and abdominal pain. Integument/breast: Negative for rash, skin lesions, and pruritus. Hematologic/lymphatic: Negative for easy bruising, and lymphadenopathy.   Genitourinary: Negative for dysuria or hematuria  Neurological: Negative for heada of the thumb   • LAPAROSCOPY, SURGICAL PROSTATECTOMY, RETROPUBIC RADICAL, W/NERVE SPARING  1/2015   • LEG/ANKLE SURGERY PROC UNLISTED  1978 1989    first left then right   • MASTECTOMY RIGHT  12/15/2015    SENTINEL AND AXILLARY LYMPH NODE REMOVAL   • OTHER Physical Examination:    Constitutional: Patient is alert and oriented x 3, not in acute distress. Eyes: Anicteric sclera, pink conjunctiva. HEENT:  Oropharynx is clear. Neck is supple. Respiratory: Clear to auscultation and percussion. No rales.   Westly Gathers yesterday:  FINDINGS:    LIVER:  Heterogeneous echogenicity of the liver is noted. Common bile duct measures 5 mm and is nondilated. Gallbladder is unremarkable. Focal fatty sparing adjacent to the gallbladder is noted.

## 2020-08-19 ENCOUNTER — HOSPITAL ENCOUNTER (EMERGENCY)
Facility: HOSPITAL | Age: 65
Discharge: HOME | End: 2020-08-19
Attending: EMERGENCY MEDICINE
Payer: COMMERCIAL

## 2020-08-19 VITALS
SYSTOLIC BLOOD PRESSURE: 147 MMHG | TEMPERATURE: 98 F | BODY MASS INDEX: 29.49 KG/M2 | OXYGEN SATURATION: 95 % | HEART RATE: 84 BPM | WEIGHT: 206 LBS | DIASTOLIC BLOOD PRESSURE: 75 MMHG | HEIGHT: 70 IN | RESPIRATION RATE: 18 BRPM

## 2020-08-19 DIAGNOSIS — M71.10 SEPTIC BURSITIS: Primary | ICD-10-CM

## 2020-08-19 LAB
ALBUMIN SERPL-MCNC: 3.9 G/DL (ref 3.4–5)
ALBUMIN/GLOB SERPL: 1.2 {RATIO} (ref 1–2)
ALP LIVER SERPL-CCNC: 60 U/L (ref 45–117)
ALT SERPL-CCNC: 45 U/L (ref 16–61)
ANION GAP SERPL CALC-SCNC: 7 MMOL/L (ref 0–18)
AST SERPL-CCNC: 26 U/L (ref 15–37)
BASOPHILS # BLD AUTO: 0.03 X10(3) UL (ref 0–0.2)
BASOPHILS NFR BLD AUTO: 0.3 %
BILIRUB SERPL-MCNC: 2 MG/DL (ref 0.1–2)
BUN BLD-MCNC: 15 MG/DL (ref 7–18)
BUN/CREAT SERPL: 10.9 (ref 10–20)
CALCIUM BLD-MCNC: 8.8 MG/DL (ref 8.5–10.1)
CHLORIDE SERPL-SCNC: 105 MMOL/L (ref 98–112)
CO2 SERPL-SCNC: 26 MMOL/L (ref 21–32)
CREAT BLD-MCNC: 1.37 MG/DL (ref 0.7–1.3)
DEPRECATED RDW RBC AUTO: 43.6 FL (ref 35.1–46.3)
EOSINOPHIL # BLD AUTO: 0.01 X10(3) UL (ref 0–0.7)
EOSINOPHIL NFR BLD AUTO: 0.1 %
ERYTHROCYTE [DISTWIDTH] IN BLOOD BY AUTOMATED COUNT: 13.4 % (ref 11–15)
GLOBULIN PLAS-MCNC: 3.2 G/DL (ref 2.8–4.4)
GLUCOSE BLD-MCNC: 179 MG/DL (ref 70–99)
HCT VFR BLD AUTO: 44.7 % (ref 39–53)
HGB BLD-MCNC: 15.7 G/DL (ref 13–17.5)
IMM GRANULOCYTES # BLD AUTO: 0.16 X10(3) UL (ref 0–1)
IMM GRANULOCYTES NFR BLD: 1.8 %
LYMPHOCYTES # BLD AUTO: 0.62 X10(3) UL (ref 1–4)
LYMPHOCYTES NFR BLD AUTO: 6.9 %
M PROTEIN MFR SERPL ELPH: 7.1 G/DL (ref 6.4–8.2)
MCH RBC QN AUTO: 31.8 PG (ref 26–34)
MCHC RBC AUTO-ENTMCNC: 35.1 G/DL (ref 31–37)
MCV RBC AUTO: 90.5 FL (ref 80–100)
MONOCYTES # BLD AUTO: 0.49 X10(3) UL (ref 0.1–1)
MONOCYTES NFR BLD AUTO: 5.4 %
NEUTROPHILS # BLD AUTO: 7.73 X10 (3) UL (ref 1.5–7.7)
NEUTROPHILS # BLD AUTO: 7.73 X10(3) UL (ref 1.5–7.7)
NEUTROPHILS NFR BLD AUTO: 85.5 %
OSMOLALITY SERPL CALC.SUM OF ELEC: 291 MOSM/KG (ref 275–295)
PLATELET # BLD AUTO: 158 10(3)UL (ref 150–450)
POTASSIUM SERPL-SCNC: 3.8 MMOL/L (ref 3.5–5.1)
RBC # BLD AUTO: 4.94 X10(6)UL (ref 4.3–5.7)
SED RATE-ML: 8 MM/HR (ref 0–12)
SODIUM SERPL-SCNC: 138 MMOL/L (ref 136–145)
WBC # BLD AUTO: 9 X10(3) UL (ref 4–11)

## 2020-08-19 PROCEDURE — 85025 COMPLETE CBC W/AUTO DIFF WBC: CPT | Performed by: EMERGENCY MEDICINE

## 2020-08-19 PROCEDURE — 36415 COLL VENOUS BLD VENIPUNCTURE: CPT

## 2020-08-19 PROCEDURE — 99284 EMERGENCY DEPT VISIT MOD MDM: CPT

## 2020-08-19 PROCEDURE — 87040 BLOOD CULTURE FOR BACTERIA: CPT | Performed by: EMERGENCY MEDICINE

## 2020-08-19 PROCEDURE — 96374 THER/PROPH/DIAG INJ IV PUSH: CPT

## 2020-08-19 PROCEDURE — 85652 RBC SED RATE AUTOMATED: CPT | Performed by: EMERGENCY MEDICINE

## 2020-08-19 PROCEDURE — 80053 COMPREHEN METABOLIC PANEL: CPT | Performed by: EMERGENCY MEDICINE

## 2020-08-19 RX ORDER — CEFAZOLIN SODIUM/WATER 2 G/20 ML
2 SYRINGE (ML) INTRAVENOUS ONCE
Status: COMPLETED | OUTPATIENT
Start: 2020-08-19 | End: 2020-08-19

## 2020-08-19 NOTE — ED PROVIDER NOTES
Patient Seen in: [facility] Emergency Department      History   Patient presents with:  Cellulitis    Stated Complaint: cellulits     HPI    Patient is a 61-year-old male who presents with left knee pain.   Patient says is been worsening over the las • FEMUR/KNEE SURG UNLISTED      right knee   • FOOT/TOES SURGERY PROC UNLISTED  12/2012    Tarsil fusion, 8 screws and 21 plate   • HAND/FINGER SURGERY UNLISTED      surgery of the thumb   • LAPAROSCOPY, SURGICAL PROSTATECTOMY, RETROPUBIC RADICAL, W/NERVE Abdomen: Soft and nontender throughout. No rebound or guarding  Extremities: Erythema of the skin over the left knee. Pain with range of motion of that knee. Skin: No rashes, no pallor  Neuro: Awake oriented ×3.   Nonfocal.  Good strength throughout    ED Discharge  8/19/2020  3:20 pm    Follow-up:  [facility]    In 2 days            Medications Prescribed:  Discharge Medication List as of 8/19/2020  3:28 PM

## 2020-08-20 ENCOUNTER — HOSPITAL ENCOUNTER (OUTPATIENT)
Dept: MAMMOGRAPHY | Age: 65
Discharge: HOME | End: 2020-08-20
Attending: SURGERY
Payer: COMMERCIAL

## 2020-08-20 ENCOUNTER — TELEPHONE (OUTPATIENT)
Dept: FAMILY MEDICINE CLINIC | Facility: CLINIC | Age: 65
End: 2020-08-20

## 2020-08-20 DIAGNOSIS — Z12.39 SCREENING FOR BREAST CANCER: ICD-10-CM

## 2020-08-20 DIAGNOSIS — Z85.3 HISTORY OF RIGHT BREAST CANCER: ICD-10-CM

## 2020-08-20 PROCEDURE — 77061 BREAST TOMOSYNTHESIS UNI: CPT | Performed by: SURGERY

## 2020-08-20 PROCEDURE — 77065 DX MAMMO INCL CAD UNI: CPT | Performed by: SURGERY

## 2020-08-20 NOTE — TELEPHONE ENCOUNTER
Patient saw the orthopaedic for his knee and he told him he had Sceptic Bercitis of the Left knee.     He went to ER for the pain and they did blood work and put him on the antibiotic for 3 weeks and told him to call Infectious disease he can't get in until

## 2020-08-21 ENCOUNTER — OFFICE VISIT (OUTPATIENT)
Dept: FAMILY MEDICINE CLINIC | Facility: CLINIC | Age: 65
End: 2020-08-21
Payer: COMMERCIAL

## 2020-08-21 VITALS
DIASTOLIC BLOOD PRESSURE: 62 MMHG | WEIGHT: 212 LBS | RESPIRATION RATE: 17 BRPM | HEIGHT: 70 IN | BODY MASS INDEX: 30.35 KG/M2 | SYSTOLIC BLOOD PRESSURE: 114 MMHG | HEART RATE: 76 BPM | TEMPERATURE: 98 F

## 2020-08-21 DIAGNOSIS — M71.162 SEPTIC PREPATELLAR BURSITIS OF LEFT KNEE: Primary | ICD-10-CM

## 2020-08-21 PROCEDURE — 3074F SYST BP LT 130 MM HG: CPT | Performed by: FAMILY MEDICINE

## 2020-08-21 PROCEDURE — 3078F DIAST BP <80 MM HG: CPT | Performed by: FAMILY MEDICINE

## 2020-08-21 PROCEDURE — 99213 OFFICE O/P EST LOW 20 MIN: CPT | Performed by: FAMILY MEDICINE

## 2020-08-21 PROCEDURE — 3008F BODY MASS INDEX DOCD: CPT | Performed by: FAMILY MEDICINE

## 2020-08-21 RX ORDER — CLINDAMYCIN HYDROCHLORIDE 300 MG/1
1 CAPSULE ORAL 3 TIMES DAILY
COMMUNITY
Start: 2020-08-19 | End: 2020-10-07

## 2020-08-21 RX ORDER — SULFAMETHOXAZOLE AND TRIMETHOPRIM 800; 160 MG/1; MG/1
1 TABLET ORAL 2 TIMES DAILY
Qty: 20 TABLET | Refills: 0 | Status: SHIPPED | OUTPATIENT
Start: 2020-08-21 | End: 2020-08-31

## 2020-08-21 NOTE — PROGRESS NOTES
Patient presents with:  Rash: L knee , august 8th. Dx: bursitis  ER 8/19 . now a rash under both armpits       Subjective   HPI:   This is a 62year old male coming in for redness knee, no new issues since ER visit.  Sx stated at last visit and more severe Problem List Items Addressed This Visit     None      Visit Diagnoses     Septic prepatellar bursitis of left knee    -  Primary    Relevant Medications    Clindamycin HCl 300 MG Oral Cap    Sulfamethoxazole-TMP -160 MG Oral Tab per tablet    Other R

## 2020-08-31 DIAGNOSIS — C61 PROSTATE CANCER (HCC): ICD-10-CM

## 2020-08-31 RX ORDER — TAMOXIFEN CITRATE 20 MG/1
20 TABLET ORAL
Qty: 30 TABLET | Refills: 6 | Status: SHIPPED | OUTPATIENT
Start: 2020-08-31 | End: 2021-06-10

## 2020-09-02 ENCOUNTER — MED REC SCAN ONLY (OUTPATIENT)
Dept: FAMILY MEDICINE CLINIC | Facility: CLINIC | Age: 65
End: 2020-09-02

## 2020-09-02 ENCOUNTER — HOSPITAL ENCOUNTER (EMERGENCY)
Facility: HOSPITAL | Age: 65
Discharge: HOME | End: 2020-09-02
Attending: EMERGENCY MEDICINE
Payer: COMMERCIAL

## 2020-09-02 VITALS
OXYGEN SATURATION: 96 % | HEIGHT: 70 IN | SYSTOLIC BLOOD PRESSURE: 152 MMHG | TEMPERATURE: 99 F | DIASTOLIC BLOOD PRESSURE: 93 MMHG | WEIGHT: 207 LBS | BODY MASS INDEX: 29.63 KG/M2 | RESPIRATION RATE: 16 BRPM | HEART RATE: 74 BPM

## 2020-09-02 DIAGNOSIS — M25.50 POLYARTHRALGIA: Primary | ICD-10-CM

## 2020-09-02 LAB
ALBUMIN SERPL-MCNC: 3.7 G/DL (ref 3.4–5)
ALBUMIN/GLOB SERPL: 1.3 {RATIO} (ref 1–2)
ALP LIVER SERPL-CCNC: 44 U/L (ref 45–117)
ALT SERPL-CCNC: 28 U/L (ref 16–61)
ANION GAP SERPL CALC-SCNC: 6 MMOL/L (ref 0–18)
AST SERPL-CCNC: 22 U/L (ref 15–37)
BASOPHILS # BLD AUTO: 0.01 X10(3) UL (ref 0–0.2)
BASOPHILS NFR BLD AUTO: 0.3 %
BILIRUB SERPL-MCNC: 1.5 MG/DL (ref 0.1–2)
BUN BLD-MCNC: 12 MG/DL (ref 7–18)
BUN/CREAT SERPL: 7.7 (ref 10–20)
CALCIUM BLD-MCNC: 8.7 MG/DL (ref 8.5–10.1)
CHLORIDE SERPL-SCNC: 107 MMOL/L (ref 98–112)
CO2 SERPL-SCNC: 25 MMOL/L (ref 21–32)
CREAT BLD-MCNC: 1.55 MG/DL (ref 0.7–1.3)
DEPRECATED RDW RBC AUTO: 43.2 FL (ref 35.1–46.3)
EOSINOPHIL # BLD AUTO: 0.08 X10(3) UL (ref 0–0.7)
EOSINOPHIL NFR BLD AUTO: 2.7 %
ERYTHROCYTE [DISTWIDTH] IN BLOOD BY AUTOMATED COUNT: 12.5 % (ref 11–15)
GLOBULIN PLAS-MCNC: 2.9 G/DL (ref 2.8–4.4)
GLUCOSE BLD-MCNC: 178 MG/DL (ref 70–99)
HCT VFR BLD AUTO: 40.8 % (ref 39–53)
HGB BLD-MCNC: 14 G/DL (ref 13–17.5)
IMM GRANULOCYTES # BLD AUTO: 0.03 X10(3) UL (ref 0–1)
IMM GRANULOCYTES NFR BLD: 1 %
LYMPHOCYTES # BLD AUTO: 0.76 X10(3) UL (ref 1–4)
LYMPHOCYTES NFR BLD AUTO: 25.2 %
M PROTEIN MFR SERPL ELPH: 6.6 G/DL (ref 6.4–8.2)
MCH RBC QN AUTO: 32 PG (ref 26–34)
MCHC RBC AUTO-ENTMCNC: 34.3 G/DL (ref 31–37)
MCV RBC AUTO: 93.2 FL (ref 80–100)
MONOCYTES # BLD AUTO: 0.42 X10(3) UL (ref 0.1–1)
MONOCYTES NFR BLD AUTO: 14 %
NEUTROPHILS # BLD AUTO: 1.71 X10 (3) UL (ref 1.5–7.7)
NEUTROPHILS # BLD AUTO: 1.71 X10(3) UL (ref 1.5–7.7)
NEUTROPHILS NFR BLD AUTO: 56.8 %
OSMOLALITY SERPL CALC.SUM OF ELEC: 290 MOSM/KG (ref 275–295)
PLATELET # BLD AUTO: 127 10(3)UL (ref 150–450)
POTASSIUM SERPL-SCNC: 3.6 MMOL/L (ref 3.5–5.1)
RBC # BLD AUTO: 4.38 X10(6)UL (ref 4.3–5.7)
SODIUM SERPL-SCNC: 138 MMOL/L (ref 136–145)
WBC # BLD AUTO: 3 X10(3) UL (ref 4–11)

## 2020-09-02 PROCEDURE — 85025 COMPLETE CBC W/AUTO DIFF WBC: CPT | Performed by: EMERGENCY MEDICINE

## 2020-09-02 PROCEDURE — 99283 EMERGENCY DEPT VISIT LOW MDM: CPT

## 2020-09-02 PROCEDURE — 87040 BLOOD CULTURE FOR BACTERIA: CPT | Performed by: EMERGENCY MEDICINE

## 2020-09-02 PROCEDURE — 80053 COMPREHEN METABOLIC PANEL: CPT | Performed by: EMERGENCY MEDICINE

## 2020-09-02 PROCEDURE — 36415 COLL VENOUS BLD VENIPUNCTURE: CPT

## 2020-09-02 RX ORDER — SULFAMETHOXAZOLE AND TRIMETHOPRIM 800; 160 MG/1; MG/1
1 TABLET ORAL 2 TIMES DAILY
COMMUNITY
End: 2020-10-07

## 2020-09-02 NOTE — ED PROVIDER NOTES
Patient Seen in: [facility] Emergency Department      History   Patient presents with:  Cellulitis    Stated Complaint: spetic knee    HPI    Patient presents to rule out sepsis.   The patient started having pain in early August in both feet and ankl carcinoma 06/19/2002    right forehead   • Calculus of kidney    • Hyperlipidemia    • Personal history of arthritis    • [facility]    [facility]   • Squamous cell carcinoma 2/4/2016    in-situ, mid upper forehead   • Squamous systems are as noted in HPI. Constitutional and vital signs reviewed. All other systems reviewed and negative except as noted above.     Physical Exam     ED Triage Vitals [09/02/20 1613]   BP (!) 166/88   Pulse 84   Resp 16   Temp 98.9 °F (37.2 °C) DIFFERENTIAL[[id number]]          Abnormal            Final result                 Please view results for these tests on the individual orders.    RAINBOW DRAW BLUE   RAINBOW DRAW LAVENDER   RAINBOW DRAW LIGHT GREEN   RAINBOW DRAW GOLD   BLOOD CULTURE   BLO

## 2020-09-02 NOTE — ED INITIAL ASSESSMENT (HPI)
Pt states had left knee drained on 8/17. Pt states he was dx with mrsa and is currently on abx. Pt states swelling and tenderness noted to bilateral elbows. Pt was seen by pcp and sent her for evaluation.

## 2020-09-03 ENCOUNTER — OFFICE VISIT (OUTPATIENT)
Dept: FAMILY MEDICINE CLINIC | Facility: CLINIC | Age: 65
End: 2020-09-03
Payer: COMMERCIAL

## 2020-09-03 VITALS
RESPIRATION RATE: 16 BRPM | HEART RATE: 78 BPM | BODY MASS INDEX: 29.92 KG/M2 | TEMPERATURE: 98 F | DIASTOLIC BLOOD PRESSURE: 66 MMHG | SYSTOLIC BLOOD PRESSURE: 120 MMHG | HEIGHT: 70 IN | WEIGHT: 209 LBS

## 2020-09-03 DIAGNOSIS — M25.50 ARTHRALGIA, UNSPECIFIED JOINT: Primary | ICD-10-CM

## 2020-09-03 DIAGNOSIS — M25.462 SWELLING OF JOINT, KNEE, LEFT: ICD-10-CM

## 2020-09-03 DIAGNOSIS — R53.83 FATIGUE, UNSPECIFIED TYPE: ICD-10-CM

## 2020-09-03 PROCEDURE — 3074F SYST BP LT 130 MM HG: CPT | Performed by: FAMILY MEDICINE

## 2020-09-03 PROCEDURE — 99214 OFFICE O/P EST MOD 30 MIN: CPT | Performed by: FAMILY MEDICINE

## 2020-09-03 PROCEDURE — 3008F BODY MASS INDEX DOCD: CPT | Performed by: FAMILY MEDICINE

## 2020-09-03 PROCEDURE — 3078F DIAST BP <80 MM HG: CPT | Performed by: FAMILY MEDICINE

## 2020-09-03 RX ORDER — TRAMADOL HYDROCHLORIDE 50 MG/1
50 TABLET ORAL EVERY 6 HOURS PRN
Qty: 30 TABLET | Refills: 1 | Status: SHIPPED | OUTPATIENT
Start: 2020-09-03 | End: 2020-10-18

## 2020-09-03 NOTE — PROGRESS NOTES
Patient presents with:  Pain: L knee, arms , elbows and wrist since august       Subjective   HPI:   This is a 62year old male coming in for swelling right knee, was worse, new abd, pain still 5/10 and ain shooting to left ankle and right shoulder.  Uric a Primary    Swelling of joint, knee, left        Fatigue, unspecified type           no sign of infeciton now. taklked to [facility] rheumatologist esterday, and we worried It was infected, but looks like bactrim for 14 days and clinda for 2 helped.  Con

## 2020-09-09 ENCOUNTER — PATIENT OUTREACH (OUTPATIENT)
Dept: INFECTIOUS DISEASE | Facility: CLINIC | Age: 65
End: 2020-09-09

## 2020-09-09 NOTE — PROGRESS NOTES
Patient had left knee swelling and underwent aspiration in orthopedic office at [address] [place] [address]; Cell count was WBC 2,955(78%PMN), ,000, and culture grew Staph aurues.  He was sen to ED at [facility] on 9/2 and was found that the swelling had resolved with NSAID

## 2020-10-06 NOTE — ASSESSMENT & PLAN NOTE
Last Platelet value was 271 done 9/2/2020.  Lowest level in the last 10 years was 119. stable continue present management

## 2020-10-07 ENCOUNTER — OFFICE VISIT (OUTPATIENT)
Dept: FAMILY MEDICINE CLINIC | Facility: CLINIC | Age: 65
End: 2020-10-07
Payer: COMMERCIAL

## 2020-10-07 VITALS
HEIGHT: 70 IN | TEMPERATURE: 98 F | DIASTOLIC BLOOD PRESSURE: 72 MMHG | HEART RATE: 74 BPM | BODY MASS INDEX: 30.32 KG/M2 | WEIGHT: 211.81 LBS | RESPIRATION RATE: 16 BRPM | SYSTOLIC BLOOD PRESSURE: 134 MMHG

## 2020-10-07 DIAGNOSIS — Z91.89 AT HIGH RISK FOR PNEUMONIA: ICD-10-CM

## 2020-10-07 DIAGNOSIS — Z23 NEED FOR VACCINATION: ICD-10-CM

## 2020-10-07 DIAGNOSIS — D69.6 THROMBOCYTOPENIA (HCC): ICD-10-CM

## 2020-10-07 DIAGNOSIS — E78.2 MIXED HYPERLIPIDEMIA: ICD-10-CM

## 2020-10-07 DIAGNOSIS — Z00.00 ANNUAL PHYSICAL EXAM: Primary | ICD-10-CM

## 2020-10-07 DIAGNOSIS — F51.01 PRIMARY INSOMNIA: ICD-10-CM

## 2020-10-07 DIAGNOSIS — C50.121 MALIGNANT NEOPLASM OF CENTRAL PORTION OF RIGHT BREAST IN MALE, ESTROGEN RECEPTOR POSITIVE (HCC): ICD-10-CM

## 2020-10-07 DIAGNOSIS — Z17.0 MALIGNANT NEOPLASM OF CENTRAL PORTION OF RIGHT BREAST IN MALE, ESTROGEN RECEPTOR POSITIVE (HCC): ICD-10-CM

## 2020-10-07 PROCEDURE — 3078F DIAST BP <80 MM HG: CPT | Performed by: FAMILY MEDICINE

## 2020-10-07 PROCEDURE — 90732 PPSV23 VACC 2 YRS+ SUBQ/IM: CPT | Performed by: FAMILY MEDICINE

## 2020-10-07 PROCEDURE — 3008F BODY MASS INDEX DOCD: CPT | Performed by: FAMILY MEDICINE

## 2020-10-07 PROCEDURE — 90686 IIV4 VACC NO PRSV 0.5 ML IM: CPT | Performed by: FAMILY MEDICINE

## 2020-10-07 PROCEDURE — 3075F SYST BP GE 130 - 139MM HG: CPT | Performed by: FAMILY MEDICINE

## 2020-10-07 PROCEDURE — 90472 IMMUNIZATION ADMIN EACH ADD: CPT | Performed by: FAMILY MEDICINE

## 2020-10-07 PROCEDURE — 90471 IMMUNIZATION ADMIN: CPT | Performed by: FAMILY MEDICINE

## 2020-10-07 PROCEDURE — 99396 PREV VISIT EST AGE 40-64: CPT | Performed by: FAMILY MEDICINE

## 2020-10-07 NOTE — PROGRESS NOTES
Ubaldo Triana is a 60year old male who presents for a complete physical exam.   HPI:   Patient presents with:  Physical: Annual  Immunization/Injection: Flu      His last annual assessment has been over 1 year: Annual Physical due on 10/03/2020       Pt HDL 33 (L) 08/05/2020 07:33 AM    TRIG 120 08/05/2020 07:33 AM    LDL 52 08/05/2020 07:33 AM    NONHDLC 76 08/05/2020 07:33 AM       Lab Results   Component Value Date/Time    A1C 5.5 09/18/2018    VITD 23.1 (L) 08/05/2020 07:33 AM    PSA 5.530 (H) 04/2 sister; Cancer (age of onset: 62) in his self; Diabetes in his father; Gastro-Intestinal Disorder in his father; Gout in his brother; Heart Disease in his mother; Neurological Disorder in his father; Other in his father; Ovarian Cancer in his sister.      H midline, oropharynx is clear and moist and mucous membranes are normal. No oropharyngeal exudate. Eyes: Pupils are equal, round, and reactive to light. Conjunctivae and EOM are normal.   Neck: Trachea normal and normal range of motion. Neck supple.  [place] • Fluvirin, 3 Years & >, Im 09/17/2015   • HEP B, Adult 10/03/2019, 01/27/2020, 08/07/2020   • Hep A, Adult 10/03/2019   • Influenza 10/04/2013, 09/11/2014, 09/22/2015, 09/21/2016, 09/27/2017, 09/18/2018, 10/15/2019   • Pneumococcal (Prevnar 13) 09/28/20 Relevant Orders    PNEUMOCOCCAL IMM (PNEUMOVAX) (Completed)    At high risk for pneumonia        Relevant Orders    PNEUMOCOCCAL IMM (PNEUMOVAX) (Completed)         Return in about 6 months (around 4/7/2021) for recheck.     Mario Carlos MD, 10/7/202

## 2020-10-14 ENCOUNTER — TELEPHONE (OUTPATIENT)
Dept: HEMATOLOGY/ONCOLOGY | Facility: HOSPITAL | Age: 65
End: 2020-10-14

## 2020-10-14 ENCOUNTER — TELEPHONE (OUTPATIENT)
Dept: FAMILY MEDICINE CLINIC | Facility: CLINIC | Age: 65
End: 2020-10-14

## 2020-10-14 NOTE — TELEPHONE ENCOUNTER
Patient was informed that he can stop tamoxifen one week prior surgery and restart one week after surgery. Also, he was informed  That it is ok to be on blood thinners for six weeks per MD Steele. Pt verbalizes understanding.

## 2020-10-14 NOTE — TELEPHONE ENCOUNTER
Theodore Mcgee called saying he is getting shoulder surgery in November, and his surgeon wanted him to talk to [facility] ([facility]) about whether or not he should stop taking his tamoxifen prior to this.  He also says the surgeon wanted to put him on blood thinners for 6 weeks aft

## 2020-10-14 NOTE — TELEPHONE ENCOUNTER
Pt had his Physical on Oct 9th and is requesting a letter faxed to Dr Felisha Finley ([facility] in [address] [place] [address] [phone])  to be cleared for upcoming R shoulder repair on 11/3/20

## 2020-10-15 ENCOUNTER — TELEPHONE (OUTPATIENT)
Dept: FAMILY MEDICINE CLINIC | Facility: CLINIC | Age: 65
End: 2020-10-15

## 2020-10-15 NOTE — TELEPHONE ENCOUNTER
Received fax from [facility] orthopedics needing additional information for pt pre op clearance. They would like to know if patient needs, urology, hematology, Hepatology, nephrology and clearance from breast cancer surgeon.   Form is in Dr Jocelyn Sosa in box

## 2020-10-15 NOTE — TELEPHONE ENCOUNTER
Called and told him of OK for surgery, he also wanted Dr Alfredo Garcia to know that they are putting him on anticoagulants after the surgery. He wanted to know if Dr Mak Reina thought that would be OK.

## 2020-10-15 NOTE — PROGRESS NOTES
Addendum:   Dr Willis Chavarria ([facility] in [address] [place] [address] [phone])  to be cleared for upcoming R shoulder repair on 11/3/20     WHen 10.9 CPX done, we also discussed clearance for surgery but did not have date or more speciifc info.  He is katerine

## 2020-10-16 ENCOUNTER — TELEPHONE (OUTPATIENT)
Dept: FAMILY MEDICINE CLINIC | Facility: CLINIC | Age: 65
End: 2020-10-16

## 2020-10-16 NOTE — TELEPHONE ENCOUNTER
Offered appt today, but pt declined. Advised that pt go to [facility]. Pt agrees with the plan.

## 2020-10-16 NOTE — TELEPHONE ENCOUNTER
Patient has a new issue, possible UTI. Just saw Dr. Juan David Dozier for his physical two weeks ago, he said he has to have it done quick surgery on 11/3/20. He declined an appt.

## 2020-10-18 ENCOUNTER — HOSPITAL ENCOUNTER (OUTPATIENT)
Age: 65
Discharge: HOME | End: 2020-10-18
Attending: EMERGENCY MEDICINE
Payer: COMMERCIAL

## 2020-10-18 VITALS
RESPIRATION RATE: 18 BRPM | TEMPERATURE: 98 F | BODY MASS INDEX: 29.78 KG/M2 | DIASTOLIC BLOOD PRESSURE: 88 MMHG | OXYGEN SATURATION: 98 % | SYSTOLIC BLOOD PRESSURE: 153 MMHG | HEIGHT: 70 IN | HEART RATE: 79 BPM | WEIGHT: 208 LBS

## 2020-10-18 DIAGNOSIS — N30.01 ACUTE CYSTITIS WITH HEMATURIA: Primary | ICD-10-CM

## 2020-10-18 PROCEDURE — 87088 URINE BACTERIA CULTURE: CPT | Performed by: EMERGENCY MEDICINE

## 2020-10-18 PROCEDURE — 99214 OFFICE O/P EST MOD 30 MIN: CPT

## 2020-10-18 PROCEDURE — 87186 SC STD MICRODIL/AGAR DIL: CPT | Performed by: EMERGENCY MEDICINE

## 2020-10-18 PROCEDURE — 87086 URINE CULTURE/COLONY COUNT: CPT | Performed by: EMERGENCY MEDICINE

## 2020-10-18 PROCEDURE — 81002 URINALYSIS NONAUTO W/O SCOPE: CPT | Performed by: EMERGENCY MEDICINE

## 2020-10-18 RX ORDER — SULFAMETHOXAZOLE AND TRIMETHOPRIM 800; 160 MG/1; MG/1
1 TABLET ORAL 2 TIMES DAILY
Qty: 20 TABLET | Refills: 0 | Status: SHIPPED | OUTPATIENT
Start: 2020-10-18 | End: 2020-10-28

## 2020-10-18 NOTE — ED INITIAL ASSESSMENT (HPI)
The patient states since Thursday he has had dysuria, pelvic and low back pain, cloudy urine, hematuria. He did have some chills last night but not sure if it was related to the dysuria - he has not had a fever. He hasn't taken anything for his symptoms.

## 2020-10-18 NOTE — ED PROVIDER NOTES
Patient Seen in: [facility] [place]      History   Patient presents with:  Urinary Symptoms    Stated Complaint: Urinary Symptoms    HPI    Patient is a 52-year-old male presents emergency room with a history of painful urination and frequent uri carcinoma 8/22/2013    in-situ, right posterior neck; in-situ, right upper arm              Past Surgical History:   Procedure Laterality Date   • BREAST SENTINEL LYMPH NODE BIOPSY Right 12/15/2015    Performed by Angel Watson MD at [facility] MAIN OR   • CO Current:/88   Pulse 79   Temp 97.9 °F (36.6 °C) (Temporal)   Resp 18   Ht 177.8 cm (5' 10\")   Wt 94.3 kg   SpO2 98%   BMI 29.84 kg/m²         Physical Exam  GENERAL: Well-developed, well-nourished male sitting up breathing easily in no apparen treated successfully as an outpatient as per patient. Patient usually is treated successfully with Bactrim.   Patient given prescription for Bactrim which she will take for 10 days instructions to encourage plenty of fluids and rest at home return to the e

## 2020-10-20 ENCOUNTER — OFFICE VISIT (OUTPATIENT)
Dept: RHEUMATOLOGY | Facility: CLINIC | Age: 65
End: 2020-10-20
Payer: COMMERCIAL

## 2020-10-20 VITALS
DIASTOLIC BLOOD PRESSURE: 82 MMHG | HEART RATE: 84 BPM | SYSTOLIC BLOOD PRESSURE: 140 MMHG | TEMPERATURE: 97 F | RESPIRATION RATE: 16 BRPM | OXYGEN SATURATION: 98 % | HEIGHT: 70 IN | BODY MASS INDEX: 29.63 KG/M2 | WEIGHT: 207 LBS

## 2020-10-20 DIAGNOSIS — M25.521 BILATERAL ELBOW JOINT PAIN: ICD-10-CM

## 2020-10-20 DIAGNOSIS — M25.531 BILATERAL WRIST PAIN: ICD-10-CM

## 2020-10-20 DIAGNOSIS — M25.532 BILATERAL WRIST PAIN: ICD-10-CM

## 2020-10-20 DIAGNOSIS — M25.50 POLYARTHRALGIA: ICD-10-CM

## 2020-10-20 DIAGNOSIS — Z82.69 FAMILY HISTORY OF GOUT: ICD-10-CM

## 2020-10-20 DIAGNOSIS — M25.522 BILATERAL ELBOW JOINT PAIN: ICD-10-CM

## 2020-10-20 DIAGNOSIS — D72.819 LEUKOPENIA, UNSPECIFIED TYPE: ICD-10-CM

## 2020-10-20 DIAGNOSIS — M19.90 INFLAMMATORY ARTHRITIS: Primary | ICD-10-CM

## 2020-10-20 PROCEDURE — 99244 OFF/OP CNSLTJ NEW/EST MOD 40: CPT | Performed by: INTERNAL MEDICINE

## 2020-10-20 PROCEDURE — 3079F DIAST BP 80-89 MM HG: CPT | Performed by: INTERNAL MEDICINE

## 2020-10-20 PROCEDURE — 3008F BODY MASS INDEX DOCD: CPT | Performed by: INTERNAL MEDICINE

## 2020-10-20 PROCEDURE — 3077F SYST BP >= 140 MM HG: CPT | Performed by: INTERNAL MEDICINE

## 2020-10-20 NOTE — PROGRESS NOTES
?  RHEUMATOLOGY NEW PATIENT   Date of visit: 10/20/2020  ?   Patient presents with:  Consult: Ref by Dr Madelin Sr; started 8/7/2020 bilat foot pain, left knee swelling, bilat elbow pain; saw  ID,  Dr Madelin Sr, Dr Colletta Rival, Dr Chalino Baker, about 6.9 could technically precipitate an attack). Pt is willing to wait until workup complete prior to initiating any medication intervention at this time. Patient verbalized understanding of above instructions. No further questions at this time. DIFFERENTIAL WITH PLATELET; Future  -     MONOCLONAL PROTEIN STUDY; Future  -     URIC ACID, SERUM; Future    Family history of gout  -     URIC ACID, SERUM; Future    Leukopenia, unspecified type  -     CBC WITH DIFFERENTIAL WITH PLATELET;  Future  -     M related   + hx of rashes but stopped after prostate surgery, no recurrence since that time.    + hx of borderline thrombocytopenia   + hx of fatty liver disease  + hx of renal stones   + tightness in the calves  + intermittent lymphadenopathy around the ang Dr Justus Long   • Personal history of arthritis    • Prostate cancer [facility]) 1/2015    [facility]   • Squamous cell carcinoma 2/4/2016    in-situ, mid upper forehead   • Squamous cell carcinoma 4/8/2014    in-situ, mid upper chest   • Squamous cell (Benign breast biopsy) Sister 39   • Other (Benign breast biopsy) Sister    • Breast Cancer Paternal Aunt         d.92   • Cancer Maternal Uncle         brain ca; d.40's   • Other (Gout) Brother    • Cancer Self 63        prostate   • Breast Cancer Self 62 for dysuria and hematuria. UTI currently   Musculoskeletal: Positive for joint pain. Negative for back pain and neck pain. Ankles, knees, wrists, hands   Skin: Negative for itching and rash.    Neurological: Negative for dizziness, weakness an restriction of motion of the DIPs, PIPs, MCPs, or ankles. Right shoulder limited ROM; left grossly normal   Bilateral knees without medial joint line tenderness, mild crepitus, no effusion. Lymphadenopathy:     He has no cervical adenopathy.    Neurologi screw anteriorly which is best appreciated   on the lateral view.          =====  CONCLUSION:  Fracture of a fusion screw anteriorly within the midfoot. This is age indeterminate. Correlation with any other prior images is suggested.      Moderate degenmena mm AP dimension, causing focal effacement of the thecal sac but no sign of compression of the lower thoracic spinal cord. These thoracic levels are only visualized on the   sagittal images including the lower thoracic, not visualize on axial images.   The can be followed up if clinically needed.      =====  CONCLUSION:  Bilateral L5 pars defect with grade 1 anterior subluxation L5, and bilateral foraminal stenosis worse on the left.   Milder degenerative disc and facet changes at other levels as described ab screen negative  BAMBI panel negative  ESR 13 normal  CRP normal  CCP negative       Ruba Maurice, DO  [facility]  10/20/2020

## 2020-10-22 ENCOUNTER — LAB ENCOUNTER (OUTPATIENT)
Dept: LAB | Age: 65
End: 2020-10-22
Attending: INTERNAL MEDICINE
Payer: COMMERCIAL

## 2020-10-22 DIAGNOSIS — D72.819 LEUKOPENIA, UNSPECIFIED TYPE: ICD-10-CM

## 2020-10-22 DIAGNOSIS — M25.531 BILATERAL WRIST PAIN: ICD-10-CM

## 2020-10-22 DIAGNOSIS — M25.521 BILATERAL ELBOW JOINT PAIN: ICD-10-CM

## 2020-10-22 DIAGNOSIS — M25.522 BILATERAL ELBOW JOINT PAIN: ICD-10-CM

## 2020-10-22 DIAGNOSIS — M25.50 POLYARTHRALGIA: ICD-10-CM

## 2020-10-22 DIAGNOSIS — M19.90 INFLAMMATORY ARTHRITIS: ICD-10-CM

## 2020-10-22 DIAGNOSIS — M25.532 BILATERAL WRIST PAIN: ICD-10-CM

## 2020-10-22 DIAGNOSIS — Z82.69 FAMILY HISTORY OF GOUT: ICD-10-CM

## 2020-10-22 PROCEDURE — 86038 ANTINUCLEAR ANTIBODIES: CPT | Performed by: INTERNAL MEDICINE

## 2020-10-22 PROCEDURE — 84550 ASSAY OF BLOOD/URIC ACID: CPT | Performed by: INTERNAL MEDICINE

## 2020-10-22 PROCEDURE — 86200 CCP ANTIBODY: CPT | Performed by: INTERNAL MEDICINE

## 2020-10-22 PROCEDURE — 86140 C-REACTIVE PROTEIN: CPT | Performed by: INTERNAL MEDICINE

## 2020-10-22 PROCEDURE — 83883 ASSAY NEPHELOMETRY NOT SPEC: CPT | Performed by: INTERNAL MEDICINE

## 2020-10-22 PROCEDURE — 85025 COMPLETE CBC W/AUTO DIFF WBC: CPT | Performed by: INTERNAL MEDICINE

## 2020-10-22 PROCEDURE — 85652 RBC SED RATE AUTOMATED: CPT | Performed by: INTERNAL MEDICINE

## 2020-10-22 PROCEDURE — 86334 IMMUNOFIX E-PHORESIS SERUM: CPT | Performed by: INTERNAL MEDICINE

## 2020-10-22 PROCEDURE — 86431 RHEUMATOID FACTOR QUANT: CPT | Performed by: INTERNAL MEDICINE

## 2020-10-22 PROCEDURE — 84165 PROTEIN E-PHORESIS SERUM: CPT | Performed by: INTERNAL MEDICINE

## 2020-10-22 PROCEDURE — 86812 HLA TYPING A B OR C: CPT | Performed by: INTERNAL MEDICINE

## 2020-10-22 PROCEDURE — 36415 COLL VENOUS BLD VENIPUNCTURE: CPT | Performed by: INTERNAL MEDICINE

## 2020-10-27 ENCOUNTER — TELEPHONE (OUTPATIENT)
Dept: FAMILY MEDICINE CLINIC | Facility: CLINIC | Age: 65
End: 2020-10-27

## 2020-10-27 NOTE — TELEPHONE ENCOUNTER
Patient was seen 10/18/20 in urgent care for a UTI and was advised to follow up with PCP.  Patient is no longer having any issues and would like to know if it's required he be seen as he is very busy preparing for an upcoming surgery

## 2020-11-02 ENCOUNTER — TELEPHONE (OUTPATIENT)
Dept: RHEUMATOLOGY | Facility: CLINIC | Age: 65
End: 2020-11-02

## 2020-11-02 NOTE — TELEPHONE ENCOUNTER
Call patient. Discussed test results in detail. Labs are grossly negative including RF, CCP, ARNAUD as well as inflammatory markers and uric acid. Patient does have plans for right shoulder surgery tomorrow for supraspinatus and infraspinatus repair.   Stat
No

## 2020-11-16 ENCOUNTER — TELEPHONE (OUTPATIENT)
Dept: RHEUMATOLOGY | Facility: CLINIC | Age: 65
End: 2020-11-16

## 2020-11-16 NOTE — TELEPHONE ENCOUNTER
Called pt to review notes per Dr Iris Leiva. Pt verbalized understanding and agreed to call if needed.

## 2020-11-16 NOTE — TELEPHONE ENCOUNTER
Patient called with status update, FYI. Had R. Shoulder \"masive rotator cuff repair\" on 11/3 with Dr. Rocío Alcocer @ [place] [id number]. PO visit today 11/16. Was told by surgeon to hold off meds for 6-8 weeks after surgery.  Will be in sling for 8 weeks and goi

## 2020-11-22 DIAGNOSIS — F51.01 PRIMARY INSOMNIA: ICD-10-CM

## 2020-11-30 DIAGNOSIS — F51.01 PRIMARY INSOMNIA: ICD-10-CM

## 2020-12-02 RX ORDER — ZOLPIDEM TARTRATE 12.5 MG/1
TABLET, FILM COATED, EXTENDED RELEASE ORAL
Qty: 30 TABLET | Refills: 5 | Status: SHIPPED | OUTPATIENT
Start: 2020-12-02 | End: 2021-04-07

## 2020-12-02 NOTE — TELEPHONE ENCOUNTER
Refill request for:    Requested Prescriptions     Pending Prescriptions Disp Refills   • Zolpidem Tartrate ER 12.5 MG Oral Tab CR 30 tablet 5     Sig: TAKE ONE TABLET BY MOUTH NIGHTLY AS NEEDED FOR SLEEP        Last Prescribed Quantity Refills   5/20/2020

## 2020-12-03 RX ORDER — ZOLPIDEM TARTRATE 12.5 MG/1
TABLET, FILM COATED, EXTENDED RELEASE ORAL
Qty: 30 TABLET | Refills: 5 | OUTPATIENT
Start: 2020-12-03

## 2020-12-03 RX ORDER — OXYCODONE AND ACETAMINOPHEN 10; 325 MG/1; MG/1
TABLET ORAL
COMMUNITY
Start: 2020-11-03 | End: 2021-01-26

## 2020-12-03 NOTE — TELEPHONE ENCOUNTER
Zolpidem Tartrate ER 12.5 MG Oral Tab CR          The original prescription was reordered on 12/2/2020 by Chucky Colby MD. Renewing this prescription may not be appropriate.      Possible duplicate: Hover to review recent actions on this medication    Sig:

## 2020-12-28 ENCOUNTER — LAB ENCOUNTER (OUTPATIENT)
Dept: LAB | Age: 65
End: 2020-12-28
Attending: FAMILY MEDICINE
Payer: COMMERCIAL

## 2020-12-28 ENCOUNTER — VIRTUAL PHONE E/M (OUTPATIENT)
Dept: FAMILY MEDICINE CLINIC | Facility: CLINIC | Age: 65
End: 2020-12-28
Payer: COMMERCIAL

## 2020-12-28 DIAGNOSIS — R50.9 FEVER, UNSPECIFIED FEVER CAUSE: ICD-10-CM

## 2020-12-28 DIAGNOSIS — R50.9 FEVER, UNSPECIFIED FEVER CAUSE: Primary | ICD-10-CM

## 2020-12-28 DIAGNOSIS — J01.41 ACUTE RECURRENT PANSINUSITIS: ICD-10-CM

## 2020-12-28 PROCEDURE — 99213 OFFICE O/P EST LOW 20 MIN: CPT | Performed by: FAMILY MEDICINE

## 2020-12-28 RX ORDER — APIXABAN 2.5 MG/1
2.5 TABLET, FILM COATED ORAL 2 TIMES DAILY
COMMUNITY
Start: 2020-12-04 | End: 2021-01-26

## 2020-12-28 RX ORDER — CEFUROXIME AXETIL 500 MG/1
500 TABLET ORAL 2 TIMES DAILY
Qty: 20 TABLET | Refills: 0 | Status: SHIPPED | OUTPATIENT
Start: 2020-12-28 | End: 2021-01-07

## 2020-12-28 NOTE — PROGRESS NOTES
Virtual/Telephone Check-In    Jeffery Jean verbally consents to a Virtual/Telephone Check-In service on 12/28/20. Patient has been referred to the [facility] website at [email or web address] to review the yearly Consent to Treat document.   Patient underst Visit Diagnoses     Fever, unspecified fever cause    -  Primary    Relevant Orders    SARS-COV-2 BY PCR ()    Acute recurrent pansinusitis        Relevant Medications    Cefuroxime Axetil 500 MG Oral Tab      likely sinusists but cannot rule out CO

## 2020-12-29 LAB — SARS-COV-2 RNA RESP QL NAA+PROBE: NOT DETECTED

## 2021-01-26 ENCOUNTER — TELEMEDICINE (OUTPATIENT)
Dept: RHEUMATOLOGY | Facility: CLINIC | Age: 66
End: 2021-01-26
Payer: COMMERCIAL

## 2021-01-26 VITALS — BODY MASS INDEX: 29.63 KG/M2 | WEIGHT: 207 LBS | HEIGHT: 70 IN

## 2021-01-26 DIAGNOSIS — E55.9 VITAMIN D DEFICIENCY: ICD-10-CM

## 2021-01-26 DIAGNOSIS — M25.531 BILATERAL WRIST PAIN: ICD-10-CM

## 2021-01-26 DIAGNOSIS — D72.819 LEUKOPENIA, UNSPECIFIED TYPE: ICD-10-CM

## 2021-01-26 DIAGNOSIS — M19.90 INFLAMMATORY ARTHRITIS: Primary | ICD-10-CM

## 2021-01-26 DIAGNOSIS — M25.50 POLYARTHRALGIA: ICD-10-CM

## 2021-01-26 DIAGNOSIS — M25.532 BILATERAL WRIST PAIN: ICD-10-CM

## 2021-01-26 PROCEDURE — 3008F BODY MASS INDEX DOCD: CPT | Performed by: INTERNAL MEDICINE

## 2021-01-26 PROCEDURE — 99214 OFFICE O/P EST MOD 30 MIN: CPT | Performed by: INTERNAL MEDICINE

## 2021-01-26 NOTE — PROGRESS NOTES
[facility] TeleHealth Audio and Visual Visit   In- Office visit canceled due to coronavirus pandemic    This was an audio/video conversation using Doximity in lieu of an in-person visit due to need to limit person to person contact during the [facility] is a 61 yo man with a complicated history of prostate cancer, breast cancer as well as multiple skin cancers who presents for evaluation due to polyarticular joint pain and swelling. States symptoms started in July/August 2020.  He had a complicated obtained history, performing a medically appropriate examination, counseling and educating the patient/family/caregiver, ordering medications or testing, referring and communicating with other healthcare providers, documenting clinical information in the E surgeon- Dr. Janessa Abreu through [facility] orthopedic. Pt report surgeon wanting to discuss before any medication intervention started. Denies skin rash      HPI from initial consultation  referred for rheumatologic evaluation due to joint pain.      States he around the angle of the jaw and left armpit; regardless of feeling sick.    + night sweats thought related to tamoxifen   + admits to slow healing of minor wounds   + gets blood in urine typically when has UTI    The patient denies oral or nasal ulcers, edna Squamous cell carcinoma 9/26/2013    in-situ, left upper arm; in-situ, left forearm   • Squamous cell carcinoma 8/22/2013    in-situ, right posterior neck; in-situ, right upper arm     Past Surgical History:  Past Surgical History:   Procedure Laterality D Cancer Self 60        invasive ductal ca     Social History:  Social History    Tobacco Use      Smoking status: Never Smoker      Smokeless tobacco: Never Used    Alcohol use:  Yes      Alcohol/week: 2.0 - 3.0 standard drinks      Types: 2 - 3 Standard dri urgency. UTI currently   Musculoskeletal: Positive for joint pain. Negative for back pain, myalgias and neck pain. Ankles, knees, wrists, hands   Skin: Negative for itching and rash.    Neurological: Negative for dizziness, tingling, seizures, and affect. His behavior is normal.   Nursing note and vitals reviewed. ?  Radiology review:       PROCEDURE:  XR FOOT WEIGHTBEARING (3 VIEWS), RIGHT (CPT=73630)     INDICATIONS:  M25.50 Pain in unspecified joint     COMPARISON:  None.      PATIENT STATE STATED HISTORY: (As transcribed by Technologist)  Left knee pain, no injury. FINDINGS:    No joint effusion is seen. No acute fracture or dislocation is seen. Normal bone mineral density.   Normal alignment.          =====  CONCLUSION:  No acute of the central canal or neural foramina. L4-L5:  Greater disc degeneration at this level with loss of disc height, and moderate disc bulging. The disc bulging is asymmetric slightly greater to the right but no focal protrusion.   Mild bilateral neural MCHC 34.3 10/22/2020    RDW 12.6 10/22/2020    NEPRELIM 1.77 10/22/2020    NEUTABS 2538 06/04/2011    LYMPHABS 959 06/04/2011    EOSABS 98 06/04/2011    BASABS 21 06/04/2011    NEUT 61.9 06/04/2011    LYMPH 23.4 06/04/2011    MON 11.8 06/04/2011    EOS

## 2021-01-28 ENCOUNTER — TELEPHONE (OUTPATIENT)
Dept: RHEUMATOLOGY | Facility: CLINIC | Age: 66
End: 2021-01-28

## 2021-01-28 NOTE — TELEPHONE ENCOUNTER
Spoke to Dr. Fernanda HERRERA and updated on his care. Patient had surgery November 3. Discussed that he does have some worsened pain and potentially inflammatory arthritis. I am considering either prescription NSAID versus steroid taper.   At this point from

## 2021-01-28 NOTE — TELEPHONE ENCOUNTER
Pt returned our call. Notified of Dr. Сергей Joya instructions. Dr. Ansley Evangelista spoke to his Ortho's office. Colby Napier said it would be fine with either NSAID or prednisone. Pt  Depending on his results will determine next steps in management.   Pt will be going to ha

## 2021-02-01 ENCOUNTER — LAB ENCOUNTER (OUTPATIENT)
Dept: LAB | Age: 66
End: 2021-02-01
Attending: ORTHOPAEDIC SURGERY
Payer: COMMERCIAL

## 2021-02-01 DIAGNOSIS — M19.90 INFLAMMATORY ARTHRITIS: ICD-10-CM

## 2021-02-01 DIAGNOSIS — M25.50 POLYARTHRALGIA: ICD-10-CM

## 2021-02-01 DIAGNOSIS — E55.9 VITAMIN D DEFICIENCY: ICD-10-CM

## 2021-02-01 DIAGNOSIS — D72.819 LEUKOPENIA, UNSPECIFIED TYPE: ICD-10-CM

## 2021-02-01 LAB
ALBUMIN SERPL-MCNC: 4 G/DL (ref 3.4–5)
ALBUMIN/GLOB SERPL: 1.6 {RATIO} (ref 1–2)
ALP LIVER SERPL-CCNC: 63 U/L
ALT SERPL-CCNC: 28 U/L
ANION GAP SERPL CALC-SCNC: 3 MMOL/L (ref 0–18)
AST SERPL-CCNC: 22 U/L (ref 15–37)
BASOPHILS # BLD AUTO: 0.02 X10(3) UL (ref 0–0.2)
BASOPHILS NFR BLD AUTO: 0.6 %
BILIRUB SERPL-MCNC: 1.2 MG/DL (ref 0.1–2)
BUN BLD-MCNC: 11 MG/DL (ref 7–18)
BUN/CREAT SERPL: 9.5 (ref 10–20)
CALCIUM BLD-MCNC: 9.1 MG/DL (ref 8.5–10.1)
CHLORIDE SERPL-SCNC: 110 MMOL/L (ref 98–112)
CO2 SERPL-SCNC: 28 MMOL/L (ref 21–32)
CREAT BLD-MCNC: 1.16 MG/DL
CRP SERPL-MCNC: <0.29 MG/DL (ref ?–0.3)
DEPRECATED RDW RBC AUTO: 43.7 FL (ref 35.1–46.3)
EOSINOPHIL # BLD AUTO: 0.13 X10(3) UL (ref 0–0.7)
EOSINOPHIL NFR BLD AUTO: 3.6 %
ERYTHROCYTE [DISTWIDTH] IN BLOOD BY AUTOMATED COUNT: 13.2 % (ref 11–15)
GLOBULIN PLAS-MCNC: 2.5 G/DL (ref 2.8–4.4)
GLUCOSE BLD-MCNC: 99 MG/DL (ref 70–99)
HCT VFR BLD AUTO: 43 %
HGB BLD-MCNC: 14.2 G/DL
IMM GRANULOCYTES # BLD AUTO: 0.06 X10(3) UL (ref 0–1)
IMM GRANULOCYTES NFR BLD: 1.7 %
LYMPHOCYTES # BLD AUTO: 0.94 X10(3) UL (ref 1–4)
LYMPHOCYTES NFR BLD AUTO: 26.2 %
M PROTEIN MFR SERPL ELPH: 6.5 G/DL (ref 6.4–8.2)
MCH RBC QN AUTO: 30.3 PG (ref 26–34)
MCHC RBC AUTO-ENTMCNC: 33 G/DL (ref 31–37)
MCV RBC AUTO: 91.7 FL
MONOCYTES # BLD AUTO: 0.61 X10(3) UL (ref 0.1–1)
MONOCYTES NFR BLD AUTO: 17 %
NEUTROPHILS # BLD AUTO: 1.83 X10 (3) UL (ref 1.5–7.7)
NEUTROPHILS # BLD AUTO: 1.83 X10(3) UL (ref 1.5–7.7)
NEUTROPHILS NFR BLD AUTO: 50.9 %
OSMOLALITY SERPL CALC.SUM OF ELEC: 291 MOSM/KG (ref 275–295)
PLATELET # BLD AUTO: 127 10(3)UL (ref 150–450)
POTASSIUM SERPL-SCNC: 4.2 MMOL/L (ref 3.5–5.1)
RBC # BLD AUTO: 4.69 X10(6)UL
SED RATE-ML: 10 MM/HR
SODIUM SERPL-SCNC: 141 MMOL/L (ref 136–145)
VIT D+METAB SERPL-MCNC: 30.4 NG/ML (ref 30–100)
WBC # BLD AUTO: 3.6 X10(3) UL (ref 4–11)

## 2021-02-01 PROCEDURE — 86140 C-REACTIVE PROTEIN: CPT

## 2021-02-01 PROCEDURE — 80053 COMPREHEN METABOLIC PANEL: CPT

## 2021-02-01 PROCEDURE — 36415 COLL VENOUS BLD VENIPUNCTURE: CPT

## 2021-02-01 PROCEDURE — 82306 VITAMIN D 25 HYDROXY: CPT

## 2021-02-01 PROCEDURE — 85652 RBC SED RATE AUTOMATED: CPT

## 2021-02-01 PROCEDURE — 85025 COMPLETE CBC W/AUTO DIFF WBC: CPT

## 2021-02-02 ENCOUNTER — TELEPHONE (OUTPATIENT)
Dept: RHEUMATOLOGY | Facility: CLINIC | Age: 66
End: 2021-02-02

## 2021-02-02 NOTE — TELEPHONE ENCOUNTER
Pt pharmacy called to clarify pt taking nabumetone and xareleto. DO aware. Prescribed for short term use.

## 2021-02-10 ENCOUNTER — OFFICE VISIT (OUTPATIENT)
Dept: HEMATOLOGY/ONCOLOGY | Facility: HOSPITAL | Age: 66
End: 2021-02-10
Attending: INTERNAL MEDICINE
Payer: COMMERCIAL

## 2021-02-10 VITALS
SYSTOLIC BLOOD PRESSURE: 147 MMHG | DIASTOLIC BLOOD PRESSURE: 80 MMHG | WEIGHT: 213 LBS | TEMPERATURE: 97 F | HEART RATE: 78 BPM | OXYGEN SATURATION: 98 % | RESPIRATION RATE: 18 BRPM | BODY MASS INDEX: 31 KG/M2

## 2021-02-10 DIAGNOSIS — D69.6 THROMBOCYTOPENIA (HCC): ICD-10-CM

## 2021-02-10 DIAGNOSIS — Z17.0 MALIGNANT NEOPLASM OF CENTRAL PORTION OF RIGHT BREAST IN MALE, ESTROGEN RECEPTOR POSITIVE (HCC): Primary | ICD-10-CM

## 2021-02-10 DIAGNOSIS — C61 PROSTATE CANCER (HCC): ICD-10-CM

## 2021-02-10 DIAGNOSIS — C50.121 MALIGNANT NEOPLASM OF CENTRAL PORTION OF RIGHT BREAST IN MALE, ESTROGEN RECEPTOR POSITIVE (HCC): Primary | ICD-10-CM

## 2021-02-10 PROCEDURE — 99213 OFFICE O/P EST LOW 20 MIN: CPT | Performed by: INTERNAL MEDICINE

## 2021-02-10 NOTE — PROGRESS NOTES
Cancer Center Progress Note    Problem List:      Patient Active Problem List:     Prostate cancer ([facility]     Pain in joint, ankle and foot     Mixed hyperlipidemia     Vitamin D deficiency     Insomnia     Hemangioma     Nephrolithiasis     Malignant neopl All other systems were negative.     PMH/PSH:  Past Medical History:   Diagnosis Date   • Actinic keratosis 5/5/2015    left wrist   • Actinic keratosis 11/6/2014    right forearm   • Actinic keratosis 4/8/2014    left neck   • Actinic keratosis 7/2/2013 ELBOW  6/20012    Dr Hakan Rowell at [facility] AT [facility]   • [facility]     • SHOULDER SURG PROC UNLISTED Bilateral 2004/2012    right shoulder/left   • TONSILLECTOMY         Family History Reviewed:  Family History   Problem Relation Age of Onset   • Breast Ca --  Pulse: 78 (02/10 0756)  BP: 147/80 (02/10 0756)  Temp: 97.2 °F (36.2 °C) (02/10 0756)  Do Not Use - Resp Rate: --  SpO2: 98 % (02/10 0756)      Performance Status:  ECOG 0: Fully active, able to carry on all pre-disease performance without restriction abnormality is seen. There is a metallic fracture involving a lateral fixation device. Correlation with any other prior images is suggested. If clinical symptoms persist then consider follow-up imaging.       US of liver with elasticity yesterday:  FINDING [place] 7 (3+4). He had 14 negative nodes. The margins were negative.  This was staged as T2c, N0.     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

## 2021-02-15 ENCOUNTER — HOSPITAL ENCOUNTER (OUTPATIENT)
Dept: ULTRASOUND IMAGING | Age: 66
Discharge: HOME | End: 2021-02-15
Attending: INTERNAL MEDICINE
Payer: COMMERCIAL

## 2021-02-15 ENCOUNTER — PATIENT MESSAGE (OUTPATIENT)
Dept: SURGERY | Facility: CLINIC | Age: 66
End: 2021-02-15

## 2021-02-15 DIAGNOSIS — K76.0 NAFLD (NONALCOHOLIC FATTY LIVER DISEASE): ICD-10-CM

## 2021-02-15 DIAGNOSIS — K76.0 NAFLD (NONALCOHOLIC FATTY LIVER DISEASE): Primary | ICD-10-CM

## 2021-02-15 PROCEDURE — 76981 USE PARENCHYMA: CPT | Performed by: INTERNAL MEDICINE

## 2021-02-15 PROCEDURE — 76705 ECHO EXAM OF ABDOMEN: CPT | Performed by: INTERNAL MEDICINE

## 2021-02-19 ENCOUNTER — TELEPHONE (OUTPATIENT)
Dept: FAMILY MEDICINE CLINIC | Facility: CLINIC | Age: 66
End: 2021-02-19

## 2021-02-19 DIAGNOSIS — Z86.018 HISTORY OF MYXOMA: Primary | ICD-10-CM

## 2021-02-20 NOTE — TELEPHONE ENCOUNTER
----- Message from Ian Villar MD sent at 2/19/2021  6:08 PM CST -----  Regarding: Cutaneous myxoma  Hi Dr. Anabel Pastrana, I saw Mr. Pamela Piper in my dermatology office on 2/12/2021 and biopsied a skin lesion on his left popliteal skin that demonstrated a cutan

## 2021-02-25 ENCOUNTER — HOSPITAL ENCOUNTER (OUTPATIENT)
Dept: CV DIAGNOSTICS | Facility: HOSPITAL | Age: 66
Discharge: HOME | End: 2021-02-25
Attending: FAMILY MEDICINE
Payer: COMMERCIAL

## 2021-02-25 DIAGNOSIS — Z86.018 HISTORY OF MYXOMA: ICD-10-CM

## 2021-02-25 PROCEDURE — 93306 TTE W/DOPPLER COMPLETE: CPT | Performed by: FAMILY MEDICINE

## 2021-03-03 DIAGNOSIS — Z23 NEED FOR VACCINATION: ICD-10-CM

## 2021-03-08 ENCOUNTER — OFFICE VISIT (OUTPATIENT)
Dept: FAMILY MEDICINE CLINIC | Facility: CLINIC | Age: 66
End: 2021-03-08
Payer: COMMERCIAL

## 2021-03-08 VITALS
HEART RATE: 90 BPM | RESPIRATION RATE: 22 BRPM | SYSTOLIC BLOOD PRESSURE: 138 MMHG | WEIGHT: 215 LBS | TEMPERATURE: 98 F | BODY MASS INDEX: 30.78 KG/M2 | HEIGHT: 70 IN | DIASTOLIC BLOOD PRESSURE: 88 MMHG

## 2021-03-08 DIAGNOSIS — M19.90 INFLAMMATORY ARTHRITIS: ICD-10-CM

## 2021-03-08 DIAGNOSIS — M25.532 BILATERAL WRIST PAIN: ICD-10-CM

## 2021-03-08 DIAGNOSIS — R30.0 DYSURIA: Primary | ICD-10-CM

## 2021-03-08 DIAGNOSIS — M25.50 POLYARTHRALGIA: ICD-10-CM

## 2021-03-08 DIAGNOSIS — M25.531 BILATERAL WRIST PAIN: ICD-10-CM

## 2021-03-08 LAB
MULTISTIX LOT#: 4043 NUMERIC
PH, URINE: 7 (ref 4.5–8)
SPECIFIC GRAVITY: 1.01 (ref 1–1.03)
URINE-COLOR: YELLOW
UROBILINOGEN,SEMI-QN: 0.2 MG/DL (ref 0–1.9)

## 2021-03-08 PROCEDURE — 99213 OFFICE O/P EST LOW 20 MIN: CPT | Performed by: FAMILY MEDICINE

## 2021-03-08 PROCEDURE — 3008F BODY MASS INDEX DOCD: CPT | Performed by: FAMILY MEDICINE

## 2021-03-08 PROCEDURE — 3079F DIAST BP 80-89 MM HG: CPT | Performed by: FAMILY MEDICINE

## 2021-03-08 PROCEDURE — 87086 URINE CULTURE/COLONY COUNT: CPT | Performed by: FAMILY MEDICINE

## 2021-03-08 PROCEDURE — 3075F SYST BP GE 130 - 139MM HG: CPT | Performed by: FAMILY MEDICINE

## 2021-03-08 PROCEDURE — 81003 URINALYSIS AUTO W/O SCOPE: CPT | Performed by: FAMILY MEDICINE

## 2021-03-08 PROCEDURE — 87088 URINE BACTERIA CULTURE: CPT | Performed by: FAMILY MEDICINE

## 2021-03-08 PROCEDURE — 87186 SC STD MICRODIL/AGAR DIL: CPT | Performed by: FAMILY MEDICINE

## 2021-03-08 RX ORDER — SULFAMETHOXAZOLE AND TRIMETHOPRIM 800; 160 MG/1; MG/1
1 TABLET ORAL 2 TIMES DAILY
Qty: 14 TABLET | Refills: 0 | Status: SHIPPED | OUTPATIENT
Start: 2021-03-08 | End: 2021-03-15

## 2021-03-08 RX ORDER — NABUMETONE 500 MG/1
500 TABLET, FILM COATED ORAL 2 TIMES DAILY WITH MEALS
Qty: 60 TABLET | Refills: 0 | Status: SHIPPED | OUTPATIENT
Start: 2021-03-08 | End: 2021-04-06

## 2021-03-09 NOTE — PROGRESS NOTES
HPI/Subjective: This is a 60year old male coming in for had concerns including UTI (started yesterday, this morning their was blood in the urine, burning moises feel, urine has a vary strong oder ) and Sore Throat (started the last few weeks ). .  Symptom Denis Session start on Sulfamethoxazole-TMP DS. I am also having him maintain his Vitamin D, Sildenafil Citrate, atorvastatin, Tamoxifen Citrate, Zolpidem Tartrate ER, and Nabumetone.    Return in about 4 weeks (around 4/5/2021) for Annual physical, as previou

## 2021-03-22 ENCOUNTER — HOSPITAL ENCOUNTER (OUTPATIENT)
Age: 66
Discharge: HOME | End: 2021-03-22
Payer: COMMERCIAL

## 2021-03-22 VITALS
RESPIRATION RATE: 14 BRPM | OXYGEN SATURATION: 97 % | HEART RATE: 80 BPM | SYSTOLIC BLOOD PRESSURE: 152 MMHG | DIASTOLIC BLOOD PRESSURE: 89 MMHG | TEMPERATURE: 98 F

## 2021-03-22 DIAGNOSIS — B37.0 THRUSH, ORAL: Primary | ICD-10-CM

## 2021-03-22 PROCEDURE — 99213 OFFICE O/P EST LOW 20 MIN: CPT | Performed by: PHYSICIAN ASSISTANT

## 2021-03-22 NOTE — ED PROVIDER NOTES
Patient Seen in: [facility] [place]      History   Patient presents with:  Sore Throat  Eye Problem: left eye    Stated Complaint: Sore throat 3 wks    HPI/Subjective:   HPI    Pleasant 28-year-old gentleman.   Currently receiving treatment for ri posterior neck; in-situ, right upper arm              Past Surgical History:   Procedure Laterality Date   • BREAST SENTINEL LYMPH NODE BIOPSY Right 12/15/2015    Performed by Jerald Cheney MD at [facility] MAIN OR   • COLONOSCOPY  12/15/2018   • COLONOSCOPY Well appearing, well groomed, alert and aware x 3  Neck: Supple, full range of motion, no thyromegaly or lymphadenopathy. Eye examination: EOMs are intact, normal conjunctival.  PERRLA bilaterally.   ENT: Generalized irritation/inflammation of the bucca mu

## 2021-03-22 NOTE — ED INITIAL ASSESSMENT (HPI)
Sore throat- x 3-4 weeks now. Pt saw PCP for UTI on March 8. Pt addressed it but no treatment . Pt was treated with bactrim for UTI. White spots noted on the left  Side throat.    Left eye-  pressure

## 2021-03-26 ENCOUNTER — OFFICE VISIT (OUTPATIENT)
Dept: FAMILY MEDICINE CLINIC | Facility: CLINIC | Age: 66
End: 2021-03-26
Payer: COMMERCIAL

## 2021-03-26 VITALS
TEMPERATURE: 98 F | HEART RATE: 86 BPM | WEIGHT: 210.38 LBS | DIASTOLIC BLOOD PRESSURE: 70 MMHG | HEIGHT: 69 IN | BODY MASS INDEX: 31.16 KG/M2 | RESPIRATION RATE: 18 BRPM | SYSTOLIC BLOOD PRESSURE: 138 MMHG

## 2021-03-26 DIAGNOSIS — B37.0 THRUSH: Primary | ICD-10-CM

## 2021-03-26 PROCEDURE — 3075F SYST BP GE 130 - 139MM HG: CPT | Performed by: FAMILY MEDICINE

## 2021-03-26 PROCEDURE — 3008F BODY MASS INDEX DOCD: CPT | Performed by: FAMILY MEDICINE

## 2021-03-26 PROCEDURE — 99213 OFFICE O/P EST LOW 20 MIN: CPT | Performed by: FAMILY MEDICINE

## 2021-03-26 PROCEDURE — 3078F DIAST BP <80 MM HG: CPT | Performed by: FAMILY MEDICINE

## 2021-03-26 RX ORDER — FLUCONAZOLE 100 MG/1
100 TABLET ORAL DAILY
Qty: 5 TABLET | Refills: 0 | Status: SHIPPED | OUTPATIENT
Start: 2021-03-26 | End: 2021-04-07

## 2021-03-26 NOTE — PROGRESS NOTES
La Bajwa is a 60year old male coming in for had concerns including Yeast Infection (follow up on oral thrush ) and Sore Throat (started a month ago, feels like he has something stuck around his throat, and has pains on his chest ).     HPI/Subjective:   Yeast I instead of liquid to help with this. Unclear source.    Recent Results (from the past 8760 hour(s))   CBC W/ DIFFERENTIAL    Collection Time: 02/01/21  8:01 AM   Result Value Ref Range    WBC 3.6 (L) 4.0 - 11.0 x10(3) uL    RBC 4.69 4.30 - 5.70 x10(6)uL

## 2021-04-06 DIAGNOSIS — M25.50 POLYARTHRALGIA: ICD-10-CM

## 2021-04-06 DIAGNOSIS — M25.531 BILATERAL WRIST PAIN: ICD-10-CM

## 2021-04-06 DIAGNOSIS — M25.532 BILATERAL WRIST PAIN: ICD-10-CM

## 2021-04-06 DIAGNOSIS — M19.90 INFLAMMATORY ARTHRITIS: ICD-10-CM

## 2021-04-06 NOTE — ASSESSMENT & PLAN NOTE
Stable continue present management Last Platelet value was 551 done 2/1/2021. Lowest level in the last 10 years was 119.

## 2021-04-07 RX ORDER — NABUMETONE 500 MG/1
500 TABLET, FILM COATED ORAL 2 TIMES DAILY WITH MEALS
Qty: 60 TABLET | Refills: 0 | Status: SHIPPED | OUTPATIENT
Start: 2021-04-07 | End: 2021-05-09

## 2021-04-07 NOTE — TELEPHONE ENCOUNTER
LOV 1-26-21 virtual  Future Appointments   Date Time Provider Magdaleno Atwood   4/27/2021  1:50 PM Garth Padgett MD [id number] [address] [facility]   8/6/2021  8:30 AM Edmar Murcia MD [facility]   9/1/2021  8:00 AM Nicole Ann MD [facility]

## 2021-04-07 NOTE — PROGRESS NOTES
Roma Bullock is a 60year old male coming in for had concerns including Follow - Up (6  month check up ). HPI/Subjective:   HPI tamoxifen to be completed with 5 year timoteo. Stable in lipotr, needs ambien. Got covid vaccine, still withn thrush symptoms.  We did oral I am also having him maintain his Vitamin D, Sildenafil Citrate, atorvastatin, Tamoxifen Citrate, Nabumetone, and Zolpidem Tartrate ER. No follow-ups on file.     Tiffany Ballesteros MD, 4/7/2021, 8:01 AM

## 2021-05-09 DIAGNOSIS — M25.532 BILATERAL WRIST PAIN: ICD-10-CM

## 2021-05-09 DIAGNOSIS — M25.50 POLYARTHRALGIA: ICD-10-CM

## 2021-05-09 DIAGNOSIS — M19.90 INFLAMMATORY ARTHRITIS: ICD-10-CM

## 2021-05-09 DIAGNOSIS — M25.531 BILATERAL WRIST PAIN: ICD-10-CM

## 2021-05-10 ENCOUNTER — TELEPHONE (OUTPATIENT)
Dept: RHEUMATOLOGY | Facility: CLINIC | Age: 66
End: 2021-05-10

## 2021-05-10 RX ORDER — NABUMETONE 500 MG/1
500 TABLET, FILM COATED ORAL 2 TIMES DAILY WITH MEALS
Qty: 60 TABLET | Refills: 0 | Status: SHIPPED | OUTPATIENT
Start: 2021-05-10 | End: 2021-06-15

## 2021-05-10 NOTE — TELEPHONE ENCOUNTER
Called pt     On DOAC from Nov 3-mid December, 2020. This now. Will refill nabumetone 500 mg twice daily    Some GERD, symptoms. On D1ynapizl. I recommend taking nabumetone after meals.

## 2021-06-10 ENCOUNTER — OFFICE VISIT (OUTPATIENT)
Dept: RHEUMATOLOGY | Facility: CLINIC | Age: 66
End: 2021-06-10
Payer: COMMERCIAL

## 2021-06-10 VITALS
DIASTOLIC BLOOD PRESSURE: 80 MMHG | WEIGHT: 215 LBS | TEMPERATURE: 98 F | SYSTOLIC BLOOD PRESSURE: 148 MMHG | HEIGHT: 70 IN | HEART RATE: 88 BPM | BODY MASS INDEX: 30.78 KG/M2 | RESPIRATION RATE: 16 BRPM

## 2021-06-10 DIAGNOSIS — M67.40 GANGLION CYST: ICD-10-CM

## 2021-06-10 DIAGNOSIS — M19.90 INFLAMMATORY ARTHRITIS: ICD-10-CM

## 2021-06-10 DIAGNOSIS — Z51.81 THERAPEUTIC DRUG MONITORING: Primary | ICD-10-CM

## 2021-06-10 DIAGNOSIS — M25.532 PAIN AND SWELLING OF LEFT WRIST: ICD-10-CM

## 2021-06-10 DIAGNOSIS — Z11.1 SCREENING FOR TUBERCULOSIS: ICD-10-CM

## 2021-06-10 DIAGNOSIS — M06.4 INFLAMMATORY POLYARTHRITIS (HCC): ICD-10-CM

## 2021-06-10 DIAGNOSIS — M25.432 PAIN AND SWELLING OF LEFT WRIST: ICD-10-CM

## 2021-06-10 DIAGNOSIS — Z11.59 NEED FOR HEPATITIS B SCREENING TEST: ICD-10-CM

## 2021-06-10 DIAGNOSIS — Z11.59 NEED FOR HEPATITIS C SCREENING TEST: ICD-10-CM

## 2021-06-10 PROCEDURE — 3008F BODY MASS INDEX DOCD: CPT | Performed by: INTERNAL MEDICINE

## 2021-06-10 PROCEDURE — 3077F SYST BP >= 140 MM HG: CPT | Performed by: INTERNAL MEDICINE

## 2021-06-10 PROCEDURE — 99214 OFFICE O/P EST MOD 30 MIN: CPT | Performed by: INTERNAL MEDICINE

## 2021-06-10 PROCEDURE — 3079F DIAST BP 80-89 MM HG: CPT | Performed by: INTERNAL MEDICINE

## 2021-06-10 NOTE — PROGRESS NOTES
Rheumatology Follow-up Note  =====================================================================================================      Date of visit: 6/10/2021  ? Patient presents with: Follow - Up: previous video visit of dr. Vera Philip pt.  Still have Past Medical History:  Past Medical History:   Diagnosis Date   • Actinic keratosis 5/5/2015    left wrist   • Actinic keratosis 11/6/2014    right forearm   • Actinic keratosis 4/8/2014    left neck   • Actinic keratosis 7/2/2013    left forearm   • A ZACH,CHRONIC     • SHOULDER SURG PROC UNLISTED Bilateral 2004/2012    right shoulder/left   • TONSILLECTOMY       Family History:  Family History   Problem Relation Age of Onset   • Breast Cancer Sister 48        uterine ca after tamoxifen tx (dx 47)   • C noted. Full ROM of elbows, wrists, knees. TTP in b/l flexor tendons of wrists, painful flexion of wrists. Pain at flexor tendon at wrist with passive extension of wrists. Nodule at wrist extensors of left wrist   Nodule of L wrist  TTP in elbows.  TTP ANTIRNP, SMITHRNP  No results found for: SCL70, SCL, UDOQQKU25  No results found for: C3, C4  No results found for: DRVVT, LAINT, PTTLUPUS, LUPUSINTERP, LA, H7PR9TQHLI, X0QY3XMCOS, W6KONCQASB, R0FLJZIJSN  No results found for: ABDIAS Cordero effusions at this region. History of psoriasis which is quiet today. Inflammatory arthritis/enthesitis continues to be active. DDx: reactive arthritis, psoriatic arthritis.      High risk medication labs including CMP and CBC w/ diff reviewed from 2/2 referring provider, PCP, and/or the patient's other physicians relevant to this encounter was sent. Thank you for referring this delightful patient to me. Please feel free to contact me with any questions.      This report was performed utilizing speech

## 2021-06-10 NOTE — PATIENT INSTRUCTIONS
1. Get bloodwork. 2. Schedule ultrasound of L wrist. [facility] scheduling line to set the appointment up. Phone number is [phone]. The [facility] ([facility] lower cost imaging option) scheduling number is [phone].  It is on 20

## 2021-06-14 ENCOUNTER — LAB ENCOUNTER (OUTPATIENT)
Dept: LAB | Age: 66
End: 2021-06-14
Attending: INTERNAL MEDICINE
Payer: COMMERCIAL

## 2021-06-14 DIAGNOSIS — M25.50 POLYARTHRALGIA: ICD-10-CM

## 2021-06-14 DIAGNOSIS — M25.532 BILATERAL WRIST PAIN: ICD-10-CM

## 2021-06-14 DIAGNOSIS — M19.90 INFLAMMATORY ARTHRITIS: ICD-10-CM

## 2021-06-14 DIAGNOSIS — M25.531 BILATERAL WRIST PAIN: ICD-10-CM

## 2021-06-14 DIAGNOSIS — Z51.81 THERAPEUTIC DRUG MONITORING: ICD-10-CM

## 2021-06-14 DIAGNOSIS — Z11.59 NEED FOR HEPATITIS B SCREENING TEST: ICD-10-CM

## 2021-06-14 DIAGNOSIS — Z11.1 SCREENING FOR TUBERCULOSIS: Primary | ICD-10-CM

## 2021-06-14 DIAGNOSIS — Z11.59 NEED FOR HEPATITIS C SCREENING TEST: ICD-10-CM

## 2021-06-14 PROCEDURE — 86706 HEP B SURFACE ANTIBODY: CPT | Performed by: INTERNAL MEDICINE

## 2021-06-14 PROCEDURE — 86803 HEPATITIS C AB TEST: CPT | Performed by: INTERNAL MEDICINE

## 2021-06-14 PROCEDURE — 85652 RBC SED RATE AUTOMATED: CPT | Performed by: INTERNAL MEDICINE

## 2021-06-14 PROCEDURE — 80053 COMPREHEN METABOLIC PANEL: CPT | Performed by: INTERNAL MEDICINE

## 2021-06-14 PROCEDURE — 86480 TB TEST CELL IMMUN MEASURE: CPT | Performed by: INTERNAL MEDICINE

## 2021-06-14 PROCEDURE — 86704 HEP B CORE ANTIBODY TOTAL: CPT | Performed by: INTERNAL MEDICINE

## 2021-06-14 PROCEDURE — 87340 HEPATITIS B SURFACE AG IA: CPT | Performed by: INTERNAL MEDICINE

## 2021-06-14 PROCEDURE — 82955 ASSAY OF G6PD ENZYME: CPT | Performed by: INTERNAL MEDICINE

## 2021-06-14 PROCEDURE — 85025 COMPLETE CBC W/AUTO DIFF WBC: CPT | Performed by: INTERNAL MEDICINE

## 2021-06-14 PROCEDURE — 86140 C-REACTIVE PROTEIN: CPT | Performed by: INTERNAL MEDICINE

## 2021-06-15 ENCOUNTER — TELEPHONE (OUTPATIENT)
Dept: RHEUMATOLOGY | Facility: CLINIC | Age: 66
End: 2021-06-15

## 2021-06-15 ENCOUNTER — OFFICE VISIT (OUTPATIENT)
Dept: FAMILY MEDICINE CLINIC | Facility: CLINIC | Age: 66
End: 2021-06-15
Payer: COMMERCIAL

## 2021-06-15 VITALS
DIASTOLIC BLOOD PRESSURE: 88 MMHG | WEIGHT: 211.19 LBS | TEMPERATURE: 99 F | SYSTOLIC BLOOD PRESSURE: 136 MMHG | BODY MASS INDEX: 31.28 KG/M2 | HEART RATE: 76 BPM | RESPIRATION RATE: 12 BRPM | HEIGHT: 69 IN

## 2021-06-15 DIAGNOSIS — R05.3 PERSISTENT COUGH: ICD-10-CM

## 2021-06-15 DIAGNOSIS — M25.532 BILATERAL WRIST PAIN: ICD-10-CM

## 2021-06-15 DIAGNOSIS — M25.531 BILATERAL WRIST PAIN: ICD-10-CM

## 2021-06-15 DIAGNOSIS — M19.90 INFLAMMATORY ARTHRITIS: ICD-10-CM

## 2021-06-15 DIAGNOSIS — R03.0 ELEVATED BLOOD PRESSURE READING WITHOUT DIAGNOSIS OF HYPERTENSION: Primary | ICD-10-CM

## 2021-06-15 DIAGNOSIS — M25.50 POLYARTHRALGIA: ICD-10-CM

## 2021-06-15 DIAGNOSIS — K21.9 GASTROESOPHAGEAL REFLUX DISEASE, UNSPECIFIED WHETHER ESOPHAGITIS PRESENT: ICD-10-CM

## 2021-06-15 PROCEDURE — 3008F BODY MASS INDEX DOCD: CPT | Performed by: PHYSICIAN ASSISTANT

## 2021-06-15 PROCEDURE — 99214 OFFICE O/P EST MOD 30 MIN: CPT | Performed by: PHYSICIAN ASSISTANT

## 2021-06-15 PROCEDURE — 3079F DIAST BP 80-89 MM HG: CPT | Performed by: PHYSICIAN ASSISTANT

## 2021-06-15 PROCEDURE — 3075F SYST BP GE 130 - 139MM HG: CPT | Performed by: PHYSICIAN ASSISTANT

## 2021-06-15 RX ORDER — NABUMETONE 500 MG/1
500 TABLET, FILM COATED ORAL 2 TIMES DAILY WITH MEALS
Qty: 60 TABLET | Refills: 0 | Status: SHIPPED | OUTPATIENT
Start: 2021-06-15 | End: 2021-07-03

## 2021-06-15 NOTE — PATIENT INSTRUCTIONS
Check BP at home    Let me know in 1-2 weeks if omeprazole not helping cough and we will get chest x-ray. Try cubital tunnel brace for your left elbow. Try to avoid direct pressure onto elbow.

## 2021-06-15 NOTE — PROGRESS NOTES
Patient presents with:  Blood Pressure: f/u visit rheumatologist.BP high at that visit. HISTORY OF PRESENT ILLNESS  Rashi Vann is a 60year old male who presents for evaluation of few concerns.  He has been seeing specialists for a number of issu Thrombocytopenia ([address]     ED (erectile dysfunction) of organic origin     PEDRO (stress urinary incontinence), male     Psoriasis     NAFLD (nonalcoholic fatty liver disease)      Past Surgical History:   Procedure Laterality Date   • Colonoscopy N/A 12/5/2 Surface Antigen 06/14/2021 Nonreactive   Nonreactive  Final   • Hbsag Screen Index 06/14/2021 <0.10   Final   • Hepatitis C Virus 06/14/2021 Nonreactive   Nonreactive  Final   • Hepatitis B Core Ab,Total 06/14/2021 Nonreactive   Nonreactive  Final   • Hepa pg Final   • MCHC 06/14/2021 35.1  31.0 - 37.0 g/dL Final   • RDW 06/14/2021 13.3  11.0 - 15.0 % Final   • RDW-SD 06/14/2021 45.7  35.1 - 46.3 fL Final   • Neutrophil Absolute Prelim 06/14/2021 1.63  1.50 - 7.70 x10 (3) uL Final   • Neutrophil Absolute 06/

## 2021-06-15 NOTE — TELEPHONE ENCOUNTER
Hello can you let the patient know that the G6PD level is still in process. Should be back by the end of the week. Cannot start the sulfasalazine until this is back.   If he needs a new refill of the nabumetone we can provide him for 1 month as we are wait

## 2021-06-15 NOTE — TELEPHONE ENCOUNTER
Pt phoned office, spoke with Dr. Lexus Caraballo at [address] [place] [address] about switching medication to sulfasalazine. Pt would like to start medication. Completed nabumetone as of today.

## 2021-06-16 ENCOUNTER — TELEPHONE (OUTPATIENT)
Dept: RHEUMATOLOGY | Facility: CLINIC | Age: 66
End: 2021-06-16

## 2021-06-16 DIAGNOSIS — M47.819 SPONDYLOARTHRITIS: ICD-10-CM

## 2021-06-16 DIAGNOSIS — Z51.81 THERAPEUTIC DRUG MONITORING: Primary | ICD-10-CM

## 2021-06-16 DIAGNOSIS — M19.90 INFLAMMATORY ARTHRITIS: ICD-10-CM

## 2021-06-16 RX ORDER — SULFASALAZINE 500 MG/1
TABLET ORAL
Qty: 325 TABLET | Refills: 0 | Status: SHIPPED | OUTPATIENT
Start: 2021-06-16 | End: 2021-06-17 | Stop reason: SDUPTHER

## 2021-06-16 NOTE — TELEPHONE ENCOUNTER
Please call patient, G6PD normal.  Up titration of sulfasalazine as noted below. After 1 month we will need to get repeat blood work to check that blood counts and liver are okay.     Orders Placed This Encounter      Comp Metabolic Panel (14)          S

## 2021-06-16 NOTE — TELEPHONE ENCOUNTER
Received message from Sundeep Gongora. Sulfasalazine on backorder. Can you see what preferred mail order pharmacy this patient has?   Please forward the prescription to that pharmacy

## 2021-06-17 ENCOUNTER — TELEPHONE (OUTPATIENT)
Dept: RHEUMATOLOGY | Facility: CLINIC | Age: 66
End: 2021-06-17

## 2021-06-17 DIAGNOSIS — M25.832 MASS OF JOINT OF LEFT WRIST: Primary | ICD-10-CM

## 2021-06-17 DIAGNOSIS — M67.432 GANGLION CYST OF WRIST, LEFT: ICD-10-CM

## 2021-06-17 RX ORDER — SULFASALAZINE 500 MG/1
TABLET ORAL
Qty: 325 TABLET | Refills: 0 | Status: SHIPPED | OUTPATIENT
Start: 2021-06-17 | End: 2021-08-06

## 2021-06-17 NOTE — TELEPHONE ENCOUNTER
Pharmacy phoned office sulfaSALAzine 500 MG Oral Tab backordered until possibly July.     Will forward to Dr. Nyasia Pak

## 2021-06-17 NOTE — TELEPHONE ENCOUNTER
Pt returned my call, notified G6PD normal.  Up titration of sulfasalazine as noted below. Pt pharmacy, sulfasalazine backordered. Has [employer]-- would use the [employer] mail order.       Reminded pt to have his labs completed 1 month to check that blood counts and li

## 2021-06-18 ENCOUNTER — HOSPITAL ENCOUNTER (OUTPATIENT)
Dept: ULTRASOUND IMAGING | Facility: HOSPITAL | Age: 66
Discharge: HOME | End: 2021-06-18
Attending: INTERNAL MEDICINE
Payer: COMMERCIAL

## 2021-06-18 DIAGNOSIS — M25.532 PAIN AND SWELLING OF LEFT WRIST: ICD-10-CM

## 2021-06-18 DIAGNOSIS — M25.432 PAIN AND SWELLING OF LEFT WRIST: ICD-10-CM

## 2021-06-18 DIAGNOSIS — M19.90 INFLAMMATORY ARTHRITIS: ICD-10-CM

## 2021-06-18 DIAGNOSIS — M67.40 GANGLION CYST: ICD-10-CM

## 2021-06-18 PROCEDURE — 76881 US COMPL JOINT R-T W/IMG: CPT | Performed by: INTERNAL MEDICINE

## 2021-06-18 NOTE — TELEPHONE ENCOUNTER
Called and left voicemail to patient. Probable complex ganglion cyst however cannot be certain so will need an MRI. This will be ordered.

## 2021-06-18 NOTE — TELEPHONE ENCOUNTER
Phoned pt, notified he will be hearing from [facility] mail order for delivery of his sulfasalazine. Reviewed directions, and reminded to repeat labs in one month. Pt voiced understanding.

## 2021-06-21 ENCOUNTER — DOCUMENTATION ONLY (OUTPATIENT)
Dept: RHEUMATOLOGY | Facility: CLINIC | Age: 66
End: 2021-06-21

## 2021-06-21 NOTE — PROGRESS NOTES
PA initiated for MRI left wrist with [employer] of [place]; no PA required by [employer]. Pt is already scheduled.

## 2021-06-25 ENCOUNTER — HOSPITAL ENCOUNTER (OUTPATIENT)
Dept: MRI IMAGING | Age: 66
Discharge: HOME | End: 2021-06-25
Attending: INTERNAL MEDICINE
Payer: COMMERCIAL

## 2021-06-25 DIAGNOSIS — M67.432 GANGLION CYST OF WRIST, LEFT: ICD-10-CM

## 2021-06-25 DIAGNOSIS — M25.832 MASS OF JOINT OF LEFT WRIST: ICD-10-CM

## 2021-06-25 PROCEDURE — A9575 INJ GADOTERATE MEGLUMI 0.1ML: HCPCS | Performed by: INTERNAL MEDICINE

## 2021-06-25 PROCEDURE — 73223 MRI JOINT UPR EXTR W/O&W/DYE: CPT | Performed by: INTERNAL MEDICINE

## 2021-06-29 ENCOUNTER — TELEPHONE (OUTPATIENT)
Dept: RHEUMATOLOGY | Facility: CLINIC | Age: 66
End: 2021-06-29

## 2021-06-29 NOTE — TELEPHONE ENCOUNTER
Discussed MRI with Dr. Nicolas Roger from [facility] radiology. Subchondral cysts likely from degenerative changes. There is a ganglion cyst abutting that general area. I relayed the findings to the patient. No evidence of active synovitis.   The patient will discuss

## 2021-07-03 DIAGNOSIS — M25.531 BILATERAL WRIST PAIN: ICD-10-CM

## 2021-07-03 DIAGNOSIS — M25.50 POLYARTHRALGIA: ICD-10-CM

## 2021-07-03 DIAGNOSIS — M19.90 INFLAMMATORY ARTHRITIS: ICD-10-CM

## 2021-07-03 DIAGNOSIS — M25.532 BILATERAL WRIST PAIN: ICD-10-CM

## 2021-07-05 RX ORDER — NABUMETONE 500 MG/1
500 TABLET, FILM COATED ORAL 2 TIMES DAILY WITH MEALS
Qty: 60 TABLET | Refills: 0 | Status: SHIPPED | OUTPATIENT
Start: 2021-07-05 | End: 2021-07-26

## 2021-07-05 NOTE — TELEPHONE ENCOUNTER
Future Appointments   Date Time Provider Magdaleno Atwood   7/8/2021  1:00 PM SONAL NURSE [facility]   9/1/2021  8:00 AM Kylee Forbes MD [facility]   9/3/2021  8:30 AM Stu Dixon MD [facility] Surg/Onc

## 2021-07-12 DIAGNOSIS — E78.2 MIXED HYPERLIPIDEMIA: ICD-10-CM

## 2021-07-12 RX ORDER — ATORVASTATIN CALCIUM 40 MG/1
40 TABLET, FILM COATED ORAL
Qty: 30 TABLET | Refills: 11 | Status: CANCELLED | OUTPATIENT
Start: 2021-07-12

## 2021-07-13 ENCOUNTER — TELEPHONE (OUTPATIENT)
Dept: RHEUMATOLOGY | Facility: CLINIC | Age: 66
End: 2021-07-13

## 2021-07-13 DIAGNOSIS — E78.2 MIXED HYPERLIPIDEMIA: ICD-10-CM

## 2021-07-13 RX ORDER — ATORVASTATIN CALCIUM 40 MG/1
40 TABLET, FILM COATED ORAL
Qty: 30 TABLET | Refills: 11 | Status: CANCELLED | OUTPATIENT
Start: 2021-07-13

## 2021-07-13 NOTE — TELEPHONE ENCOUNTER
Pt called saying he was trying to get his sulfasalazine from [facility], but was unsuccessful. We switched it to a mailorder and he has been waiting a month. Is there something else we can do?

## 2021-07-13 NOTE — TELEPHONE ENCOUNTER
Unfortunately given the supplies shortages of sulfasalazine patient can get this medication. Methotrexate may be the next best step. Need to get okay from Dr. Romeo Prasad regarding the patient's liver disease before prescribing the medication.

## 2021-07-15 DIAGNOSIS — E78.2 MIXED HYPERLIPIDEMIA: ICD-10-CM

## 2021-07-16 RX ORDER — ATORVASTATIN CALCIUM 40 MG/1
TABLET, FILM COATED ORAL
Qty: 90 TABLET | Refills: 0 | Status: SHIPPED | OUTPATIENT
Start: 2021-07-16 | End: 2021-09-29

## 2021-07-16 NOTE — TELEPHONE ENCOUNTER
Refill request for Atorvastatin  LOV 6/5/21  Last labs 8/5/20  Next appt- physical 9/29/21  Refilled per protocol

## 2021-07-26 ENCOUNTER — TELEPHONE (OUTPATIENT)
Dept: RHEUMATOLOGY | Facility: CLINIC | Age: 66
End: 2021-07-26

## 2021-07-26 DIAGNOSIS — M47.819 SPONDYLOARTHRITIS: ICD-10-CM

## 2021-07-26 DIAGNOSIS — M19.90 INFLAMMATORY ARTHRITIS: Primary | ICD-10-CM

## 2021-07-26 DIAGNOSIS — M25.532 BILATERAL WRIST PAIN: ICD-10-CM

## 2021-07-26 DIAGNOSIS — M25.531 BILATERAL WRIST PAIN: ICD-10-CM

## 2021-07-26 DIAGNOSIS — M25.50 POLYARTHRALGIA: ICD-10-CM

## 2021-07-26 RX ORDER — METHOTREXATE 2.5 MG/1
TABLET ORAL
Qty: 50 TABLET | Refills: 0 | Status: SHIPPED | OUTPATIENT
Start: 2021-07-26 | End: 2021-09-28

## 2021-07-26 RX ORDER — FOLIC ACID 1 MG/1
1 TABLET ORAL DAILY
Qty: 90 TABLET | Refills: 1 | Status: SHIPPED | OUTPATIENT
Start: 2021-07-26 | End: 2022-01-26

## 2021-07-26 RX ORDER — NABUMETONE 500 MG/1
500 TABLET, FILM COATED ORAL 2 TIMES DAILY WITH MEALS
Qty: 60 TABLET | Refills: 0 | Status: SHIPPED | OUTPATIENT
Start: 2021-07-26 | End: 2021-12-20

## 2021-07-26 NOTE — TELEPHONE ENCOUNTER
Called patient. Still waiting on Dr. Mckay Credit response. Given elastography of liver with only mild fibrosis F1, this is pretty mild NAFLD so okay to start with methotrexate for the time being closely monitor with repeat blood work in 1 month.   Patient aske

## 2021-08-02 RX ORDER — SULFASALAZINE 500 MG/1
TABLET ORAL
Qty: 325 TABLET | Refills: 0 | Status: SHIPPED | OUTPATIENT
Start: 2021-08-02 | End: 2021-12-16

## 2021-08-02 NOTE — TELEPHONE ENCOUNTER
Patient should start taking the sulfasalazine as previously prescribed and instructed 1 week after methotrexate. So next Sunday he can start on the sulfasalazine. Dr. Noemi Stone would prefer sulfasalazine of her methotrexate long-term in terms of the liver.

## 2021-08-02 NOTE — TELEPHONE ENCOUNTER
Pt called stating he is at [facility] in [address] [facility] and they have Sulfasalazine in stock. He reports taking first dose of Methotrexate 10mg yesterday. Which is a better option for him? Please advise.   Pt is aware Dr Kacey Moon is in clinic currently and will addr

## 2021-08-02 NOTE — TELEPHONE ENCOUNTER
Called pt to review Dr Loya Adjutant notes below. He verbalized understanding and agrees to this plan.

## 2021-09-03 ENCOUNTER — OFFICE VISIT (OUTPATIENT)
Dept: SURGERY | Facility: CLINIC | Age: 66
End: 2021-09-03
Payer: COMMERCIAL

## 2021-09-03 VITALS
HEART RATE: 78 BPM | OXYGEN SATURATION: 97 % | BODY MASS INDEX: 30.72 KG/M2 | RESPIRATION RATE: 18 BRPM | SYSTOLIC BLOOD PRESSURE: 135 MMHG | DIASTOLIC BLOOD PRESSURE: 88 MMHG | HEIGHT: 69 IN | TEMPERATURE: 97 F | WEIGHT: 207.38 LBS

## 2021-09-03 DIAGNOSIS — Z17.0 MALIGNANT NEOPLASM OF CENTRAL PORTION OF RIGHT BREAST IN MALE, ESTROGEN RECEPTOR POSITIVE (HCC): Primary | ICD-10-CM

## 2021-09-03 DIAGNOSIS — C50.121 MALIGNANT NEOPLASM OF CENTRAL PORTION OF RIGHT BREAST IN MALE, ESTROGEN RECEPTOR POSITIVE (HCC): Primary | ICD-10-CM

## 2021-09-03 DIAGNOSIS — Z12.31 ENCOUNTER FOR SCREENING MAMMOGRAM FOR MALIGNANT NEOPLASM OF BREAST: ICD-10-CM

## 2021-09-03 PROCEDURE — 3008F BODY MASS INDEX DOCD: CPT | Performed by: SURGERY

## 2021-09-03 PROCEDURE — 3079F DIAST BP 80-89 MM HG: CPT | Performed by: SURGERY

## 2021-09-03 PROCEDURE — 3075F SYST BP GE 130 - 139MM HG: CPT | Performed by: SURGERY

## 2021-09-03 PROCEDURE — 99214 OFFICE O/P EST MOD 30 MIN: CPT | Performed by: SURGERY

## 2021-09-03 NOTE — PROGRESS NOTES
Breast Surgery Follow-Up Visit    Diagnosis: Infiltrating Ductal Carcinoma, right breast; ER positive, DC positive status post modified radical mastectomy without reconstruction on December 15, 2015.     Stage: [id number] (Stage IB)    Disease Status:  Ashley Moreno kidney    • Hyperlipidemia    • Lymphedema of arm     right; wears sleeve   • FRIEDMAN (nonalcoholic steatohepatitis)     Dr Hailee Hernandez   • Personal history of arthritis    • Prostate cancer [facility]) 1/2015    [facility]   • Squamous cell carcinoma 2/4/2016 Tab, Take 1 tablet (1 mg total) by mouth daily. , Disp: 90 tablet, Rfl: 1  Nabumetone 500 MG Oral Tab, Take 1 tablet (500 mg total) by mouth 2 (two) times daily with meals. , Disp: 60 tablet, Rfl: 0  ATORVASTATIN 40 MG Oral Tab, TAKE 1 TABLET BY MOUTH ONE TI a week        Smoking status: Never Smoker   Smokeless tobacco: Never Used   The patient is . He has 3 children. 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     Review of Systems:  General:   The patient denies, fever, nodes.     Musculoskeletal:  The patient denies muscle aches/pain, +joint pain, stiff joints, neck pain, back pain or bone pain.     Neuropsychiatric:  There is no history of migraines or severe headaches, seizure/epilepsy, speech problems, coordination pro He is benefiting from compression therapy and the flexitouch device in light of his persistent stiffness/swelling. His Oncotype Dx was reviewed by Dr. Waylon Dotson who has recommended Tamoxifen therapy only.  We also discussed that in light of his normal genetic te

## 2021-09-09 ENCOUNTER — TELEPHONE (OUTPATIENT)
Dept: RHEUMATOLOGY | Facility: CLINIC | Age: 66
End: 2021-09-09

## 2021-09-09 ENCOUNTER — LAB ENCOUNTER (OUTPATIENT)
Dept: LAB | Age: 66
End: 2021-09-09
Attending: INTERNAL MEDICINE
Payer: COMMERCIAL

## 2021-09-09 DIAGNOSIS — R17 ELEVATED BILIRUBIN: Primary | ICD-10-CM

## 2021-09-09 DIAGNOSIS — M19.90 INFLAMMATORY ARTHRITIS: ICD-10-CM

## 2021-09-09 DIAGNOSIS — R17 ELEVATED BILIRUBIN: ICD-10-CM

## 2021-09-09 DIAGNOSIS — E80.6 INDIRECT HYPERBILIRUBINEMIA: ICD-10-CM

## 2021-09-09 DIAGNOSIS — Z51.81 THERAPEUTIC DRUG MONITORING: ICD-10-CM

## 2021-09-09 LAB
ALBUMIN SERPL-MCNC: 4.1 G/DL (ref 3.4–5)
ALBUMIN/GLOB SERPL: 1.3 {RATIO} (ref 1–2)
ALP LIVER SERPL-CCNC: 79 U/L
ALT SERPL-CCNC: 48 U/L
ANION GAP SERPL CALC-SCNC: 6 MMOL/L (ref 0–18)
AST SERPL-CCNC: 24 U/L (ref 15–37)
BASOPHILS # BLD AUTO: 0.02 X10(3) UL (ref 0–0.2)
BASOPHILS NFR BLD AUTO: 0.5 %
BILIRUB DIRECT SERPL-MCNC: 0.3 MG/DL (ref 0–0.2)
BILIRUB SERPL-MCNC: 3.1 MG/DL (ref 0.1–2)
BUN BLD-MCNC: 12 MG/DL (ref 7–18)
CALCIUM BLD-MCNC: 9 MG/DL (ref 8.5–10.1)
CHLORIDE SERPL-SCNC: 107 MMOL/L (ref 98–112)
CO2 SERPL-SCNC: 26 MMOL/L (ref 21–32)
CREAT BLD-MCNC: 1.13 MG/DL
EOSINOPHIL # BLD AUTO: 0.06 X10(3) UL (ref 0–0.7)
EOSINOPHIL NFR BLD AUTO: 1.6 %
ERYTHROCYTE [DISTWIDTH] IN BLOOD BY AUTOMATED COUNT: 13.6 %
GLOBULIN PLAS-MCNC: 3.1 G/DL (ref 2.8–4.4)
GLUCOSE BLD-MCNC: 95 MG/DL (ref 70–99)
HCT VFR BLD AUTO: 45.1 %
HGB BLD-MCNC: 15.6 G/DL
IMM GRANULOCYTES # BLD AUTO: 0.02 X10(3) UL (ref 0–1)
IMM GRANULOCYTES NFR BLD: 0.5 %
LYMPHOCYTES # BLD AUTO: 0.79 X10(3) UL (ref 1–4)
LYMPHOCYTES NFR BLD AUTO: 21.7 %
M PROTEIN MFR SERPL ELPH: 7.2 G/DL (ref 6.4–8.2)
MCH RBC QN AUTO: 32.8 PG (ref 26–34)
MCHC RBC AUTO-ENTMCNC: 34.6 G/DL (ref 31–37)
MCV RBC AUTO: 94.7 FL
MONOCYTES # BLD AUTO: 0.51 X10(3) UL (ref 0.1–1)
MONOCYTES NFR BLD AUTO: 14 %
NEUTROPHILS # BLD AUTO: 2.24 X10 (3) UL (ref 1.5–7.7)
NEUTROPHILS # BLD AUTO: 2.24 X10(3) UL (ref 1.5–7.7)
NEUTROPHILS NFR BLD AUTO: 61.7 %
OSMOLALITY SERPL CALC.SUM OF ELEC: 288 MOSM/KG (ref 275–295)
PATIENT FASTING Y/N/NP: YES
PLATELET # BLD AUTO: 141 10(3)UL (ref 150–450)
POTASSIUM SERPL-SCNC: 4.3 MMOL/L (ref 3.5–5.1)
RBC # BLD AUTO: 4.76 X10(6)UL
SODIUM SERPL-SCNC: 139 MMOL/L (ref 136–145)
WBC # BLD AUTO: 3.6 X10(3) UL (ref 4–11)

## 2021-09-09 PROCEDURE — 83615 LACTATE (LD) (LDH) ENZYME: CPT

## 2021-09-09 PROCEDURE — 80053 COMPREHEN METABOLIC PANEL: CPT

## 2021-09-09 PROCEDURE — 85025 COMPLETE CBC W/AUTO DIFF WBC: CPT

## 2021-09-09 PROCEDURE — 82248 BILIRUBIN DIRECT: CPT

## 2021-09-09 PROCEDURE — 36415 COLL VENOUS BLD VENIPUNCTURE: CPT

## 2021-09-10 ENCOUNTER — HOSPITAL ENCOUNTER (OUTPATIENT)
Dept: MAMMOGRAPHY | Age: 66
Discharge: HOME | End: 2021-09-10
Attending: SURGERY
Payer: COMMERCIAL

## 2021-09-10 ENCOUNTER — TELEPHONE (OUTPATIENT)
Dept: RHEUMATOLOGY | Facility: CLINIC | Age: 66
End: 2021-09-10

## 2021-09-10 DIAGNOSIS — C50.121 MALIGNANT NEOPLASM OF CENTRAL PORTION OF RIGHT BREAST IN MALE, ESTROGEN RECEPTOR POSITIVE (HCC): ICD-10-CM

## 2021-09-10 DIAGNOSIS — E80.6 INDIRECT HYPERBILIRUBINEMIA: Primary | ICD-10-CM

## 2021-09-10 DIAGNOSIS — Z17.0 MALIGNANT NEOPLASM OF CENTRAL PORTION OF RIGHT BREAST IN MALE, ESTROGEN RECEPTOR POSITIVE (HCC): ICD-10-CM

## 2021-09-10 DIAGNOSIS — Z12.31 ENCOUNTER FOR SCREENING MAMMOGRAM FOR MALIGNANT NEOPLASM OF BREAST: ICD-10-CM

## 2021-09-10 LAB — LDH SERPL L TO P-CCNC: 229 U/L

## 2021-09-10 PROCEDURE — 77061 BREAST TOMOSYNTHESIS UNI: CPT | Performed by: SURGERY

## 2021-09-10 PROCEDURE — 77065 DX MAMMO INCL CAD UNI: CPT | Performed by: SURGERY

## 2021-09-10 NOTE — PROGRESS NOTES
Leanne Gale    Inflammation is low. Blood count, kidney function are stable. One particular liver test (indirect bilirubin) was a slightly elevated.  It could be from the sulfasalazine, but it looks like you have had some slight upticks and blip

## 2021-09-14 ENCOUNTER — OFFICE VISIT (OUTPATIENT)
Dept: RHEUMATOLOGY | Facility: CLINIC | Age: 66
End: 2021-09-14
Payer: COMMERCIAL

## 2021-09-14 VITALS
RESPIRATION RATE: 16 BRPM | HEART RATE: 73 BPM | DIASTOLIC BLOOD PRESSURE: 80 MMHG | SYSTOLIC BLOOD PRESSURE: 124 MMHG | BODY MASS INDEX: 30.93 KG/M2 | WEIGHT: 208.81 LBS | HEIGHT: 69 IN | TEMPERATURE: 98 F | OXYGEN SATURATION: 98 %

## 2021-09-14 DIAGNOSIS — M25.50 POLYARTHRALGIA: ICD-10-CM

## 2021-09-14 DIAGNOSIS — Z86.19 HISTORY OF INFECTION: ICD-10-CM

## 2021-09-14 DIAGNOSIS — Z87.440 HISTORY OF FREQUENT URINARY TRACT INFECTIONS: ICD-10-CM

## 2021-09-14 DIAGNOSIS — M19.90 INFLAMMATORY ARTHRITIS: Primary | ICD-10-CM

## 2021-09-14 DIAGNOSIS — M25.531 BILATERAL WRIST PAIN: ICD-10-CM

## 2021-09-14 DIAGNOSIS — M25.532 BILATERAL WRIST PAIN: ICD-10-CM

## 2021-09-14 DIAGNOSIS — D72.819 LEUKOPENIA, UNSPECIFIED TYPE: ICD-10-CM

## 2021-09-14 PROCEDURE — 3008F BODY MASS INDEX DOCD: CPT | Performed by: INTERNAL MEDICINE

## 2021-09-14 PROCEDURE — 3074F SYST BP LT 130 MM HG: CPT | Performed by: INTERNAL MEDICINE

## 2021-09-14 PROCEDURE — 3079F DIAST BP 80-89 MM HG: CPT | Performed by: INTERNAL MEDICINE

## 2021-09-14 PROCEDURE — 99215 OFFICE O/P EST HI 40 MIN: CPT | Performed by: INTERNAL MEDICINE

## 2021-09-14 RX ORDER — CELECOXIB 100 MG/1
CAPSULE ORAL
COMMUNITY
Start: 2021-08-27 | End: 2021-12-20

## 2021-09-14 RX ORDER — OMEPRAZOLE 40 MG/1
40 CAPSULE, DELAYED RELEASE ORAL
COMMUNITY
Start: 2021-05-01 | End: 2021-09-29

## 2021-09-14 NOTE — PROGRESS NOTES
RHEUMATOLOGY FOLLOW UP   Date of visit: 09/14/2021  ? Patient presents with:   Follow - Up: 3 month      ASSESSMENT, DISCUSSION & PLAN   Assessment:  Inflammatory arthritis  (primary encounter diagnosis)  Polyarthralgia  Bilateral wrist pain  Leukopenia, methotrexate. However sulfasalazine was on backorder due to [employer], so pt was started on methotrexate. He has been taking 6 tabs once weekly for the past 6 weeks without significant relief.  I recommend stopping DMARD and approaching his pain dif arthritis  -     IMMUNOGLOBULIN A/G/M, QUANT; Future  -     MONOCLONAL PROTEIN STUDY; Future  -     COMPLEMENT C4, SERUM; Future  -     COMPLEMENT C3, SERUM; Future  -     COMPLEMENT, TOTAL (CH50); Future  -     SED RATE, EFRAIN (AUTOMATED);  Future  - right knee pain for which he saw ortho and had an injection there. He is going through PT still for the right shoulder (post surgical repair for rotator cuff tears) as well as for the knee and wrist now. Continues to have the joint pain.  Denies overt swe improvement of symptoms.      Hx of prostate cancer s/p prostatectomy (no radiation or chemo) - Jan 2015  Hx of breast cancer s/p mastectomy; on tamoxifen now  (no radiation or chemo) - Dec 2015  Hx of multiple skin cancers (both squamous and basal)  Hx of left wrist   • Actinic keratosis 11/6/2014    right forearm   • Actinic keratosis 4/8/2014    left neck   • Actinic keratosis 7/2/2013    left forearm   • Anesthesia complication     HARD TO AWAKEN AFTER PROSTECTOMY   • Arthritis    • Basal cell carcinoma TONSILLECTOMY       Family History:  Family History   Problem Relation Age of Onset   • Breast Cancer Sister 48        uterine ca after tamoxifen tx (dx 46)   • Cancer Sister 46        uterine ca (due to tamoxifen)   • Ovarian Cancer Sister    • Other Kavon Patella daily. Takes 5 days a week , Disp: , Rfl:       Modified Medications    No medications on file     There are no discontinued medications. ?  ?  Allergies:    Levaquin [Levofloxa*    SWELLING    Comment:Other reaction(s):  Other             Insomnia; swelli Eyes:      General: No scleral icterus. Extraocular Movements: Extraocular movements intact. Conjunctiva/sclera: Conjunctivae normal.   Neck:      Vascular: No JVD. Trachea: No tracheal deviation.    Cardiovascular:      Rate and Rhythm: Nor system with a target due date for the next mammogram.    Dictated by (CST): Kassy Gregorio MD on 9/10/2021 at 8:17 AM     Finalized by (TYLER): Kassy Gregorio MD on 9/10/2021 at 8:19 AM        PROCEDURE:  MRI WRIST (W+WO), LEFT (CPT=73223)       COMPARISON Syndrome.  No abnormal enhancement. GUYON'S CANAL:  Normal appearance of the ulnar nerve.  No mass lesions.  No abnormal enhancement.                        Impression   CONCLUSION:     1.  There is extensive subchondral cyst formation in the distal radiu anteriorly which is best appreciated   on the lateral view.          =====  CONCLUSION:  Fracture of a fusion screw anteriorly within the midfoot. This is age indeterminate. Correlation with any other prior images is suggested.      Moderate degenerative dimension, causing focal effacement of the thecal sac but no sign of compression of the lower thoracic spinal cord. These thoracic levels are only visualized on the   sagittal images including the lower thoracic, not visualize on axial images.   The lower followed up if clinically needed.      =====  CONCLUSION:  Bilateral L5 pars defect with grade 1 anterior subluxation L5, and bilateral foraminal stenosis worse on the left. Milder degenerative disc and facet changes at other levels as described above. normal   HLAB27 negative  CRP normal   ESR normal  ARNAUD negative  BAMBI panel negative  RF negative  CCP negative     09/2020  Blood culture neg x 2    08/19/2020  ESR 8 normal  Blood culture neg x 2    08/05/2020  HsCRP normal   ESR 6 normal   Uric acid 7.1

## 2021-09-15 NOTE — PATIENT INSTRUCTIONS
-- stop methotrexate  -- okay to take celebrex.  Do not take nabumetone and celebrex at the same time though  -- get updated labs to check for immunodeficiencies as well as updated UA  -- if urine still abnormal, would recommend re-evaluation by urology  --

## 2021-09-28 NOTE — ASSESSMENT & PLAN NOTE
Stable, continue present management Last Platelet value was 806 done 9/9/2021. Lowest level in the last 10 years was 119.

## 2021-09-29 ENCOUNTER — OFFICE VISIT (OUTPATIENT)
Dept: FAMILY MEDICINE CLINIC | Facility: CLINIC | Age: 66
End: 2021-09-29
Payer: COMMERCIAL

## 2021-09-29 VITALS
DIASTOLIC BLOOD PRESSURE: 80 MMHG | SYSTOLIC BLOOD PRESSURE: 134 MMHG | WEIGHT: 205.38 LBS | HEART RATE: 78 BPM | BODY MASS INDEX: 31.13 KG/M2 | HEIGHT: 68 IN | RESPIRATION RATE: 16 BRPM

## 2021-09-29 DIAGNOSIS — Z00.00 ANNUAL PHYSICAL EXAM: Primary | ICD-10-CM

## 2021-09-29 DIAGNOSIS — C50.121 MALIGNANT NEOPLASM OF CENTRAL PORTION OF RIGHT BREAST IN MALE, ESTROGEN RECEPTOR POSITIVE (HCC): ICD-10-CM

## 2021-09-29 DIAGNOSIS — E78.2 MIXED HYPERLIPIDEMIA: ICD-10-CM

## 2021-09-29 DIAGNOSIS — Z17.0 MALIGNANT NEOPLASM OF CENTRAL PORTION OF RIGHT BREAST IN MALE, ESTROGEN RECEPTOR POSITIVE (HCC): ICD-10-CM

## 2021-09-29 DIAGNOSIS — K76.0 NAFLD (NONALCOHOLIC FATTY LIVER DISEASE): ICD-10-CM

## 2021-09-29 DIAGNOSIS — F51.01 PRIMARY INSOMNIA: ICD-10-CM

## 2021-09-29 DIAGNOSIS — C61 PROSTATE CANCER (HCC): ICD-10-CM

## 2021-09-29 DIAGNOSIS — D69.6 THROMBOCYTOPENIA (HCC): ICD-10-CM

## 2021-09-29 PROCEDURE — 3075F SYST BP GE 130 - 139MM HG: CPT | Performed by: FAMILY MEDICINE

## 2021-09-29 PROCEDURE — 99396 PREV VISIT EST AGE 40-64: CPT | Performed by: FAMILY MEDICINE

## 2021-09-29 PROCEDURE — 3008F BODY MASS INDEX DOCD: CPT | Performed by: FAMILY MEDICINE

## 2021-09-29 PROCEDURE — 3079F DIAST BP 80-89 MM HG: CPT | Performed by: FAMILY MEDICINE

## 2021-09-29 RX ORDER — ZOLPIDEM TARTRATE 12.5 MG/1
TABLET, FILM COATED, EXTENDED RELEASE ORAL
Qty: 30 TABLET | Refills: 5 | Status: SHIPPED | OUTPATIENT
Start: 2021-09-29

## 2021-09-29 RX ORDER — ATORVASTATIN CALCIUM 40 MG/1
40 TABLET, FILM COATED ORAL DAILY
Qty: 90 TABLET | Refills: 3 | Status: SHIPPED | OUTPATIENT
Start: 2021-09-29

## 2021-09-29 NOTE — PROGRESS NOTES
Vida Gomez is a 60year old male who presents for a complete physical exam.     had concerns including Physical (annual ) and Immunization/Injection (shingles ).    His last annual assessment has been over 1 year: Annual Physical due on 10/03/2020 for this or any previous visit. Colonoscopy due on 12/15/2023   No recommendations at this time   PSA due on 09/27/2020  Annual Physical due on 10/03/2020  Zoster Vaccines(2 of 2) due on 06/02/2021      Review of Systems   Constitutional: Negative.   Ne Tobacco Use      Smoking status: Never Smoker      Smokeless tobacco: Never Used    Vaping Use      Vaping Use: Never used    Substance and Sexual Activity      Alcohol use:  Yes        Alcohol/week: 2.0 - 3.0 standard drinks        Types: 2 - 3 Standard dr Breath sounds: Normal breath sounds. Abdominal:      General: Abdomen is flat. Bowel sounds are normal. There is no distension. Palpations: Abdomen is soft. Musculoskeletal:         General: Normal range of motion.       Cervical back: Normal range (Primary)  2. Mixed hyperlipidemia  Overview:  Atorvastatin 40  Assessment & Plan:  Stable, Continue present management. Cholesterol Lowering Medications          ATORVASTATIN 40 MG Oral Tab          Orders:  -     Atorvastatin Calcium;  Take 1 tablet (4

## 2021-10-19 ENCOUNTER — OFFICE VISIT (OUTPATIENT)
Dept: FAMILY MEDICINE CLINIC | Facility: CLINIC | Age: 66
End: 2021-10-19
Payer: COMMERCIAL

## 2021-10-19 ENCOUNTER — LAB ENCOUNTER (OUTPATIENT)
Dept: LAB | Facility: HOSPITAL | Age: 66
End: 2021-10-19
Attending: PHYSICIAN ASSISTANT
Payer: COMMERCIAL

## 2021-10-19 ENCOUNTER — TELEPHONE (OUTPATIENT)
Dept: FAMILY MEDICINE CLINIC | Facility: CLINIC | Age: 66
End: 2021-10-19

## 2021-10-19 VITALS
WEIGHT: 204 LBS | HEART RATE: 80 BPM | BODY MASS INDEX: 30.56 KG/M2 | SYSTOLIC BLOOD PRESSURE: 130 MMHG | RESPIRATION RATE: 16 BRPM | DIASTOLIC BLOOD PRESSURE: 90 MMHG | OXYGEN SATURATION: 98 % | HEIGHT: 68.5 IN | TEMPERATURE: 98 F

## 2021-10-19 DIAGNOSIS — J02.9 SORE THROAT: Primary | ICD-10-CM

## 2021-10-19 DIAGNOSIS — J02.9 SORE THROAT: ICD-10-CM

## 2021-10-19 PROCEDURE — 3075F SYST BP GE 130 - 139MM HG: CPT | Performed by: PHYSICIAN ASSISTANT

## 2021-10-19 PROCEDURE — 3080F DIAST BP >= 90 MM HG: CPT | Performed by: PHYSICIAN ASSISTANT

## 2021-10-19 PROCEDURE — 3008F BODY MASS INDEX DOCD: CPT | Performed by: PHYSICIAN ASSISTANT

## 2021-10-19 PROCEDURE — 99214 OFFICE O/P EST MOD 30 MIN: CPT | Performed by: PHYSICIAN ASSISTANT

## 2021-10-19 NOTE — TELEPHONE ENCOUNTER
Mark Kincaid has been running a low grade fever since Saturday 99.5 and as high as 100.3. He has had a HA since Sunday and new joint pain in fingers,knukles and Hip. (He is seeing a rheumatologist since August 2020 for ankle,wrist and knee pain).  He has a sore thro

## 2021-10-19 NOTE — TELEPHONE ENCOUNTER
Pt has been having low grade fever, chills, sore throat, and body aches since Saturday and he went for Covid test yesterday and it was negative.   He also had his booster shot on 10/6/21

## 2021-10-20 ENCOUNTER — LAB ENCOUNTER (OUTPATIENT)
Dept: LAB | Age: 66
End: 2021-10-20
Attending: PHYSICIAN ASSISTANT
Payer: COMMERCIAL

## 2021-10-20 ENCOUNTER — TELEPHONE (OUTPATIENT)
Dept: FAMILY MEDICINE CLINIC | Facility: CLINIC | Age: 66
End: 2021-10-20

## 2021-10-20 DIAGNOSIS — R30.0 DYSURIA: ICD-10-CM

## 2021-10-20 DIAGNOSIS — R30.0 DYSURIA: Primary | ICD-10-CM

## 2021-10-20 PROCEDURE — 81003 URINALYSIS AUTO W/O SCOPE: CPT

## 2021-10-20 NOTE — TELEPHONE ENCOUNTER
Spoke with Mark Pavon, telling him an order for a Urinalysis with Reflex to Culture was entered for him. Discussed dropping off a specimen at an [facility]. He will call Central Scheduling to see if he needs an appointment.

## 2021-10-21 ENCOUNTER — TELEPHONE (OUTPATIENT)
Dept: FAMILY MEDICINE CLINIC | Facility: CLINIC | Age: 66
End: 2021-10-21

## 2021-10-21 RX ORDER — COVID-19 MOLECULAR TEST ASSAY
KIT MISCELLANEOUS
COMMUNITY
Start: 2021-10-18

## 2021-10-21 NOTE — TELEPHONE ENCOUNTER
His urinalysis was completely normal. COVID was normal. It is still very likely a viral infection compounded by the fact that he cannot take any OTC medications right now due to his rheum concerns. There really is nothing clear to treat right now.  (having

## 2021-10-21 NOTE — PROGRESS NOTES
Patient presents with: Body ache and/or chills: x4 days       HISTORY OF PRESENT ILLNESS  Chikis Quinonez is a 73year old male who presents for evaluation of body aches.  Notes that for the last 4 days he has been having intermittent low grade fevers up t , Disp: , Rfl:   •  ID NOW COVID-19 In Vitro Kit, USE 1 KIT TODAY AS DIRECTED, Disp: , Rfl:     Allergies:  Levaquin [Levofloxacin]    Patient Active Problem List:     Prostate cancer ([facility] [address]     Pain in joint, ankle and foot     Mixed hyperlipidemia     Vit BP: 130/90   Pulse: 80   Resp: 16   Temp: 98.1 °F (36.7 °C)       PHYSICAL EXAM  GENERAL:  Alert and in no acute distress. Well groomed and appropriately dressed. HEENT: Normocephalic, atraumatic. Ears clear bilaterally. Normal posterior oropharynx.   Rocio Heymann

## 2021-10-21 NOTE — TELEPHONE ENCOUNTER
Patient saw Anna Selvin 10/19/21, still has ongoing fever, sinus and UTI, had both rapid and PCR Covid tests and both were negative.  All symptoms still ongoing, what's next step

## 2021-10-21 NOTE — TELEPHONE ENCOUNTER
Called and talked to patient he stated he is feeling bad I told him what Lucie Cordova said and he will try to wait it out.

## 2021-11-09 ENCOUNTER — TELEPHONE (OUTPATIENT)
Dept: RHEUMATOLOGY | Facility: CLINIC | Age: 66
End: 2021-11-09

## 2021-11-10 NOTE — TELEPHONE ENCOUNTER
Refill request from pt pharmacy for Sulfasalazine. Phoned pt to clarify. Pt states he is not taking at present time, has medication in his medicine cabinet. Will wait to next appt with Dr. Nani Mcgregor to verify treatment plan. Voiced understanding.

## 2021-12-09 ENCOUNTER — LAB ENCOUNTER (OUTPATIENT)
Dept: LAB | Age: 66
End: 2021-12-09
Attending: INTERNAL MEDICINE
Payer: COMMERCIAL

## 2021-12-09 DIAGNOSIS — M25.50 POLYARTHRALGIA: ICD-10-CM

## 2021-12-09 DIAGNOSIS — E80.6 INDIRECT HYPERBILIRUBINEMIA: ICD-10-CM

## 2021-12-09 DIAGNOSIS — Z86.19 HISTORY OF INFECTION: ICD-10-CM

## 2021-12-09 DIAGNOSIS — Z51.81 THERAPEUTIC DRUG MONITORING: ICD-10-CM

## 2021-12-09 DIAGNOSIS — M19.90 INFLAMMATORY ARTHRITIS: ICD-10-CM

## 2021-12-09 DIAGNOSIS — D72.819 LEUKOPENIA, UNSPECIFIED TYPE: ICD-10-CM

## 2021-12-09 DIAGNOSIS — Z87.440 HISTORY OF FREQUENT URINARY TRACT INFECTIONS: ICD-10-CM

## 2021-12-09 PROCEDURE — 81003 URINALYSIS AUTO W/O SCOPE: CPT | Performed by: INTERNAL MEDICINE

## 2021-12-09 PROCEDURE — 86160 COMPLEMENT ANTIGEN: CPT | Performed by: INTERNAL MEDICINE

## 2021-12-09 PROCEDURE — 85025 COMPLETE CBC W/AUTO DIFF WBC: CPT | Performed by: INTERNAL MEDICINE

## 2021-12-09 PROCEDURE — 86140 C-REACTIVE PROTEIN: CPT | Performed by: INTERNAL MEDICINE

## 2021-12-09 PROCEDURE — 83010 ASSAY OF HAPTOGLOBIN QUANT: CPT | Performed by: INTERNAL MEDICINE

## 2021-12-09 PROCEDURE — 85045 AUTOMATED RETICULOCYTE COUNT: CPT | Performed by: INTERNAL MEDICINE

## 2021-12-09 PROCEDURE — 86162 COMPLEMENT TOTAL (CH50): CPT | Performed by: INTERNAL MEDICINE

## 2021-12-09 PROCEDURE — 82248 BILIRUBIN DIRECT: CPT | Performed by: INTERNAL MEDICINE

## 2021-12-09 PROCEDURE — 86334 IMMUNOFIX E-PHORESIS SERUM: CPT | Performed by: INTERNAL MEDICINE

## 2021-12-09 PROCEDURE — 85652 RBC SED RATE AUTOMATED: CPT | Performed by: INTERNAL MEDICINE

## 2021-12-09 PROCEDURE — 80053 COMPREHEN METABOLIC PANEL: CPT | Performed by: INTERNAL MEDICINE

## 2021-12-09 PROCEDURE — 84165 PROTEIN E-PHORESIS SERUM: CPT | Performed by: INTERNAL MEDICINE

## 2021-12-15 ENCOUNTER — OFFICE VISIT (OUTPATIENT)
Dept: RHEUMATOLOGY | Facility: CLINIC | Age: 66
End: 2021-12-15
Payer: COMMERCIAL

## 2021-12-15 VITALS
BODY MASS INDEX: 29.63 KG/M2 | TEMPERATURE: 99 F | WEIGHT: 207 LBS | DIASTOLIC BLOOD PRESSURE: 90 MMHG | HEART RATE: 70 BPM | SYSTOLIC BLOOD PRESSURE: 138 MMHG | RESPIRATION RATE: 16 BRPM | HEIGHT: 70 IN | OXYGEN SATURATION: 98 %

## 2021-12-15 DIAGNOSIS — D72.819 LEUKOPENIA, UNSPECIFIED TYPE: ICD-10-CM

## 2021-12-15 DIAGNOSIS — M25.532 BILATERAL WRIST PAIN: ICD-10-CM

## 2021-12-15 DIAGNOSIS — M25.572 CHRONIC PAIN OF BOTH ANKLES: ICD-10-CM

## 2021-12-15 DIAGNOSIS — M25.531 BILATERAL WRIST PAIN: ICD-10-CM

## 2021-12-15 DIAGNOSIS — M19.90 INFLAMMATORY ARTHRITIS: Primary | ICD-10-CM

## 2021-12-15 DIAGNOSIS — D80.4 SELECTIVE IGM DEFICIENCY (HCC): ICD-10-CM

## 2021-12-15 DIAGNOSIS — R17 ELEVATED BILIRUBIN: ICD-10-CM

## 2021-12-15 DIAGNOSIS — G89.29 CHRONIC PAIN OF BOTH ANKLES: ICD-10-CM

## 2021-12-15 DIAGNOSIS — M25.50 POLYARTHRALGIA: ICD-10-CM

## 2021-12-15 DIAGNOSIS — M25.571 CHRONIC PAIN OF BOTH ANKLES: ICD-10-CM

## 2021-12-15 DIAGNOSIS — Z79.899 HIGH RISK MEDICATION USE: ICD-10-CM

## 2021-12-15 DIAGNOSIS — Z87.440 HISTORY OF FREQUENT URINARY TRACT INFECTIONS: ICD-10-CM

## 2021-12-15 PROCEDURE — 99215 OFFICE O/P EST HI 40 MIN: CPT | Performed by: INTERNAL MEDICINE

## 2021-12-15 PROCEDURE — 3080F DIAST BP >= 90 MM HG: CPT | Performed by: INTERNAL MEDICINE

## 2021-12-15 PROCEDURE — 3075F SYST BP GE 130 - 139MM HG: CPT | Performed by: INTERNAL MEDICINE

## 2021-12-15 PROCEDURE — 3008F BODY MASS INDEX DOCD: CPT | Performed by: INTERNAL MEDICINE

## 2021-12-15 NOTE — PROGRESS NOTES
RHEUMATOLOGY FOLLOW UP   Date of visit: 12/15/2021  ? Patient presents with: Follow - Up: 3 month f/u. Feeling about the same. Joint pain. Ankles, hands, wrists, and right knee. Not much swelling. No new symptoms.        ASSESSMENT, DISCUSSION & PLAN complicated. He already has borderline renal function as well as hx of fatty liver for which he is now following with Dr. Valencia Solano. Had recommended if DMARD therapy initiated, to try sulfasalazine instead of methotrexate.  However sulfasalazine was on backorde of water to avoid an upset stomach. It usually takes between 2-3 months to notice any improvement of symptoms after starting the medication. In general, most patients can take sulfasalazine with few side effects.  The most common side effects are nausea an counseling and educating the patient/family/caregiver, ordering medications or testing, referring and communicating with other healthcare providers, documenting clinical information in the E HR, independently interpreting results and communicating results up virtually today. Since his last visit, he has been off all medications. Was on nabumetone and methotrexate but stopped both medications in September. Noticed left ankle pain that's constant as well as bilateral wrist pain.  Denies overt swelling surgery in both ankles and both feet. States the morning he went to see his PCP, he had left knee pain and swelling and felt warmer (checked temperature). Followed with one orthopedic surgeon. Had fluid aspirated and told to check in to ER.  Was in hospital history of prior blood clot in the legs or lungs, strokes or ischemic phenomenon. Denies nonhealing ulcers on the fingertips, trouble swallowing, or severe acid reflux.   The patient denies any history of uveitis, crampy abdominal pain,  constipation, diar Location: [facility] ENDOSCOPY   • COLONOSCOPY  12/15/2018   • COLONOSCOPY & POLYPECTOMY  12/5/16    small polyp at prior polypectomy site    • FEMUR/KNEE SURG UNLISTED      right knee, patient denies   • FOOT/TOES SURGERY PROC UNLISTED  12/2012    Tarsil fusion, No    Medications:  sulfaSALAzine 500 MG Oral Tab, Take 1 tablet (500 mg total) by mouth 2 (two) times daily. , Disp: 180 tablet, Rfl: 0  OMEPRAZOLE 40 MG Oral Capsule Delayed Release, TAKE 1 CAPSULE BY MOUTH EVERY DAY BEFORE A MEAL, Disp: 30 capsule, Rfl: The patient has insomnia. The patient is not nervous/anxious. annelise Kinney-miguel     PHYSICAL EXAM   Today's Vitals:  Temperature Blood Pressure Heart Rate Resp Rate SpO2   Temp: 98.8 °F (37.1 °C) BP: 138/90 Pulse: 70 Resp: 16 SpO2: 98 %   ?   Current We rash.      Comments: Multiple hyperpigmented spots  No periungal erythema  No nail pitting   Neurological:      General: No focal deficit present. Mental Status: He is alert and oriented to person, place, and time.       Cranial Nerves: No cranial nerv degeneration of the triangular fibrocartilage.  There is no typical full-thickness tear. TENDONS:  Flexor and extensor tendons are unremarkable.    CARPAL TUNNEL:  Normal appearance of the median nerve.  No mass lesions.  No evidence to suggest Carpal Wilbert pain, no recent injury. FINDINGS:    Postsurgical changes noted of the midfoot. Bony fusion is noted. No bony fracture is seen. Mild osteopenia is present. Prominent osteophyte formation noted over the midfoot.   There is a fracture of a fusion degrees at different levels, described individually below. DECRIPTION OF INDIVIDUAL DISC LEVELS     Partially visualized lower thoracic level, and thoracolumbar junction:     T10-11 shows small disc bulging.   T11-12 shows greater disc bulging measuring the large field of view localizer series is a cystic mass in the right pelvis/adnexa, also observed on CT of the abdomen from 19 days earlier, likely cystic mass of ovarian etiology. This apparently with stable   when compared with earlier CT imaging.   Aroldo Million globulin  IgA and IgG normal  IgM 27.7 low    06/2021  TB negative  G6PD normal  ESR 9 normal  CRP normal  Hepatitis B serologies negative  Hepatitis C serology negative    02/2021  CRP normal  ESR 10 normal    10/2020  Uric acid 5.7  SPEP grossly normal

## 2021-12-16 RX ORDER — SULFASALAZINE 500 MG/1
500 TABLET ORAL 2 TIMES DAILY
Qty: 180 TABLET | Refills: 0 | Status: SHIPPED | OUTPATIENT
Start: 2021-12-16 | End: 2022-01-26

## 2022-01-05 PROCEDURE — 88305 TISSUE EXAM BY PATHOLOGIST: CPT | Performed by: OTOLARYNGOLOGY

## 2022-01-26 DIAGNOSIS — D72.819 LEUKOPENIA, UNSPECIFIED TYPE: ICD-10-CM

## 2022-01-26 DIAGNOSIS — D80.4 SELECTIVE IGM DEFICIENCY (HCC): ICD-10-CM

## 2022-01-26 DIAGNOSIS — M47.819 SPONDYLOARTHRITIS: ICD-10-CM

## 2022-01-26 DIAGNOSIS — M19.90 INFLAMMATORY ARTHRITIS: ICD-10-CM

## 2022-01-26 DIAGNOSIS — Z79.899 HIGH RISK MEDICATION USE: ICD-10-CM

## 2022-01-26 RX ORDER — FOLIC ACID 1 MG/1
TABLET ORAL
Qty: 90 TABLET | Refills: 0 | Status: SHIPPED | OUTPATIENT
Start: 2022-01-26

## 2022-01-26 RX ORDER — SULFASALAZINE 500 MG/1
500 TABLET ORAL 2 TIMES DAILY
Qty: 180 TABLET | Refills: 0 | Status: SHIPPED | OUTPATIENT
Start: 2022-01-26

## 2022-01-26 NOTE — TELEPHONE ENCOUNTER
LOV 12-15-21  Future Appointments   Date Time Provider Magdaleno Angelic   2/2/2022  4:50 PM Estefanía Deshpande MD [id number] [address] [facility]   2/9/2022  9:00 AM Torri Steele MD [id number] [facility] HEM ONC Evelyn Zaldivar   2/22/2022  8:30 AM William De La Vega MD [facility]

## 2022-02-09 ENCOUNTER — OFFICE VISIT (OUTPATIENT)
Dept: HEMATOLOGY/ONCOLOGY | Facility: HOSPITAL | Age: 67
End: 2022-02-09
Attending: INTERNAL MEDICINE
Payer: COMMERCIAL

## 2022-02-09 ENCOUNTER — LAB ENCOUNTER (OUTPATIENT)
Dept: LAB | Age: 67
End: 2022-02-09
Attending: INTERNAL MEDICINE
Payer: COMMERCIAL

## 2022-02-09 VITALS
WEIGHT: 206 LBS | OXYGEN SATURATION: 96 % | BODY MASS INDEX: 29.49 KG/M2 | RESPIRATION RATE: 16 BRPM | HEIGHT: 70 IN | HEART RATE: 77 BPM | TEMPERATURE: 96 F | SYSTOLIC BLOOD PRESSURE: 135 MMHG | DIASTOLIC BLOOD PRESSURE: 92 MMHG

## 2022-02-09 DIAGNOSIS — D72.819 LEUKOPENIA, UNSPECIFIED TYPE: ICD-10-CM

## 2022-02-09 DIAGNOSIS — Z51.81 THERAPEUTIC DRUG MONITORING: ICD-10-CM

## 2022-02-09 DIAGNOSIS — Z79.899 HIGH RISK MEDICATION USE: ICD-10-CM

## 2022-02-09 DIAGNOSIS — M19.90 INFLAMMATORY ARTHRITIS: ICD-10-CM

## 2022-02-09 DIAGNOSIS — C50.121 MALIGNANT NEOPLASM OF CENTRAL PORTION OF RIGHT BREAST IN MALE, ESTROGEN RECEPTOR POSITIVE (HCC): Primary | ICD-10-CM

## 2022-02-09 DIAGNOSIS — Z17.0 MALIGNANT NEOPLASM OF CENTRAL PORTION OF RIGHT BREAST IN MALE, ESTROGEN RECEPTOR POSITIVE (HCC): Primary | ICD-10-CM

## 2022-02-09 DIAGNOSIS — D69.6 THROMBOCYTOPENIA (HCC): ICD-10-CM

## 2022-02-09 DIAGNOSIS — D80.4 SELECTIVE IGM DEFICIENCY (HCC): ICD-10-CM

## 2022-02-09 DIAGNOSIS — C61 PROSTATE CANCER (HCC): ICD-10-CM

## 2022-02-09 LAB
ALBUMIN SERPL-MCNC: 4.2 G/DL (ref 3.4–5)
ALBUMIN/GLOB SERPL: 1.3 {RATIO} (ref 1–2)
ALP LIVER SERPL-CCNC: 104 U/L
ALT SERPL-CCNC: 38 U/L
ANION GAP SERPL CALC-SCNC: 4 MMOL/L (ref 0–18)
AST SERPL-CCNC: 29 U/L (ref 15–37)
BASOPHILS # BLD AUTO: 0.02 X10(3) UL (ref 0–0.2)
BASOPHILS NFR BLD AUTO: 0.5 %
BILIRUB SERPL-MCNC: 1.8 MG/DL (ref 0.1–2)
BUN BLD-MCNC: 12 MG/DL (ref 7–18)
CALCIUM BLD-MCNC: 9.2 MG/DL (ref 8.5–10.1)
CHLORIDE SERPL-SCNC: 105 MMOL/L (ref 98–112)
CO2 SERPL-SCNC: 29 MMOL/L (ref 21–32)
CREAT BLD-MCNC: 1.12 MG/DL
EOSINOPHIL # BLD AUTO: 0.08 X10(3) UL (ref 0–0.7)
EOSINOPHIL NFR BLD AUTO: 2 %
ERYTHROCYTE [DISTWIDTH] IN BLOOD BY AUTOMATED COUNT: 13 %
FASTING STATUS PATIENT QL REPORTED: NO
GLOBULIN PLAS-MCNC: 3.3 G/DL (ref 2.8–4.4)
GLUCOSE BLD-MCNC: 103 MG/DL (ref 70–99)
HCT VFR BLD AUTO: 49.4 %
HGB BLD-MCNC: 16.3 G/DL
IGA SERPL-MCNC: 88 MG/DL (ref 70–312)
IGM SERPL-MCNC: 36 MG/DL (ref 43–279)
IMM GRANULOCYTES # BLD AUTO: 0.05 X10(3) UL (ref 0–1)
IMM GRANULOCYTES NFR BLD: 1.3 %
IMMUNOGLOBULIN PNL SER-MCNC: 933 MG/DL (ref 791–1643)
LYMPHOCYTES # BLD AUTO: 0.74 X10(3) UL (ref 1–4)
LYMPHOCYTES NFR BLD AUTO: 18.7 %
MCH RBC QN AUTO: 30.7 PG (ref 26–34)
MCHC RBC AUTO-ENTMCNC: 33 G/DL (ref 31–37)
MCV RBC AUTO: 93 FL
MONOCYTES # BLD AUTO: 0.66 X10(3) UL (ref 0.1–1)
MONOCYTES NFR BLD AUTO: 16.7 %
NEUTROPHILS # BLD AUTO: 2.41 X10 (3) UL (ref 1.5–7.7)
NEUTROPHILS # BLD AUTO: 2.41 X10(3) UL (ref 1.5–7.7)
NEUTROPHILS NFR BLD AUTO: 60.8 %
OSMOLALITY SERPL CALC.SUM OF ELEC: 286 MOSM/KG (ref 275–295)
PLATELET # BLD AUTO: 150 10(3)UL (ref 150–450)
POTASSIUM SERPL-SCNC: 4.2 MMOL/L (ref 3.5–5.1)
PROT SERPL-MCNC: 7.5 G/DL (ref 6.4–8.2)
RBC # BLD AUTO: 5.31 X10(6)UL
SODIUM SERPL-SCNC: 138 MMOL/L (ref 136–145)
WBC # BLD AUTO: 4 X10(3) UL (ref 4–11)

## 2022-02-09 PROCEDURE — 85025 COMPLETE CBC W/AUTO DIFF WBC: CPT | Performed by: INTERNAL MEDICINE

## 2022-02-09 PROCEDURE — 99213 OFFICE O/P EST LOW 20 MIN: CPT | Performed by: INTERNAL MEDICINE

## 2022-02-09 PROCEDURE — 80053 COMPREHEN METABOLIC PANEL: CPT | Performed by: INTERNAL MEDICINE

## 2022-02-09 PROCEDURE — 82784 ASSAY IGA/IGD/IGG/IGM EACH: CPT | Performed by: INTERNAL MEDICINE

## 2022-02-09 NOTE — PROGRESS NOTES
Patient is here for follow up for breast cancer. He reports that he is \"status quo\". He has recently had a tongue surgery for a non-cancerous lump. Recovery from this has been good. He is eating well. He does have general joint aches and has been diagnosed with arthritis in 8/2020. This does limit his level of activity. He follows with urology regarding his prostate cancer and his PSA level has been good. He had his mammogram in September. He has no breast complaints. He denies fever or cough.      Education Record    Learner:  Patient    Disease / Diagnosis: Breast cancer    Barriers / Limitations:  None   Comments:    Method:  Discussion   Comments:    General Topics:  Side effects and symptom management   Comments:    Outcome:  Shows understanding   Comments:

## 2022-02-21 RX ORDER — FOLIC ACID 1 MG/1
TABLET ORAL
Qty: 90 TABLET | Refills: 0 | Status: SHIPPED | OUTPATIENT
Start: 2022-02-21 | End: 2022-03-23

## 2022-02-21 NOTE — TELEPHONE ENCOUNTER
Future Appointments   Date Time Provider Magdaleno Angelic   2/22/2022  8:30 AM Mak Orta MD [facility]   3/23/2022  5:30 PM Edith Grier [facility] [place] Roseanne   4/6/2022  7:45 AM Willie Ochoa MD [facility] 3 [facility]   4/7/2022  8:15 AM Mak Orta MD [facility] [place]   9/6/2022  8:15 AM Eliana Jay MD [facility]G Surg/Onc   2/13/2023  9:00 AM Kala Steele MD [address]     LOV 12/16/21  RTO in 3mo

## 2022-03-18 ENCOUNTER — LAB ENCOUNTER (OUTPATIENT)
Dept: LAB | Age: 67
End: 2022-03-18
Attending: INTERNAL MEDICINE
Payer: COMMERCIAL

## 2022-03-18 DIAGNOSIS — Z79.899 HIGH RISK MEDICATION USE: ICD-10-CM

## 2022-03-18 DIAGNOSIS — M19.90 INFLAMMATORY ARTHRITIS: ICD-10-CM

## 2022-03-18 DIAGNOSIS — D72.819 LEUKOPENIA, UNSPECIFIED TYPE: ICD-10-CM

## 2022-03-18 DIAGNOSIS — D80.4 SELECTIVE IGM DEFICIENCY (HCC): ICD-10-CM

## 2022-03-18 LAB
ALBUMIN SERPL-MCNC: 3.8 G/DL (ref 3.4–5)
ALBUMIN/GLOB SERPL: 1.4 {RATIO} (ref 1–2)
ALP LIVER SERPL-CCNC: 82 U/L
ALT SERPL-CCNC: 38 U/L
ANION GAP SERPL CALC-SCNC: 4 MMOL/L (ref 0–18)
AST SERPL-CCNC: 32 U/L (ref 15–37)
BASOPHILS # BLD AUTO: 0.02 X10(3) UL (ref 0–0.2)
BASOPHILS NFR BLD AUTO: 0.5 %
BUN BLD-MCNC: 15 MG/DL (ref 7–18)
CALCIUM BLD-MCNC: 8.6 MG/DL (ref 8.5–10.1)
CHLORIDE SERPL-SCNC: 108 MMOL/L (ref 98–112)
CO2 SERPL-SCNC: 28 MMOL/L (ref 21–32)
CREAT BLD-MCNC: 1.22 MG/DL
EOSINOPHIL # BLD AUTO: 0.1 X10(3) UL (ref 0–0.7)
EOSINOPHIL NFR BLD AUTO: 2.7 %
ERYTHROCYTE [DISTWIDTH] IN BLOOD BY AUTOMATED COUNT: 12.8 %
FASTING STATUS PATIENT QL REPORTED: NO
GLOBULIN PLAS-MCNC: 2.7 G/DL (ref 2.8–4.4)
GLUCOSE BLD-MCNC: 117 MG/DL (ref 70–99)
HCT VFR BLD AUTO: 43.7 %
HGB BLD-MCNC: 14.6 G/DL
IGA SERPL-MCNC: 71.6 MG/DL (ref 70–312)
IGM SERPL-MCNC: 29.3 MG/DL (ref 43–279)
IMM GRANULOCYTES # BLD AUTO: 0.04 X10(3) UL (ref 0–1)
IMM GRANULOCYTES NFR BLD: 1.1 %
IMMUNOGLOBULIN PNL SER-MCNC: 806 MG/DL (ref 791–1643)
LYMPHOCYTES # BLD AUTO: 0.75 X10(3) UL (ref 1–4)
LYMPHOCYTES NFR BLD AUTO: 20.3 %
MCH RBC QN AUTO: 30.9 PG (ref 26–34)
MCHC RBC AUTO-ENTMCNC: 33.4 G/DL (ref 31–37)
MCV RBC AUTO: 92.6 FL
MONOCYTES # BLD AUTO: 0.73 X10(3) UL (ref 0.1–1)
NEUTROPHILS # BLD AUTO: 2.05 X10 (3) UL (ref 1.5–7.7)
NEUTROPHILS # BLD AUTO: 2.05 X10(3) UL (ref 1.5–7.7)
NEUTROPHILS NFR BLD AUTO: 55.6 %
OSMOLALITY SERPL CALC.SUM OF ELEC: 292 MOSM/KG (ref 275–295)
PLATELET # BLD AUTO: 144 10(3)UL (ref 150–450)
POTASSIUM SERPL-SCNC: 4.2 MMOL/L (ref 3.5–5.1)
PROT SERPL-MCNC: 6.5 G/DL (ref 6.4–8.2)
RBC # BLD AUTO: 4.72 X10(6)UL
SODIUM SERPL-SCNC: 140 MMOL/L (ref 136–145)
WBC # BLD AUTO: 3.7 X10(3) UL (ref 4–11)

## 2022-03-18 PROCEDURE — 80053 COMPREHEN METABOLIC PANEL: CPT | Performed by: INTERNAL MEDICINE

## 2022-03-18 PROCEDURE — 85025 COMPLETE CBC W/AUTO DIFF WBC: CPT | Performed by: INTERNAL MEDICINE

## 2022-03-18 PROCEDURE — 82784 ASSAY IGA/IGD/IGG/IGM EACH: CPT | Performed by: INTERNAL MEDICINE

## 2022-03-23 ENCOUNTER — OFFICE VISIT (OUTPATIENT)
Dept: RHEUMATOLOGY | Facility: CLINIC | Age: 67
End: 2022-03-23
Payer: COMMERCIAL

## 2022-03-23 VITALS
BODY MASS INDEX: 29.2 KG/M2 | HEART RATE: 74 BPM | WEIGHT: 204 LBS | SYSTOLIC BLOOD PRESSURE: 124 MMHG | HEIGHT: 70 IN | TEMPERATURE: 97 F | DIASTOLIC BLOOD PRESSURE: 76 MMHG | OXYGEN SATURATION: 96 %

## 2022-03-23 DIAGNOSIS — Z79.899 HIGH RISK MEDICATION USE: ICD-10-CM

## 2022-03-23 DIAGNOSIS — D72.819 LEUKOPENIA, UNSPECIFIED TYPE: ICD-10-CM

## 2022-03-23 DIAGNOSIS — D80.4 SELECTIVE IGM DEFICIENCY (HCC): ICD-10-CM

## 2022-03-23 DIAGNOSIS — M19.90 INFLAMMATORY ARTHRITIS: ICD-10-CM

## 2022-03-23 DIAGNOSIS — M47.819 SPONDYLOARTHRITIS: ICD-10-CM

## 2022-03-23 DIAGNOSIS — M67.432 GANGLION CYST OF DORSUM OF LEFT WRIST: Primary | ICD-10-CM

## 2022-03-23 PROCEDURE — 99215 OFFICE O/P EST HI 40 MIN: CPT | Performed by: INTERNAL MEDICINE

## 2022-03-23 PROCEDURE — 3078F DIAST BP <80 MM HG: CPT | Performed by: INTERNAL MEDICINE

## 2022-03-23 PROCEDURE — 20612 ASPIRATE/INJ GANGLION CYST: CPT | Performed by: INTERNAL MEDICINE

## 2022-03-23 PROCEDURE — 3008F BODY MASS INDEX DOCD: CPT | Performed by: INTERNAL MEDICINE

## 2022-03-23 PROCEDURE — 3074F SYST BP LT 130 MM HG: CPT | Performed by: INTERNAL MEDICINE

## 2022-03-23 RX ORDER — LIDOCAINE HYDROCHLORIDE 10 MG/ML
0.2 INJECTION, SOLUTION INFILTRATION; PERINEURAL ONCE
Status: COMPLETED | OUTPATIENT
Start: 2022-03-23 | End: 2022-03-23

## 2022-03-23 RX ORDER — METHYLPREDNISOLONE ACETATE 40 MG/ML
20 INJECTION, SUSPENSION INTRA-ARTICULAR; INTRALESIONAL; INTRAMUSCULAR; SOFT TISSUE ONCE
Status: COMPLETED | OUTPATIENT
Start: 2022-03-23 | End: 2022-03-23

## 2022-03-23 RX ORDER — SULFASALAZINE 500 MG/1
1000 TABLET ORAL 2 TIMES DAILY
Qty: 360 TABLET | Refills: 0 | Status: SHIPPED | OUTPATIENT
Start: 2022-03-23 | End: 2022-06-21

## 2022-03-23 RX ORDER — FOLIC ACID 1 MG/1
1 TABLET ORAL DAILY
Qty: 90 TABLET | Refills: 0 | Status: SHIPPED | OUTPATIENT
Start: 2022-03-23

## 2022-03-23 RX ADMIN — LIDOCAINE HYDROCHLORIDE 0.2 ML: 10 INJECTION, SOLUTION INFILTRATION; PERINEURAL at 18:08:00

## 2022-03-23 RX ADMIN — METHYLPREDNISOLONE ACETATE 20 MG: 40 INJECTION, SUSPENSION INTRA-ARTICULAR; INTRALESIONAL; INTRAMUSCULAR; SOFT TISSUE at 18:10:00

## 2022-03-24 NOTE — PROCEDURES
Ultrasound evaluation for Ganglion cyst     Procedure: Aspiration injection of ganglion cyst over left wrist  US Indication: For relief of pain and proper insertion of needle into a ganglion cyst     After obtaining consent and discussing pros, cons and alternatives including the risk for infection, joint damage, nerve and muscle injury the left wrist was cleansed under sterile procedure with hibiclens and alcohol. Then using a SonoSite Edge II with a 13-5 MHz transducer the distal wrist was evaluated in a transverse and longitudinal views. Abnormalities included cystic fluid accumulation was noted with loculations. This was directly under the radial artery. Then using sterile procedure, the ganglion was entered using an out of plane approach in order to avoid the radial artery,  Only one drop of clear fluid was aspirated and no significant decrease in size of cyst was achieved. This was performed under real time imaging. 20 mg of DepoMedrol and 1% were injected. There were no complications and post procedure followup instructions were given.     Paul Stiles DO  [facility]  03/23/2022

## 2022-04-05 ENCOUNTER — HOSPITAL ENCOUNTER (OUTPATIENT)
Dept: ULTRASOUND IMAGING | Age: 67
Discharge: HOME | End: 2022-04-05
Attending: INTERNAL MEDICINE
Payer: COMMERCIAL

## 2022-04-05 DIAGNOSIS — K76.0 NAFLD (NONALCOHOLIC FATTY LIVER DISEASE): ICD-10-CM

## 2022-04-05 PROCEDURE — 76981 USE PARENCHYMA: CPT | Performed by: INTERNAL MEDICINE

## 2022-04-05 PROCEDURE — 76705 ECHO EXAM OF ABDOMEN: CPT | Performed by: INTERNAL MEDICINE

## 2022-04-06 ENCOUNTER — TELEPHONE (OUTPATIENT)
Dept: FAMILY MEDICINE CLINIC | Facility: CLINIC | Age: 67
End: 2022-04-06

## 2022-04-06 ENCOUNTER — OFFICE VISIT (OUTPATIENT)
Dept: FAMILY MEDICINE CLINIC | Facility: CLINIC | Age: 67
End: 2022-04-06
Payer: COMMERCIAL

## 2022-04-06 VITALS
DIASTOLIC BLOOD PRESSURE: 70 MMHG | SYSTOLIC BLOOD PRESSURE: 130 MMHG | HEART RATE: 66 BPM | BODY MASS INDEX: 30.96 KG/M2 | WEIGHT: 206.63 LBS | RESPIRATION RATE: 18 BRPM | HEIGHT: 68.5 IN

## 2022-04-06 DIAGNOSIS — K76.0 NAFLD (NONALCOHOLIC FATTY LIVER DISEASE): ICD-10-CM

## 2022-04-06 DIAGNOSIS — M25.579 PAIN IN JOINT INVOLVING ANKLE AND FOOT, UNSPECIFIED LATERALITY: ICD-10-CM

## 2022-04-06 DIAGNOSIS — R03.0 ELEVATED BLOOD PRESSURE READING WITHOUT DIAGNOSIS OF HYPERTENSION: Primary | ICD-10-CM

## 2022-04-06 DIAGNOSIS — E78.2 MIXED HYPERLIPIDEMIA: ICD-10-CM

## 2022-04-06 DIAGNOSIS — F51.01 PRIMARY INSOMNIA: ICD-10-CM

## 2022-04-06 DIAGNOSIS — R07.89 LEFT CHEST PRESSURE: ICD-10-CM

## 2022-04-06 PROCEDURE — 3008F BODY MASS INDEX DOCD: CPT | Performed by: FAMILY MEDICINE

## 2022-04-06 PROCEDURE — 99214 OFFICE O/P EST MOD 30 MIN: CPT | Performed by: FAMILY MEDICINE

## 2022-04-06 PROCEDURE — 3075F SYST BP GE 130 - 139MM HG: CPT | Performed by: FAMILY MEDICINE

## 2022-04-06 PROCEDURE — 3078F DIAST BP <80 MM HG: CPT | Performed by: FAMILY MEDICINE

## 2022-04-06 RX ORDER — PREGABALIN 50 MG/1
50 CAPSULE ORAL 3 TIMES DAILY
Qty: 90 CAPSULE | Refills: 0 | Status: SHIPPED | OUTPATIENT
Start: 2022-04-06

## 2022-04-06 RX ORDER — MULTIVIT-MIN/IRON/FOLIC ACID/K 18-600-40
CAPSULE ORAL
COMMUNITY

## 2022-04-06 RX ORDER — ZOLPIDEM TARTRATE 12.5 MG/1
TABLET, FILM COATED, EXTENDED RELEASE ORAL
Qty: 90 TABLET | Refills: 1 | Status: SHIPPED | OUTPATIENT
Start: 2022-04-06

## 2022-04-06 NOTE — ASSESSMENT & PLAN NOTE
Discussed risks and benefits of a therapeutic trial of Lyrica and we will see how he does with my chart follow-up

## 2022-04-11 ENCOUNTER — TELEPHONE (OUTPATIENT)
Dept: FAMILY MEDICINE CLINIC | Facility: CLINIC | Age: 67
End: 2022-04-11

## 2022-04-11 ENCOUNTER — LAB ENCOUNTER (OUTPATIENT)
Dept: LAB | Age: 67
End: 2022-04-11
Attending: FAMILY MEDICINE
Payer: COMMERCIAL

## 2022-04-11 ENCOUNTER — PATIENT MESSAGE (OUTPATIENT)
Dept: FAMILY MEDICINE CLINIC | Facility: CLINIC | Age: 67
End: 2022-04-11

## 2022-04-11 DIAGNOSIS — R07.89 LEFT CHEST PRESSURE: ICD-10-CM

## 2022-04-11 LAB — SARS-COV-2 RNA RESP QL NAA+PROBE: NOT DETECTED

## 2022-04-11 NOTE — TELEPHONE ENCOUNTER
Steff Sharma was only able to give a 30 day refill on the   Zolpidem Tartrate ER 12.5 MG Oral Tab CR 90 tablet    they fill like a PA needs to be started they were unable to fill one out on there end. because the medication was filled for the 30 days   If we have  [phone] Tia Okeefe

## 2022-04-11 NOTE — TELEPHONE ENCOUNTER
Explained to Pt that insurance maryjane require him to use a mailorder or will only cover 30 pills a month.

## 2022-04-11 NOTE — TELEPHONE ENCOUNTER
From: Christiano Sawyer  To: Ana Piper MD  Sent: 4/11/2022 11:06 AM CDT  Subject: Zolpidem Tartrate ER 12.5 MG Tbcr    Good morning,    Can you please reach out to [facility] about my Ambien refill? They only would allow a 30 day supply, not the 90 day that the script was for. I spoke with the pharmacy and they stated that [facility] needed something from you to justify the script.     Thanks, Navjot Castorena

## 2022-04-13 NOTE — TELEPHONE ENCOUNTER
Completed additional form and submitted through [facility] to [phone]

## 2022-04-14 ENCOUNTER — HOSPITAL ENCOUNTER (OUTPATIENT)
Dept: CV DIAGNOSTICS | Facility: HOSPITAL | Age: 67
Discharge: HOME | End: 2022-04-14
Attending: FAMILY MEDICINE
Payer: COMMERCIAL

## 2022-04-14 DIAGNOSIS — R07.89 LEFT CHEST PRESSURE: ICD-10-CM

## 2022-04-14 PROCEDURE — 93017 CV STRESS TEST TRACING ONLY: CPT | Performed by: FAMILY MEDICINE

## 2022-04-14 PROCEDURE — 93018 CV STRESS TEST I&R ONLY: CPT | Performed by: FAMILY MEDICINE

## 2022-04-14 NOTE — TELEPHONE ENCOUNTER
Received a faxed response from [employer] regarding coverage of Zolpidem 12.5 mgs  Coverage granted 04/14/2022 ti 04/14/2023

## 2022-05-04 ENCOUNTER — LAB ENCOUNTER (OUTPATIENT)
Dept: LAB | Age: 67
End: 2022-05-04
Attending: INTERNAL MEDICINE
Payer: COMMERCIAL

## 2022-05-04 DIAGNOSIS — M47.819 SPONDYLOARTHRITIS: ICD-10-CM

## 2022-05-04 DIAGNOSIS — M19.90 INFLAMMATORY ARTHRITIS: ICD-10-CM

## 2022-05-04 DIAGNOSIS — Z79.899 HIGH RISK MEDICATION USE: ICD-10-CM

## 2022-05-04 DIAGNOSIS — D80.4 SELECTIVE IGM DEFICIENCY (HCC): ICD-10-CM

## 2022-05-04 DIAGNOSIS — D72.819 LEUKOPENIA, UNSPECIFIED TYPE: ICD-10-CM

## 2022-05-04 LAB
ALBUMIN SERPL-MCNC: 3.9 G/DL (ref 3.4–5)
ALBUMIN/GLOB SERPL: 1.5 {RATIO} (ref 1–2)
ALP LIVER SERPL-CCNC: 84 U/L
ALT SERPL-CCNC: 43 U/L
ANION GAP SERPL CALC-SCNC: 5 MMOL/L (ref 0–18)
AST SERPL-CCNC: 30 U/L (ref 15–37)
BASOPHILS # BLD AUTO: 0.02 X10(3) UL (ref 0–0.2)
BASOPHILS NFR BLD AUTO: 0.5 %
BILIRUB SERPL-MCNC: 1.1 MG/DL (ref 0.1–2)
BUN BLD-MCNC: 13 MG/DL (ref 7–18)
CALCIUM BLD-MCNC: 8.4 MG/DL (ref 8.5–10.1)
CHLORIDE SERPL-SCNC: 107 MMOL/L (ref 98–112)
CO2 SERPL-SCNC: 26 MMOL/L (ref 21–32)
CREAT BLD-MCNC: 1 MG/DL
EOSINOPHIL # BLD AUTO: 0.1 X10(3) UL (ref 0–0.7)
EOSINOPHIL NFR BLD AUTO: 2.7 %
ERYTHROCYTE [DISTWIDTH] IN BLOOD BY AUTOMATED COUNT: 12.5 %
FASTING STATUS PATIENT QL REPORTED: YES
GLOBULIN PLAS-MCNC: 2.6 G/DL (ref 2.8–4.4)
GLUCOSE BLD-MCNC: 105 MG/DL (ref 70–99)
HCT VFR BLD AUTO: 44.3 %
HGB BLD-MCNC: 14.7 G/DL
IGA SERPL-MCNC: 77.5 MG/DL (ref 70–312)
IGM SERPL-MCNC: 37.1 MG/DL (ref 43–279)
IMM GRANULOCYTES # BLD AUTO: 0.05 X10(3) UL (ref 0–1)
IMM GRANULOCYTES NFR BLD: 1.3 %
IMMUNOGLOBULIN PNL SER-MCNC: 850 MG/DL (ref 791–1643)
LYMPHOCYTES # BLD AUTO: 0.79 X10(3) UL (ref 1–4)
LYMPHOCYTES NFR BLD AUTO: 21.1 %
MCH RBC QN AUTO: 32 PG (ref 26–34)
MCHC RBC AUTO-ENTMCNC: 33.2 G/DL (ref 31–37)
MCV RBC AUTO: 96.3 FL
MONOCYTES # BLD AUTO: 0.71 X10(3) UL (ref 0.1–1)
MONOCYTES NFR BLD AUTO: 18.9 %
NEUTROPHILS # BLD AUTO: 2.08 X10 (3) UL (ref 1.5–7.7)
NEUTROPHILS # BLD AUTO: 2.08 X10(3) UL (ref 1.5–7.7)
NEUTROPHILS NFR BLD AUTO: 55.5 %
OSMOLALITY SERPL CALC.SUM OF ELEC: 286 MOSM/KG (ref 275–295)
PLATELET # BLD AUTO: 137 10(3)UL (ref 150–450)
POTASSIUM SERPL-SCNC: 4 MMOL/L (ref 3.5–5.1)
PROT SERPL-MCNC: 6.5 G/DL (ref 6.4–8.2)
RBC # BLD AUTO: 4.6 X10(6)UL
SODIUM SERPL-SCNC: 138 MMOL/L (ref 136–145)
WBC # BLD AUTO: 3.8 X10(3) UL (ref 4–11)

## 2022-05-04 PROCEDURE — 80053 COMPREHEN METABOLIC PANEL: CPT | Performed by: INTERNAL MEDICINE

## 2022-05-04 PROCEDURE — 82784 ASSAY IGA/IGD/IGG/IGM EACH: CPT | Performed by: INTERNAL MEDICINE

## 2022-05-04 PROCEDURE — 85025 COMPLETE CBC W/AUTO DIFF WBC: CPT | Performed by: INTERNAL MEDICINE

## 2022-05-13 ENCOUNTER — TELEPHONE (OUTPATIENT)
Dept: RHEUMATOLOGY | Facility: CLINIC | Age: 67
End: 2022-05-13

## 2022-05-13 NOTE — TELEPHONE ENCOUNTER
Called patient. Discussed test results in detail  Labs grossly stable and IgM values have increased. Patient has been tolerating sulfasalazine at 1000 mg twice daily. However he continues to have pain in his hands, wrists, knees, ankles and now has elbow pain. Overall feels like he is getting worsening pain. I recommend that he increase his sulfasalazine to 1500 mg in a.m. and continue thousand and p.m. for the next 1 to 2 weeks and if tolerating then increase to 1500 mg twice daily. He has plans to follow-up with immunology later today. Recommended patient update me after that visit as well as request Dr. Burke Weber to [address] [place] [address] me his note. Patient verbalized understanding of above instructions. No further questions at this time. Repeat labs in about 4 to 6 weeks.     Heri Restrepo DO  [facility]  5/13/2022        Labs received  05/2022  IgA, IgG normal  IgM 37.1 borderline low  CBC with WBC 3.8; hemoglobin 14.7; platelet 074  CMP grossly normal

## 2022-07-20 ENCOUNTER — LAB ENCOUNTER (OUTPATIENT)
Dept: LAB | Age: 67
End: 2022-07-20
Attending: INTERNAL MEDICINE
Payer: COMMERCIAL

## 2022-07-20 DIAGNOSIS — Z79.899 HIGH RISK MEDICATION USE: ICD-10-CM

## 2022-07-20 DIAGNOSIS — D80.4 SELECTIVE IGM DEFICIENCY (HCC): ICD-10-CM

## 2022-07-20 DIAGNOSIS — D72.819 LEUKOPENIA, UNSPECIFIED TYPE: ICD-10-CM

## 2022-07-20 DIAGNOSIS — M19.90 INFLAMMATORY ARTHRITIS: ICD-10-CM

## 2022-07-20 LAB
ALBUMIN SERPL-MCNC: 4.1 G/DL (ref 3.4–5)
ALBUMIN/GLOB SERPL: 1.4 {RATIO} (ref 1–2)
ALP LIVER SERPL-CCNC: 79 U/L
ALT SERPL-CCNC: 42 U/L
ANION GAP SERPL CALC-SCNC: 6 MMOL/L (ref 0–18)
AST SERPL-CCNC: 30 U/L (ref 15–37)
BASOPHILS # BLD AUTO: 0.02 X10(3) UL (ref 0–0.2)
BASOPHILS NFR BLD AUTO: 0.5 %
BILIRUB SERPL-MCNC: 1.5 MG/DL (ref 0.1–2)
BUN BLD-MCNC: 17 MG/DL (ref 7–18)
CALCIUM BLD-MCNC: 9.3 MG/DL (ref 8.5–10.1)
CHLORIDE SERPL-SCNC: 106 MMOL/L (ref 98–112)
CO2 SERPL-SCNC: 28 MMOL/L (ref 21–32)
CREAT BLD-MCNC: 1 MG/DL
CRP SERPL-MCNC: <0.29 MG/DL (ref ?–0.3)
EOSINOPHIL # BLD AUTO: 0.09 X10(3) UL (ref 0–0.7)
EOSINOPHIL NFR BLD AUTO: 2.5 %
ERYTHROCYTE [DISTWIDTH] IN BLOOD BY AUTOMATED COUNT: 12.6 %
ERYTHROCYTE [SEDIMENTATION RATE] IN BLOOD: 11 MM/HR
FASTING STATUS PATIENT QL REPORTED: YES
GLOBULIN PLAS-MCNC: 2.9 G/DL (ref 2.8–4.4)
GLUCOSE BLD-MCNC: 113 MG/DL (ref 70–99)
HCT VFR BLD AUTO: 45.3 %
HGB BLD-MCNC: 15 G/DL
IGA SERPL-MCNC: 74.9 MG/DL (ref 70–312)
IGM SERPL-MCNC: 29.4 MG/DL (ref 43–279)
IMM GRANULOCYTES # BLD AUTO: 0.05 X10(3) UL (ref 0–1)
IMM GRANULOCYTES NFR BLD: 1.4 %
IMMUNOGLOBULIN PNL SER-MCNC: 854 MG/DL (ref 791–1643)
LYMPHOCYTES # BLD AUTO: 0.67 X10(3) UL (ref 1–4)
LYMPHOCYTES NFR BLD AUTO: 18.3 %
MCH RBC QN AUTO: 32.3 PG (ref 26–34)
MCHC RBC AUTO-ENTMCNC: 33.1 G/DL (ref 31–37)
MCV RBC AUTO: 97.4 FL
MONOCYTES # BLD AUTO: 0.66 X10(3) UL (ref 0.1–1)
MONOCYTES NFR BLD AUTO: 18 %
NEUTROPHILS # BLD AUTO: 2.17 X10 (3) UL (ref 1.5–7.7)
NEUTROPHILS # BLD AUTO: 2.17 X10(3) UL (ref 1.5–7.7)
NEUTROPHILS NFR BLD AUTO: 59.3 %
OSMOLALITY SERPL CALC.SUM OF ELEC: 292 MOSM/KG (ref 275–295)
PLATELET # BLD AUTO: 145 10(3)UL (ref 150–450)
POTASSIUM SERPL-SCNC: 4.6 MMOL/L (ref 3.5–5.1)
PROT SERPL-MCNC: 7 G/DL (ref 6.4–8.2)
RBC # BLD AUTO: 4.65 X10(6)UL
SODIUM SERPL-SCNC: 140 MMOL/L (ref 136–145)
WBC # BLD AUTO: 3.7 X10(3) UL (ref 4–11)

## 2022-07-20 PROCEDURE — 80053 COMPREHEN METABOLIC PANEL: CPT | Performed by: INTERNAL MEDICINE

## 2022-07-20 PROCEDURE — 85652 RBC SED RATE AUTOMATED: CPT | Performed by: INTERNAL MEDICINE

## 2022-07-20 PROCEDURE — 86140 C-REACTIVE PROTEIN: CPT | Performed by: INTERNAL MEDICINE

## 2022-07-20 PROCEDURE — 85025 COMPLETE CBC W/AUTO DIFF WBC: CPT | Performed by: INTERNAL MEDICINE

## 2022-07-20 PROCEDURE — 82784 ASSAY IGA/IGD/IGG/IGM EACH: CPT | Performed by: INTERNAL MEDICINE

## 2022-07-26 ENCOUNTER — OFFICE VISIT (OUTPATIENT)
Dept: RHEUMATOLOGY | Facility: CLINIC | Age: 67
End: 2022-07-26
Payer: COMMERCIAL

## 2022-07-26 ENCOUNTER — HOSPITAL ENCOUNTER (OUTPATIENT)
Dept: GENERAL RADIOLOGY | Age: 67
Discharge: HOME | End: 2022-07-26
Attending: INTERNAL MEDICINE
Payer: COMMERCIAL

## 2022-07-26 VITALS
RESPIRATION RATE: 16 BRPM | DIASTOLIC BLOOD PRESSURE: 76 MMHG | HEART RATE: 72 BPM | BODY MASS INDEX: 30.35 KG/M2 | HEIGHT: 70 IN | WEIGHT: 212 LBS | SYSTOLIC BLOOD PRESSURE: 148 MMHG | OXYGEN SATURATION: 94 %

## 2022-07-26 DIAGNOSIS — D72.819 LEUKOPENIA, UNSPECIFIED TYPE: ICD-10-CM

## 2022-07-26 DIAGNOSIS — M19.90 INFLAMMATORY ARTHRITIS: ICD-10-CM

## 2022-07-26 DIAGNOSIS — M25.50 POLYARTHRALGIA: ICD-10-CM

## 2022-07-26 DIAGNOSIS — R06.02 SHORTNESS OF BREATH: ICD-10-CM

## 2022-07-26 DIAGNOSIS — M79.2 NEUROPATHIC PAIN: ICD-10-CM

## 2022-07-26 DIAGNOSIS — M19.90 INFLAMMATORY ARTHRITIS: Primary | ICD-10-CM

## 2022-07-26 DIAGNOSIS — D80.4 SELECTIVE IGM DEFICIENCY (HCC): ICD-10-CM

## 2022-07-26 PROCEDURE — 3008F BODY MASS INDEX DOCD: CPT | Performed by: INTERNAL MEDICINE

## 2022-07-26 PROCEDURE — 3078F DIAST BP <80 MM HG: CPT | Performed by: INTERNAL MEDICINE

## 2022-07-26 PROCEDURE — 3077F SYST BP >= 140 MM HG: CPT | Performed by: INTERNAL MEDICINE

## 2022-07-26 PROCEDURE — 71046 X-RAY EXAM CHEST 2 VIEWS: CPT | Performed by: INTERNAL MEDICINE

## 2022-07-26 PROCEDURE — 99215 OFFICE O/P EST HI 40 MIN: CPT | Performed by: INTERNAL MEDICINE

## 2022-07-26 RX ORDER — AMITRIPTYLINE HYDROCHLORIDE 25 MG/1
50 TABLET, FILM COATED ORAL NIGHTLY
Qty: 180 TABLET | Refills: 0 | Status: SHIPPED | OUTPATIENT
Start: 2022-07-26

## 2022-07-26 RX ORDER — SULFASALAZINE 500 MG/1
TABLET ORAL 2 TIMES DAILY
COMMUNITY

## 2022-07-27 ENCOUNTER — TELEPHONE (OUTPATIENT)
Dept: RHEUMATOLOGY | Facility: CLINIC | Age: 67
End: 2022-07-27

## 2022-07-27 DIAGNOSIS — R93.89 ABNORMAL CXR: Primary | ICD-10-CM

## 2022-07-27 DIAGNOSIS — R06.02 SHORTNESS OF BREATH: ICD-10-CM

## 2022-08-01 ENCOUNTER — TELEPHONE (OUTPATIENT)
Dept: RHEUMATOLOGY | Facility: CLINIC | Age: 67
End: 2022-08-01

## 2022-08-01 NOTE — TELEPHONE ENCOUNTER
[facility] phoned office, pt scheduled for CT with and without contrast Chest on 8/2.  Radiologist recommending just Ct with contrast.

## 2022-08-11 ENCOUNTER — OFFICE VISIT (OUTPATIENT)
Dept: FAMILY MEDICINE CLINIC | Facility: CLINIC | Age: 67
End: 2022-08-11
Payer: COMMERCIAL

## 2022-08-11 VITALS
HEIGHT: 69.5 IN | WEIGHT: 211.63 LBS | DIASTOLIC BLOOD PRESSURE: 82 MMHG | RESPIRATION RATE: 18 BRPM | SYSTOLIC BLOOD PRESSURE: 136 MMHG | HEART RATE: 74 BPM | BODY MASS INDEX: 30.64 KG/M2

## 2022-08-11 DIAGNOSIS — J98.11 ATELECTASIS: Primary | ICD-10-CM

## 2022-08-11 PROCEDURE — 3075F SYST BP GE 130 - 139MM HG: CPT | Performed by: FAMILY MEDICINE

## 2022-08-11 PROCEDURE — 3008F BODY MASS INDEX DOCD: CPT | Performed by: FAMILY MEDICINE

## 2022-08-11 PROCEDURE — 99213 OFFICE O/P EST LOW 20 MIN: CPT | Performed by: FAMILY MEDICINE

## 2022-08-11 PROCEDURE — 3079F DIAST BP 80-89 MM HG: CPT | Performed by: FAMILY MEDICINE

## 2022-08-11 RX ORDER — FLUTICASONE PROPIONATE AND SALMETEROL 113; 14 UG/1; UG/1
1 POWDER, METERED RESPIRATORY (INHALATION) 2 TIMES DAILY
Qty: 1 EACH | Refills: 0 | COMMUNITY
Start: 2022-08-11

## 2022-08-29 ENCOUNTER — OFFICE VISIT (OUTPATIENT)
Dept: SURGERY | Facility: CLINIC | Age: 67
End: 2022-08-29
Payer: COMMERCIAL

## 2022-08-29 ENCOUNTER — TELEPHONE (OUTPATIENT)
Dept: FAMILY MEDICINE CLINIC | Facility: CLINIC | Age: 67
End: 2022-08-29

## 2022-08-29 VITALS
SYSTOLIC BLOOD PRESSURE: 166 MMHG | BODY MASS INDEX: 30 KG/M2 | OXYGEN SATURATION: 97 % | RESPIRATION RATE: 16 BRPM | HEART RATE: 73 BPM | DIASTOLIC BLOOD PRESSURE: 94 MMHG | WEIGHT: 205 LBS | TEMPERATURE: 98 F

## 2022-08-29 DIAGNOSIS — C43.72 MALIGNANT MELANOMA OF LEFT LOWER LEG (HCC): Primary | ICD-10-CM

## 2022-08-29 DIAGNOSIS — L98.9 SKIN LESION OF LEFT LOWER LIMB: ICD-10-CM

## 2022-08-29 PROCEDURE — 88305 TISSUE EXAM BY PATHOLOGIST: CPT | Performed by: SURGERY

## 2022-08-29 NOTE — TELEPHONE ENCOUNTER
TC from patient. He saw Dr. David Clemens M.D. today. It looks like he needs a sentinel node biopsy for melanoma. He last saw Dr. Melodie Kraft M.D. 8/11/22.    Will patient need preoperative exam?   Please call patient back at [phone]

## 2022-08-30 DIAGNOSIS — C43.72 MALIGNANT MELANOMA OF LEFT LOWER LEG (HCC): Primary | ICD-10-CM

## 2022-08-31 NOTE — TELEPHONE ENCOUNTER
I updated 8/11 note with preop clearance. He had normal stress test and normal labs earlier this year, he is coming in next month for his annual wellness visit as well.     Okay to notify him that he should be clear for the procedure on September 9

## 2022-09-06 ENCOUNTER — OFFICE VISIT (OUTPATIENT)
Dept: SURGERY | Facility: CLINIC | Age: 67
End: 2022-09-06
Payer: COMMERCIAL

## 2022-09-06 ENCOUNTER — LAB ENCOUNTER (OUTPATIENT)
Dept: LAB | Facility: HOSPITAL | Age: 67
End: 2022-09-06
Attending: SURGERY
Payer: COMMERCIAL

## 2022-09-06 VITALS
DIASTOLIC BLOOD PRESSURE: 84 MMHG | TEMPERATURE: 98 F | OXYGEN SATURATION: 97 % | RESPIRATION RATE: 18 BRPM | SYSTOLIC BLOOD PRESSURE: 169 MMHG | HEART RATE: 70 BPM

## 2022-09-06 DIAGNOSIS — Z01.812 ENCOUNTER FOR PREPROCEDURE SCREENING LABORATORY TESTING FOR COVID-19: ICD-10-CM

## 2022-09-06 DIAGNOSIS — Z85.3 HISTORY OF RIGHT BREAST CANCER: ICD-10-CM

## 2022-09-06 DIAGNOSIS — R22.2 LOCALIZED SWELLING OF CHEST WALL: Primary | ICD-10-CM

## 2022-09-06 DIAGNOSIS — I89.0 LYMPHEDEMA: ICD-10-CM

## 2022-09-06 DIAGNOSIS — Z20.822 ENCOUNTER FOR PREPROCEDURE SCREENING LABORATORY TESTING FOR COVID-19: ICD-10-CM

## 2022-09-06 LAB — SARS-COV-2 RNA RESP QL NAA+PROBE: NOT DETECTED

## 2022-09-06 PROCEDURE — 99213 OFFICE O/P EST LOW 20 MIN: CPT | Performed by: SURGERY

## 2022-09-06 PROCEDURE — 3077F SYST BP >= 140 MM HG: CPT | Performed by: SURGERY

## 2022-09-06 PROCEDURE — 3079F DIAST BP 80-89 MM HG: CPT | Performed by: SURGERY

## 2022-09-06 RX ORDER — MULTIVITAMIN
1 TABLET ORAL DAILY
COMMUNITY

## 2022-09-07 ENCOUNTER — TELEPHONE (OUTPATIENT)
Dept: SURGERY | Facility: CLINIC | Age: 67
End: 2022-09-07

## 2022-09-08 ENCOUNTER — TELEPHONE (OUTPATIENT)
Dept: SURGERY | Facility: CLINIC | Age: 67
End: 2022-09-08

## 2022-09-08 ENCOUNTER — LAB ENCOUNTER (OUTPATIENT)
Dept: LAB | Facility: HOSPITAL | Age: 67
End: 2022-09-08
Attending: SURGERY
Payer: COMMERCIAL

## 2022-09-08 ENCOUNTER — HOSPITAL ENCOUNTER (OUTPATIENT)
Dept: NUCLEAR MEDICINE | Facility: HOSPITAL | Age: 67
Discharge: HOME | End: 2022-09-08
Attending: SURGERY
Payer: COMMERCIAL

## 2022-09-08 ENCOUNTER — EKG ENCOUNTER (OUTPATIENT)
Dept: LAB | Facility: HOSPITAL | Age: 67
End: 2022-09-08
Attending: SURGERY
Payer: COMMERCIAL

## 2022-09-08 ENCOUNTER — ANESTHESIA EVENT (OUTPATIENT)
Dept: SURGERY | Facility: HOSPITAL | Age: 67
End: 2022-09-08
Payer: COMMERCIAL

## 2022-09-08 DIAGNOSIS — C43.9 MELANOMA (HCC): Primary | ICD-10-CM

## 2022-09-08 DIAGNOSIS — Z01.818 PRE-OP TESTING: ICD-10-CM

## 2022-09-08 DIAGNOSIS — C43.72 MALIGNANT MELANOMA OF LEFT LOWER LEG (HCC): ICD-10-CM

## 2022-09-08 DIAGNOSIS — C43.72 MALIGNANT MELANOMA OF LEFT LOWER LEG (HCC): Primary | ICD-10-CM

## 2022-09-08 LAB
ATRIAL RATE: 66 BPM
P AXIS: 50 DEGREES
P-R INTERVAL: 186 MS
Q-T INTERVAL: 378 MS
QRS DURATION: 88 MS
QTC CALCULATION (BEZET): 396 MS
R AXIS: 29 DEGREES
T AXIS: 34 DEGREES
VENTRICULAR RATE: 66 BPM

## 2022-09-08 PROCEDURE — 93010 ELECTROCARDIOGRAM REPORT: CPT | Performed by: INTERNAL MEDICINE

## 2022-09-08 PROCEDURE — 78195 LYMPH SYSTEM IMAGING: CPT | Performed by: SURGERY

## 2022-09-08 PROCEDURE — 88321 CONSLTJ&REPRT SLD PREP ELSWR: CPT

## 2022-09-08 PROCEDURE — 93005 ELECTROCARDIOGRAM TRACING: CPT

## 2022-09-08 NOTE — TELEPHONE ENCOUNTER
Patient was called and notified that we have not received his outside slides. Patient was given the option to post pone his surgery or to continue without or Pathologist reviewing the slides. Patient has decided to move forward with the surgery. Patient state he will call his dermatologist and see what he can do to get the slides.

## 2022-09-08 NOTE — TELEPHONE ENCOUNTER
Patient was called and LVM that we have not received his outside slides. Patient was asked to call out office back to discuss.

## 2022-09-08 NOTE — IMAGING NOTE
1612: Spoke with inpatient pharmacy at this time. Requested confirmation regarding return of EMLA cream.  Requested Cristopher Leyden receive credit for charge of EMLA  as cream was not administered. Ms. Otilia Adame declined.

## 2022-09-09 ENCOUNTER — HOSPITAL ENCOUNTER (OUTPATIENT)
Facility: HOSPITAL | Age: 67
Setting detail: HOSPITAL OUTPATIENT SURGERY
Discharge: HOME | End: 2022-09-09
Attending: SURGERY | Admitting: SURGERY
Payer: COMMERCIAL

## 2022-09-09 ENCOUNTER — ANESTHESIA (OUTPATIENT)
Dept: SURGERY | Facility: HOSPITAL | Age: 67
End: 2022-09-09
Payer: COMMERCIAL

## 2022-09-09 VITALS
HEIGHT: 69.5 IN | HEART RATE: 66 BPM | WEIGHT: 205 LBS | RESPIRATION RATE: 20 BRPM | OXYGEN SATURATION: 94 % | BODY MASS INDEX: 29.68 KG/M2 | SYSTOLIC BLOOD PRESSURE: 159 MMHG | TEMPERATURE: 97 F | DIASTOLIC BLOOD PRESSURE: 93 MMHG

## 2022-09-09 DIAGNOSIS — Z01.812 ENCOUNTER FOR PREPROCEDURE SCREENING LABORATORY TESTING FOR COVID-19: Primary | ICD-10-CM

## 2022-09-09 DIAGNOSIS — Z01.818 PRE-OP TESTING: ICD-10-CM

## 2022-09-09 DIAGNOSIS — Z20.822 ENCOUNTER FOR PREPROCEDURE SCREENING LABORATORY TESTING FOR COVID-19: Primary | ICD-10-CM

## 2022-09-09 DIAGNOSIS — C43.72 MALIGNANT MELANOMA OF LEFT LOWER LEG (HCC): ICD-10-CM

## 2022-09-09 LAB
ALBUMIN SERPL-MCNC: 3.7 G/DL (ref 3.4–5)
ALBUMIN/GLOB SERPL: 1.2 {RATIO} (ref 1–2)
ALP LIVER SERPL-CCNC: 79 U/L
ALT SERPL-CCNC: 43 U/L
ANION GAP SERPL CALC-SCNC: 5 MMOL/L (ref 0–18)
AST SERPL-CCNC: 39 U/L (ref 15–37)
BASOPHILS # BLD AUTO: 0.03 X10(3) UL (ref 0–0.2)
BASOPHILS NFR BLD AUTO: 0.8 %
BILIRUB SERPL-MCNC: 2.3 MG/DL (ref 0.1–2)
BUN BLD-MCNC: 13 MG/DL (ref 7–18)
CALCIUM BLD-MCNC: 8.9 MG/DL (ref 8.5–10.1)
CHLORIDE SERPL-SCNC: 109 MMOL/L (ref 98–112)
CO2 SERPL-SCNC: 26 MMOL/L (ref 21–32)
CREAT BLD-MCNC: 0.96 MG/DL
EOSINOPHIL # BLD AUTO: 0.15 X10(3) UL (ref 0–0.7)
EOSINOPHIL NFR BLD AUTO: 3.9 %
ERYTHROCYTE [DISTWIDTH] IN BLOOD BY AUTOMATED COUNT: 12.2 %
GFR SERPLBLD BASED ON 1.73 SQ M-ARVRAT: 88 ML/MIN/1.73M2 (ref 60–?)
GLOBULIN PLAS-MCNC: 3 G/DL (ref 2.8–4.4)
GLUCOSE BLD-MCNC: 88 MG/DL (ref 70–99)
HBV SURFACE AG SER-ACNC: <0.1 [IU]/L
HBV SURFACE AG SERPL QL IA: NONREACTIVE
HCT VFR BLD AUTO: 44.1 %
HCV AB SERPL QL IA: NONREACTIVE
HGB BLD-MCNC: 15.3 G/DL
HIV 1+2 AB+HIV1 P24 AG SERPL QL IA: NONREACTIVE
IMM GRANULOCYTES # BLD AUTO: 0.04 X10(3) UL (ref 0–1)
IMM GRANULOCYTES NFR BLD: 1 %
LYMPHOCYTES # BLD AUTO: 0.75 X10(3) UL (ref 1–4)
LYMPHOCYTES NFR BLD AUTO: 19.6 %
MCH RBC QN AUTO: 32.5 PG (ref 26–34)
MCHC RBC AUTO-ENTMCNC: 34.7 G/DL (ref 31–37)
MCV RBC AUTO: 93.6 FL
MONOCYTES # BLD AUTO: 0.64 X10(3) UL (ref 0.1–1)
MONOCYTES NFR BLD AUTO: 16.7 %
NEUTROPHILS # BLD AUTO: 2.22 X10 (3) UL (ref 1.5–7.7)
NEUTROPHILS # BLD AUTO: 2.22 X10(3) UL (ref 1.5–7.7)
NEUTROPHILS NFR BLD AUTO: 58 %
OSMOLALITY SERPL CALC.SUM OF ELEC: 290 MOSM/KG (ref 275–295)
PLATELET # BLD AUTO: 148 10(3)UL (ref 150–450)
POTASSIUM SERPL-SCNC: 4.1 MMOL/L (ref 3.5–5.1)
PROT SERPL-MCNC: 6.7 G/DL (ref 6.4–8.2)
RBC # BLD AUTO: 4.71 X10(6)UL
SODIUM SERPL-SCNC: 140 MMOL/L (ref 136–145)
WBC # BLD AUTO: 3.8 X10(3) UL (ref 4–11)

## 2022-09-09 PROCEDURE — 87340 HEPATITIS B SURFACE AG IA: CPT | Performed by: PREVENTIVE MEDICINE

## 2022-09-09 PROCEDURE — 07BJ0ZX EXCISION OF LEFT INGUINAL LYMPHATIC, OPEN APPROACH, DIAGNOSTIC: ICD-10-PCS | Performed by: SURGERY

## 2022-09-09 PROCEDURE — 85025 COMPLETE CBC W/AUTO DIFF WBC: CPT

## 2022-09-09 PROCEDURE — 0JBP0ZX EXCISION OF LEFT LOWER LEG SUBCUTANEOUS TISSUE AND FASCIA, OPEN APPROACH, DIAGNOSTIC: ICD-10-PCS | Performed by: SURGERY

## 2022-09-09 PROCEDURE — 88307 TISSUE EXAM BY PATHOLOGIST: CPT | Performed by: SURGERY

## 2022-09-09 PROCEDURE — 80053 COMPREHEN METABOLIC PANEL: CPT

## 2022-09-09 PROCEDURE — 86701 HIV-1ANTIBODY: CPT | Performed by: PREVENTIVE MEDICINE

## 2022-09-09 PROCEDURE — 88305 TISSUE EXAM BY PATHOLOGIST: CPT | Performed by: SURGERY

## 2022-09-09 PROCEDURE — 86803 HEPATITIS C AB TEST: CPT | Performed by: PREVENTIVE MEDICINE

## 2022-09-09 PROCEDURE — 88341 IMHCHEM/IMCYTCHM EA ADD ANTB: CPT | Performed by: SURGERY

## 2022-09-09 PROCEDURE — 88342 IMHCHEM/IMCYTCHM 1ST ANTB: CPT | Performed by: SURGERY

## 2022-09-09 RX ORDER — ACETAMINOPHEN 500 MG
1000 TABLET ORAL ONCE AS NEEDED
Status: COMPLETED | OUTPATIENT
Start: 2022-09-09 | End: 2022-09-09

## 2022-09-09 RX ORDER — SODIUM CHLORIDE, SODIUM LACTATE, POTASSIUM CHLORIDE, CALCIUM CHLORIDE 600; 310; 30; 20 MG/100ML; MG/100ML; MG/100ML; MG/100ML
INJECTION, SOLUTION INTRAVENOUS CONTINUOUS
Status: DISCONTINUED | OUTPATIENT
Start: 2022-09-09 | End: 2022-09-09

## 2022-09-09 RX ORDER — HYDROMORPHONE HYDROCHLORIDE 1 MG/ML
0.2 INJECTION, SOLUTION INTRAMUSCULAR; INTRAVENOUS; SUBCUTANEOUS EVERY 5 MIN PRN
Status: DISCONTINUED | OUTPATIENT
Start: 2022-09-09 | End: 2022-09-09

## 2022-09-09 RX ORDER — CEFAZOLIN SODIUM/WATER 2 G/20 ML
2 SYRINGE (ML) INTRAVENOUS ONCE
Status: COMPLETED | OUTPATIENT
Start: 2022-09-09 | End: 2022-09-09

## 2022-09-09 RX ORDER — ISOSULFAN BLUE 50 MG/5ML
INJECTION, SOLUTION SUBCUTANEOUS AS NEEDED
Status: DISCONTINUED | OUTPATIENT
Start: 2022-09-09 | End: 2022-09-09 | Stop reason: HOSPADM

## 2022-09-09 RX ORDER — DIPHENHYDRAMINE HYDROCHLORIDE 50 MG/ML
12.5 INJECTION INTRAMUSCULAR; INTRAVENOUS AS NEEDED
Status: DISCONTINUED | OUTPATIENT
Start: 2022-09-09 | End: 2022-09-09

## 2022-09-09 RX ORDER — ONDANSETRON 2 MG/ML
4 INJECTION INTRAMUSCULAR; INTRAVENOUS EVERY 6 HOURS PRN
Status: DISCONTINUED | OUTPATIENT
Start: 2022-09-09 | End: 2022-09-09

## 2022-09-09 RX ORDER — HYDROCODONE BITARTRATE AND ACETAMINOPHEN 5; 325 MG/1; MG/1
1 TABLET ORAL ONCE AS NEEDED
Status: COMPLETED | OUTPATIENT
Start: 2022-09-09 | End: 2022-09-09

## 2022-09-09 RX ORDER — ONDANSETRON 2 MG/ML
INJECTION INTRAMUSCULAR; INTRAVENOUS AS NEEDED
Status: DISCONTINUED | OUTPATIENT
Start: 2022-09-09 | End: 2022-09-09 | Stop reason: SURG

## 2022-09-09 RX ORDER — LIDOCAINE HYDROCHLORIDE 10 MG/ML
INJECTION, SOLUTION EPIDURAL; INFILTRATION; INTRACAUDAL; PERINEURAL AS NEEDED
Status: DISCONTINUED | OUTPATIENT
Start: 2022-09-09 | End: 2022-09-09 | Stop reason: SURG

## 2022-09-09 RX ORDER — HYDROMORPHONE HYDROCHLORIDE 1 MG/ML
0.4 INJECTION, SOLUTION INTRAMUSCULAR; INTRAVENOUS; SUBCUTANEOUS EVERY 5 MIN PRN
Status: DISCONTINUED | OUTPATIENT
Start: 2022-09-09 | End: 2022-09-09

## 2022-09-09 RX ORDER — METOCLOPRAMIDE HYDROCHLORIDE 5 MG/ML
10 INJECTION INTRAMUSCULAR; INTRAVENOUS EVERY 8 HOURS PRN
Status: DISCONTINUED | OUTPATIENT
Start: 2022-09-09 | End: 2022-09-09

## 2022-09-09 RX ORDER — ACETAMINOPHEN 500 MG
1000 TABLET ORAL ONCE
Status: DISCONTINUED | OUTPATIENT
Start: 2022-09-09 | End: 2022-09-09 | Stop reason: HOSPADM

## 2022-09-09 RX ORDER — NALOXONE HYDROCHLORIDE 0.4 MG/ML
80 INJECTION, SOLUTION INTRAMUSCULAR; INTRAVENOUS; SUBCUTANEOUS AS NEEDED
Status: DISCONTINUED | OUTPATIENT
Start: 2022-09-09 | End: 2022-09-09

## 2022-09-09 RX ORDER — HYDROMORPHONE HYDROCHLORIDE 1 MG/ML
0.6 INJECTION, SOLUTION INTRAMUSCULAR; INTRAVENOUS; SUBCUTANEOUS EVERY 5 MIN PRN
Status: DISCONTINUED | OUTPATIENT
Start: 2022-09-09 | End: 2022-09-09

## 2022-09-09 RX ORDER — BUPIVACAINE HYDROCHLORIDE 2.5 MG/ML
INJECTION, SOLUTION EPIDURAL; INFILTRATION; INTRACAUDAL AS NEEDED
Status: DISCONTINUED | OUTPATIENT
Start: 2022-09-09 | End: 2022-09-09 | Stop reason: HOSPADM

## 2022-09-09 RX ORDER — ALBUTEROL SULFATE 2.5 MG/3ML
2.5 SOLUTION RESPIRATORY (INHALATION) AS NEEDED
Status: DISCONTINUED | OUTPATIENT
Start: 2022-09-09 | End: 2022-09-09

## 2022-09-09 RX ORDER — LABETALOL HYDROCHLORIDE 5 MG/ML
5 INJECTION, SOLUTION INTRAVENOUS EVERY 5 MIN PRN
Status: DISCONTINUED | OUTPATIENT
Start: 2022-09-09 | End: 2022-09-09

## 2022-09-09 RX ORDER — TRAMADOL HYDROCHLORIDE 50 MG/1
50 TABLET ORAL EVERY 6 HOURS PRN
Qty: 15 TABLET | Refills: 0 | Status: SHIPPED | OUTPATIENT
Start: 2022-09-09

## 2022-09-09 RX ORDER — SCOLOPAMINE TRANSDERMAL SYSTEM 1 MG/1
1 PATCH, EXTENDED RELEASE TRANSDERMAL ONCE
Status: DISCONTINUED | OUTPATIENT
Start: 2022-09-09 | End: 2022-09-09 | Stop reason: HOSPADM

## 2022-09-09 RX ORDER — DEXAMETHASONE SODIUM PHOSPHATE 4 MG/ML
VIAL (ML) INJECTION AS NEEDED
Status: DISCONTINUED | OUTPATIENT
Start: 2022-09-09 | End: 2022-09-09 | Stop reason: SURG

## 2022-09-09 RX ORDER — GLYCOPYRROLATE 0.2 MG/ML
INJECTION, SOLUTION INTRAMUSCULAR; INTRAVENOUS AS NEEDED
Status: DISCONTINUED | OUTPATIENT
Start: 2022-09-09 | End: 2022-09-09 | Stop reason: SURG

## 2022-09-09 RX ORDER — EPHEDRINE SULFATE 50 MG/ML
INJECTION INTRAVENOUS AS NEEDED
Status: DISCONTINUED | OUTPATIENT
Start: 2022-09-09 | End: 2022-09-09 | Stop reason: SURG

## 2022-09-09 RX ORDER — HYDROMORPHONE HYDROCHLORIDE 1 MG/ML
INJECTION, SOLUTION INTRAMUSCULAR; INTRAVENOUS; SUBCUTANEOUS
Status: COMPLETED
Start: 2022-09-09 | End: 2022-09-09

## 2022-09-09 RX ORDER — MEPERIDINE HYDROCHLORIDE 25 MG/ML
12.5 INJECTION INTRAMUSCULAR; INTRAVENOUS; SUBCUTANEOUS AS NEEDED
Status: DISCONTINUED | OUTPATIENT
Start: 2022-09-09 | End: 2022-09-09

## 2022-09-09 RX ORDER — NEOSTIGMINE METHYLSULFATE 1 MG/ML
INJECTION, SOLUTION INTRAVENOUS AS NEEDED
Status: DISCONTINUED | OUTPATIENT
Start: 2022-09-09 | End: 2022-09-09 | Stop reason: SURG

## 2022-09-09 RX ORDER — HYDROCODONE BITARTRATE AND ACETAMINOPHEN 5; 325 MG/1; MG/1
2 TABLET ORAL ONCE AS NEEDED
Status: COMPLETED | OUTPATIENT
Start: 2022-09-09 | End: 2022-09-09

## 2022-09-09 RX ORDER — ROCURONIUM BROMIDE 10 MG/ML
INJECTION, SOLUTION INTRAVENOUS AS NEEDED
Status: DISCONTINUED | OUTPATIENT
Start: 2022-09-09 | End: 2022-09-09 | Stop reason: SURG

## 2022-09-09 RX ADMIN — EPHEDRINE SULFATE 10 MG: 50 INJECTION INTRAVENOUS at 08:38:00

## 2022-09-09 RX ADMIN — DEXAMETHASONE SODIUM PHOSPHATE 4 MG: 4 MG/ML VIAL (ML) INJECTION at 07:39:00

## 2022-09-09 RX ADMIN — CEFAZOLIN SODIUM/WATER 2 G: 2 G/20 ML SYRINGE (ML) INTRAVENOUS at 07:40:00

## 2022-09-09 RX ADMIN — ROCURONIUM BROMIDE 30 MG: 10 INJECTION, SOLUTION INTRAVENOUS at 07:33:00

## 2022-09-09 RX ADMIN — SODIUM CHLORIDE, SODIUM LACTATE, POTASSIUM CHLORIDE, CALCIUM CHLORIDE: 600; 310; 30; 20 INJECTION, SOLUTION INTRAVENOUS at 07:31:00

## 2022-09-09 RX ADMIN — GLYCOPYRROLATE 0.4 MG: 0.2 INJECTION, SOLUTION INTRAMUSCULAR; INTRAVENOUS at 09:55:00

## 2022-09-09 RX ADMIN — ONDANSETRON 4 MG: 2 INJECTION INTRAMUSCULAR; INTRAVENOUS at 09:48:00

## 2022-09-09 RX ADMIN — NEOSTIGMINE METHYLSULFATE 3 MG: 1 INJECTION, SOLUTION INTRAVENOUS at 09:55:00

## 2022-09-09 RX ADMIN — LIDOCAINE HYDROCHLORIDE 50 MG: 10 INJECTION, SOLUTION EPIDURAL; INFILTRATION; INTRACAUDAL; PERINEURAL at 07:33:00

## 2022-09-09 RX ADMIN — EPHEDRINE SULFATE 10 MG: 50 INJECTION INTRAVENOUS at 08:11:00

## 2022-09-09 NOTE — ANESTHESIA PROCEDURE NOTES
Airway  Date/Time: 9/9/2022 7:37 AM  Urgency: elective    Airway not difficult    General Information and Staff    Patient location during procedure: OR  Anesthesiologist: Varun Anthony MD  Resident/CRNA: Moy Bush CRNA  Performed: CRNA     Indications and Patient Condition  Indications for airway management: anesthesia  Spontaneous Ventilation: absent  Sedation level: deep  Preoxygenated: yes  Patient position: sniffing  Mask difficulty assessment: 1 - vent by mask    Final Airway Details  Final airway type: endotracheal airway      Successful airway: ETT  Cuffed: yes   Successful intubation technique: direct laryngoscopy  Endotracheal tube insertion site: oral  Blade: Perri  Blade size: #4  ETT size (mm): 7.5    Cormack-Lehane Classification: grade IIB - view of arytenoids or posterior of glottis only  Placement verified by: chest auscultation and capnometry   Measured from: lips  ETT to lips (cm): 23  Number of attempts at approach: 1

## 2022-09-09 NOTE — OPERATIVE REPORT
[facility]    PATIENT'S NAME: Ifeanyi Ford   ATTENDING PHYSICIAN: Yoandy To. Chayito Hill MD   OPERATING PHYSICIAN: Yoandy To. Chayito Hill MD   PATIENT ACCOUNT#:   [id number]    LOCATION:  [address]  MEDICAL RECORD #:   [id number]       YOB: 1955  ADMISSION DATE:       09/09/2022      OPERATION DATE:  09/09/2022    OPERATIVE REPORT      PREOPERATIVE DIAGNOSIS:  Malignant melanoma, left lower leg. POSTOPERATIVE DIAGNOSIS:  Malignant melanoma, left lower leg. PROCEDURE:    1. Wide excision of malignant melanoma, left lower leg (2.5 cm). 2.   Complex closure (5 cm). 3.   Clarence lymph node biopsy, left inguinal.    ASSISTANT:  Flor Menjivar PA-C     ANESTHESIA:  General.    INDICATIONS:  Patient is a 23-year-old man who was diagnosed with at least a T1a melanoma of the left lower leg. He presents today for a wide excision and sentinel lymph node biopsy. The surgical treatment matter had been discussed with him, including risks at length. OPERATIVE TECHNIQUE:  On the day prior to this surgery, patient underwent injection of radiolabeled colloid, followed by lymphoscintigraphy showing uptake within the left inguinal region, in addition to the popliteal region. This was discussed with the patient. The patient was brought to the operating room and was placed in supine position. He was given general anesthesia by the anesthesiology service. Sequential compression boots were placed. The patient received preoperative intravenous antibiotic prophylaxis. He was placed in prone position, was prepped and draped in normal sterile fashion. Then, 1 mL of Isosulfan blue dye was injected intradermally around the biopsy site. About 5 minutes later, a popliteal incision in a V-shaped fascia was made and deepened. Popliteal space was entered, preserving nerve and vascular structures. There were lymphatics in the vicinity that did not course to any lymph node.   There was no uptake by the gamma probe to any lymph node. There was no evidence for any blue lymph nodes. There was no evidence for any enlarged lymph nodes. We spent some effort dissecting the space, in addition to a separate incision laterally overlying and marked by our radiology colleagues where there was suspicion for some uptake. However similarly, we were not able to identify any blue lymph nodes, enlarged lymph nodes, or any nodes with increased uptake by the gamma probe. Rather, there were lymphatic channels coursing cephalad not into any lymph node in the vicinity. Thus, the wounds were closed in layers using 3-0 Vicryl and 4-0 Vicryl sutures. Steri-Strips with Telfa and Tegaderm dressings applied. Next, a 1 cm margin had been marked around the biopsy site of the left lower leg. An elliptical incision oriented longitudinally measuring 2.5 x 5 cm was made and deepened to underlying muscle fascia. The specimen was excised in toto, oriented, and sent to Pathology. Hemostasis was achieved and maintained. To allow for primary closure without tension, flaps were undermined anterior-posterior, and layered closure was attained using 3-0 Vicryl sutures and 4-0 Vicryl suture. Steri-Strips with Telfa and Tegaderm dressings applied. Subsequently, patient was placed in supine position and was re-prepped and draped in normal sterile fashion. A left inguinal incision overlying hot transcutaneous site was made and deepened. We encountered 1 unremarkable-appearing lymph node with increased uptake by the gamma probe, and it was blue in color. It was dissected free of its surrounding tissue. It had an in vivo count of 1621/10 seconds. It was excised. Its ex vivo count was 2078/10 seconds. It was sent to Pathology. Background count was 6. There was no evidence for any other blue nodes or hot nodes or any enlarged lymph nodes. The wound was then closed in layers using 3-0 Vicryl and 4-0 Vicryl sutures.   Of note, all wounds had been infiltrated with 0.25% Marcaine. Sterile dressings applied. The patient tolerated the procedure well without immediate complications. He was extubated in the operating room and was sent in stable condition to recovery room. Counts were correct. I was present throughout the procedure. Dictated By Curtis Eaton MD  d: 09/09/2022 10:19:17  t: 09/09/2022 17:27:13  Job [id number]  ZYP/

## 2022-09-09 NOTE — INTERVAL H&P NOTE
There has been no significant change since the patient was seen as documented in EPIC. Surgery revisted. To proceed as planned.       Aaron Mae PA-C

## 2022-09-09 NOTE — BRIEF OP NOTE
Pre-Operative Diagnosis: Malignant melanoma of left lower leg (HCC) [C43.72]     Post-Operative Diagnosis: Malignant melanoma of left lower leg ([address] [C43.72]      Procedure Performed:    Wide excision left lower leg with sentinel lymph node biopsy    Surgeon(s) and Role:     Vicente Uribe MD - Primary    Assistant(s):  PA: SHARON Wolff     Surgical Findings: as expected, pathology pending     Specimen: yes     Estimated Blood Loss: 5 ml     Anh Guzmán  9/9/2022  9:51 AM

## 2022-09-12 ENCOUNTER — HOSPITAL ENCOUNTER (OUTPATIENT)
Dept: ULTRASOUND IMAGING | Facility: HOSPITAL | Age: 67
Discharge: HOME | End: 2022-09-12
Attending: PHYSICIAN ASSISTANT
Payer: COMMERCIAL

## 2022-09-12 ENCOUNTER — OFFICE VISIT (OUTPATIENT)
Dept: SURGERY | Facility: CLINIC | Age: 67
End: 2022-09-12
Payer: COMMERCIAL

## 2022-09-12 ENCOUNTER — TELEPHONE (OUTPATIENT)
Dept: SURGERY | Facility: CLINIC | Age: 67
End: 2022-09-12

## 2022-09-12 VITALS
RESPIRATION RATE: 16 BRPM | SYSTOLIC BLOOD PRESSURE: 163 MMHG | HEART RATE: 79 BPM | DIASTOLIC BLOOD PRESSURE: 90 MMHG | OXYGEN SATURATION: 98 % | TEMPERATURE: 98 F

## 2022-09-12 DIAGNOSIS — C43.72 MALIGNANT MELANOMA OF LEFT LOWER LEG (HCC): ICD-10-CM

## 2022-09-12 DIAGNOSIS — M79.89 PAIN AND SWELLING OF LEFT LOWER LEG: Primary | ICD-10-CM

## 2022-09-12 DIAGNOSIS — M79.89 PAIN AND SWELLING OF LEFT LOWER LEG: ICD-10-CM

## 2022-09-12 DIAGNOSIS — M79.662 PAIN AND SWELLING OF LEFT LOWER LEG: ICD-10-CM

## 2022-09-12 DIAGNOSIS — M79.662 PAIN AND SWELLING OF LEFT LOWER LEG: Primary | ICD-10-CM

## 2022-09-12 PROCEDURE — 93971 EXTREMITY STUDY: CPT | Performed by: PHYSICIAN ASSISTANT

## 2022-09-12 NOTE — TELEPHONE ENCOUNTER
Patient was called to see how he is doing post operatively. Patient state that he has swelling in his left leg that goes from his thigh to his toes. Patient denies any redness, warm to the touch or calf pain. Patient state that he has developed an URI. Patient denies calling his PCP. Han HERRERA was notified of swelling and she would like to see him in clinic today. Patient given an appointment for today. Patient verbalized understanding and will be here in 45 minutes.

## 2022-09-19 ENCOUNTER — TELEPHONE (OUTPATIENT)
Dept: SURGERY | Facility: CLINIC | Age: 67
End: 2022-09-19

## 2022-09-19 NOTE — TELEPHONE ENCOUNTER
Pt sent message regarding swelling of incisional site near groin. Pt sent photos of groin site through my chart. Yefri Bowen PA-C reviewed photos and informed patient to wrap with ace wrap for swelling and patient is scheduled to be seen in office this Wednesday. Pt stated understanding.

## 2022-09-20 ENCOUNTER — TELEPHONE (OUTPATIENT)
Dept: SURGERY | Facility: CLINIC | Age: 67
End: 2022-09-20

## 2022-09-20 NOTE — TELEPHONE ENCOUNTER
Pt called and stated that his incisional site in groin area has started leaking where he had seroma. Pt stated it was serous drainage. Informed patient we could see him in office today or he could apply gauze and keep appointment tomorrow. Pt stated he is fine keeping appointment for tomorrow.

## 2022-09-21 ENCOUNTER — OFFICE VISIT (OUTPATIENT)
Dept: SURGERY | Facility: CLINIC | Age: 67
End: 2022-09-21

## 2022-09-21 VITALS
SYSTOLIC BLOOD PRESSURE: 165 MMHG | BODY MASS INDEX: 29 KG/M2 | DIASTOLIC BLOOD PRESSURE: 92 MMHG | OXYGEN SATURATION: 96 % | HEART RATE: 68 BPM | RESPIRATION RATE: 16 BRPM | WEIGHT: 202 LBS

## 2022-09-21 DIAGNOSIS — C43.72 MALIGNANT MELANOMA OF LEFT LOWER LEG (HCC): Primary | ICD-10-CM

## 2022-09-21 PROCEDURE — 99024 POSTOP FOLLOW-UP VISIT: CPT | Performed by: PHYSICIAN ASSISTANT

## 2022-09-21 PROCEDURE — 3080F DIAST BP >= 90 MM HG: CPT | Performed by: PHYSICIAN ASSISTANT

## 2022-09-21 PROCEDURE — 3077F SYST BP >= 140 MM HG: CPT | Performed by: PHYSICIAN ASSISTANT

## 2022-09-22 ENCOUNTER — TELEPHONE (OUTPATIENT)
Dept: SURGERY | Facility: CLINIC | Age: 67
End: 2022-09-22

## 2022-09-22 DIAGNOSIS — L03.90 CELLULITIS OF SKIN: Primary | ICD-10-CM

## 2022-09-22 RX ORDER — SULFAMETHOXAZOLE AND TRIMETHOPRIM 800; 160 MG/1; MG/1
1 TABLET ORAL 2 TIMES DAILY
Qty: 14 TABLET | Refills: 0 | Status: SHIPPED | OUTPATIENT
Start: 2022-09-22 | End: 2022-09-29

## 2022-09-22 NOTE — TELEPHONE ENCOUNTER
Pt called and stated that he is having increased drainage that is serosanguinous from inguinal incision. Pt also stated that he has increased pain that is shooting down his leg. Pt stated that is urine is cloudy and suspects that he has a UTI. Hai Rand PA-C ordered patient bactrim for incisional site, but if UTI symptoms persist to follow up with PCP. Pt stated understanding.

## 2022-10-04 ENCOUNTER — TELEPHONE (OUTPATIENT)
Dept: SURGERY | Facility: CLINIC | Age: 67
End: 2022-10-04

## 2022-10-04 NOTE — TELEPHONE ENCOUNTER
Pt called and stated that his surgical site is no longer draining, but has become a hard ball that feels swollen. Stated that it is causing him leg pain and discomfort. Scheduled patient to see Mariah Dangelo tomorrow.

## 2022-10-05 ENCOUNTER — OFFICE VISIT (OUTPATIENT)
Dept: SURGERY | Facility: CLINIC | Age: 67
End: 2022-10-05
Payer: COMMERCIAL

## 2022-10-05 VITALS
TEMPERATURE: 98 F | DIASTOLIC BLOOD PRESSURE: 91 MMHG | OXYGEN SATURATION: 97 % | BODY MASS INDEX: 30 KG/M2 | WEIGHT: 207 LBS | SYSTOLIC BLOOD PRESSURE: 158 MMHG | RESPIRATION RATE: 16 BRPM | HEART RATE: 65 BPM

## 2022-10-05 DIAGNOSIS — L76.34 POSTPROCEDURAL SEROMA OF SKIN AND SUBCUTANEOUS TISSUE FOLLOWING OTHER PROCEDURE: Primary | ICD-10-CM

## 2022-10-05 DIAGNOSIS — I89.0 LYMPHEDEMA: ICD-10-CM

## 2022-10-05 DIAGNOSIS — C43.72 MALIGNANT MELANOMA OF LEFT LOWER LEG (HCC): ICD-10-CM

## 2022-10-05 PROCEDURE — 3080F DIAST BP >= 90 MM HG: CPT | Performed by: PHYSICIAN ASSISTANT

## 2022-10-05 PROCEDURE — 99213 OFFICE O/P EST LOW 20 MIN: CPT | Performed by: PHYSICIAN ASSISTANT

## 2022-10-05 PROCEDURE — 3077F SYST BP >= 140 MM HG: CPT | Performed by: PHYSICIAN ASSISTANT

## 2022-10-06 ENCOUNTER — OFFICE VISIT (OUTPATIENT)
Dept: FAMILY MEDICINE CLINIC | Facility: CLINIC | Age: 67
End: 2022-10-06
Payer: COMMERCIAL

## 2022-10-06 VITALS
BODY MASS INDEX: 31.16 KG/M2 | HEIGHT: 69 IN | WEIGHT: 210.38 LBS | SYSTOLIC BLOOD PRESSURE: 120 MMHG | DIASTOLIC BLOOD PRESSURE: 60 MMHG

## 2022-10-06 DIAGNOSIS — G47.33 OSA ON CPAP: ICD-10-CM

## 2022-10-06 DIAGNOSIS — D69.6 THROMBOCYTOPENIA (HCC): ICD-10-CM

## 2022-10-06 DIAGNOSIS — Z00.00 ANNUAL PHYSICAL EXAM: Primary | ICD-10-CM

## 2022-10-06 DIAGNOSIS — L40.9 PSORIASIS: ICD-10-CM

## 2022-10-06 DIAGNOSIS — K76.0 NAFLD (NONALCOHOLIC FATTY LIVER DISEASE): ICD-10-CM

## 2022-10-06 DIAGNOSIS — C50.121 MALIGNANT NEOPLASM OF CENTRAL PORTION OF RIGHT BREAST IN MALE, ESTROGEN RECEPTOR POSITIVE (HCC): ICD-10-CM

## 2022-10-06 DIAGNOSIS — Z99.89 OSA ON CPAP: ICD-10-CM

## 2022-10-06 DIAGNOSIS — Z17.0 MALIGNANT NEOPLASM OF CENTRAL PORTION OF RIGHT BREAST IN MALE, ESTROGEN RECEPTOR POSITIVE (HCC): ICD-10-CM

## 2022-10-06 DIAGNOSIS — E78.2 MIXED HYPERLIPIDEMIA: ICD-10-CM

## 2022-10-06 DIAGNOSIS — E55.9 VITAMIN D DEFICIENCY: ICD-10-CM

## 2022-10-06 DIAGNOSIS — F51.01 PRIMARY INSOMNIA: ICD-10-CM

## 2022-10-06 PROBLEM — J44.9 CHRONIC OBSTRUCTIVE PULMONARY DISEASE, UNSPECIFIED (HCC): Status: ACTIVE | Noted: 2022-10-06

## 2022-10-06 PROCEDURE — 3008F BODY MASS INDEX DOCD: CPT | Performed by: FAMILY MEDICINE

## 2022-10-06 PROCEDURE — 99397 PER PM REEVAL EST PAT 65+ YR: CPT | Performed by: FAMILY MEDICINE

## 2022-10-06 PROCEDURE — 3074F SYST BP LT 130 MM HG: CPT | Performed by: FAMILY MEDICINE

## 2022-10-06 PROCEDURE — 3078F DIAST BP <80 MM HG: CPT | Performed by: FAMILY MEDICINE

## 2022-10-06 RX ORDER — ZOLPIDEM TARTRATE 12.5 MG/1
TABLET, FILM COATED, EXTENDED RELEASE ORAL
Qty: 90 TABLET | Refills: 1 | Status: SHIPPED | OUTPATIENT
Start: 2022-10-06

## 2022-10-06 RX ORDER — FLUTICASONE PROPIONATE AND SALMETEROL 113; 14 UG/1; UG/1
1 POWDER, METERED RESPIRATORY (INHALATION) 2 TIMES DAILY
Qty: 3 EACH | Refills: 3 | Status: SHIPPED | OUTPATIENT
Start: 2022-10-06

## 2022-10-06 RX ORDER — ATORVASTATIN CALCIUM 80 MG/1
80 TABLET, FILM COATED ORAL DAILY
Qty: 90 TABLET | Refills: 1 | Status: SHIPPED | OUTPATIENT
Start: 2022-10-06

## 2022-10-10 ENCOUNTER — ORDER TRANSCRIPTION (OUTPATIENT)
Dept: PHYSICAL THERAPY | Facility: HOSPITAL | Age: 67
End: 2022-10-10

## 2022-10-10 DIAGNOSIS — I89.0 LYMPHEDEMA: Primary | ICD-10-CM

## 2022-10-10 DIAGNOSIS — C43.72 MALIGNANT MELANOMA OF LEFT LOWER LEG (HCC): ICD-10-CM

## 2022-10-12 ENCOUNTER — TELEPHONE (OUTPATIENT)
Dept: PHYSICAL THERAPY | Facility: HOSPITAL | Age: 67
End: 2022-10-12

## 2022-10-12 ENCOUNTER — OFFICE VISIT (OUTPATIENT)
Dept: SURGERY | Facility: CLINIC | Age: 67
End: 2022-10-12
Payer: COMMERCIAL

## 2022-10-12 VITALS
RESPIRATION RATE: 16 BRPM | SYSTOLIC BLOOD PRESSURE: 133 MMHG | HEART RATE: 69 BPM | BODY MASS INDEX: 31 KG/M2 | WEIGHT: 207 LBS | DIASTOLIC BLOOD PRESSURE: 83 MMHG | OXYGEN SATURATION: 95 % | TEMPERATURE: 98 F

## 2022-10-12 DIAGNOSIS — C43.72 MALIGNANT MELANOMA OF LEFT LOWER LEG (HCC): Primary | ICD-10-CM

## 2022-10-12 PROCEDURE — 99243 OFF/OP CNSLTJ NEW/EST LOW 30: CPT | Performed by: SURGERY

## 2022-10-12 PROCEDURE — 3075F SYST BP GE 130 - 139MM HG: CPT | Performed by: SURGERY

## 2022-10-12 PROCEDURE — 3079F DIAST BP 80-89 MM HG: CPT | Performed by: SURGERY

## 2022-10-12 PROCEDURE — 99213 OFFICE O/P EST LOW 20 MIN: CPT | Performed by: SURGERY

## 2022-10-12 NOTE — PATIENT INSTRUCTIONS
Surgeon:              Dr. Moris Verduzco. Ambrose Austin, PhD                                          Tel:         [phone]                                  Fax:        [phone]    Surgery/Procedure:    Left lower leg reconstruction with local tissue rearrangement, possible skin graft, possible integra, possible dermal fat graft  1.25 hours for Dr. Padmini Vargas portion, general anesthesia, outpatient, joint with Dr. CHAVIRA [facility]:  [facility]: [facility], [place], [address]           [phone]  [facility]: [address], [address], [place]               [phone]    1. Someone will need to drive you to and from the hospital if your procedure is outpatient. 2.Do not drink alcohol or smoke 24 hours prior to your procedure. 3. Bring a picture ID and your insurance card. 4. You will be contacted by the hospital the day before to confirm the procedure time and location. 5. The hospital will also contact you approximately one week before surgery to schedule your COVID test.     6. Do not take any herbal supplements or blood thinners at least one week before your procedure/surgery. This includes NSAID's (aspirin, baby aspirin, Motrin, Ibuprofen, Aleve, Advil, Naproxen, etc), Plavix, fish oil, vitamin E, turmeric, CoQ10, or green tea supplements, etc. *TYLENOL or acetaminophen is ok to take*    7. PRE-OPERATIVE TESTING: History and physical with medical clearance is REQUIRED within 30 days of the surgery date and is mandatory per Dr. Ambrose Austin. *If this is not done, your surgery will be postponed*  Per Dr. Shantel Mendoza    8. Please inform us if you develop any Covid-19 like symptoms, test positive or have been exposed for Covid- 19 prior to surgery.      Consent obtained for skin graft surgery, repair of skin defects with skin flaps  Photos taken on 10/12/2022

## 2022-10-13 ENCOUNTER — OFFICE VISIT (OUTPATIENT)
Dept: OCCUPATIONAL MEDICINE | Facility: HOSPITAL | Age: 67
End: 2022-10-13
Attending: PHYSICIAN ASSISTANT
Payer: COMMERCIAL

## 2022-10-13 ENCOUNTER — TELEPHONE (OUTPATIENT)
Dept: SURGERY | Facility: CLINIC | Age: 67
End: 2022-10-13

## 2022-10-13 DIAGNOSIS — C43.72 MALIGNANT MELANOMA OF LEFT LOWER LEG (HCC): Primary | ICD-10-CM

## 2022-10-13 DIAGNOSIS — C43.72 MALIGNANT MELANOMA OF LEFT LOWER LEG (HCC): ICD-10-CM

## 2022-10-13 DIAGNOSIS — I89.0 LYMPHEDEMA: ICD-10-CM

## 2022-10-13 PROCEDURE — 97166 OT EVAL MOD COMPLEX 45 MIN: CPT

## 2022-10-13 PROCEDURE — 97535 SELF CARE MNGMENT TRAINING: CPT

## 2022-10-13 NOTE — TELEPHONE ENCOUNTER
Calling patient to discuss surgery scheduling. Offered patient 10/25/2022 at the [facility] with Dr. Iván Major and Dr. Joanie Luque. Patient agreed.

## 2022-10-20 ENCOUNTER — OFFICE VISIT (OUTPATIENT)
Dept: OCCUPATIONAL MEDICINE | Facility: HOSPITAL | Age: 67
End: 2022-10-20
Attending: PHYSICIAN ASSISTANT
Payer: COMMERCIAL

## 2022-10-20 DIAGNOSIS — I89.0 LYMPHEDEMA: ICD-10-CM

## 2022-10-20 DIAGNOSIS — C43.72 MALIGNANT MELANOMA OF LEFT LOWER LEG (HCC): ICD-10-CM

## 2022-10-20 PROCEDURE — 97140 MANUAL THERAPY 1/> REGIONS: CPT

## 2022-10-22 ENCOUNTER — LAB ENCOUNTER (OUTPATIENT)
Dept: LAB | Age: 67
End: 2022-10-22
Attending: SURGERY
Payer: COMMERCIAL

## 2022-10-22 DIAGNOSIS — Z01.812 ENCOUNTER FOR PREOPERATIVE SCREENING LABORATORY TESTING FOR COVID-19 VIRUS: ICD-10-CM

## 2022-10-22 DIAGNOSIS — Z20.822 ENCOUNTER FOR PREOPERATIVE SCREENING LABORATORY TESTING FOR COVID-19 VIRUS: ICD-10-CM

## 2022-10-22 DIAGNOSIS — Z01.818 PRE-OP TESTING: ICD-10-CM

## 2022-10-22 LAB
ALBUMIN SERPL-MCNC: 3.7 G/DL (ref 3.4–5)
ALBUMIN/GLOB SERPL: 1.2 {RATIO} (ref 1–2)
ALP LIVER SERPL-CCNC: 83 U/L
ALT SERPL-CCNC: 42 U/L
ANION GAP SERPL CALC-SCNC: 4 MMOL/L (ref 0–18)
AST SERPL-CCNC: 26 U/L (ref 15–37)
BILIRUB SERPL-MCNC: 2.3 MG/DL (ref 0.1–2)
BUN BLD-MCNC: 12 MG/DL (ref 7–18)
CALCIUM BLD-MCNC: 8.8 MG/DL (ref 8.5–10.1)
CHLORIDE SERPL-SCNC: 107 MMOL/L (ref 98–112)
CO2 SERPL-SCNC: 28 MMOL/L (ref 21–32)
CREAT BLD-MCNC: 0.93 MG/DL
ERYTHROCYTE [DISTWIDTH] IN BLOOD BY AUTOMATED COUNT: 12.7 %
GFR SERPLBLD BASED ON 1.73 SQ M-ARVRAT: 91 ML/MIN/1.73M2 (ref 60–?)
GLOBULIN PLAS-MCNC: 3.1 G/DL (ref 2.8–4.4)
GLUCOSE BLD-MCNC: 110 MG/DL (ref 70–99)
HCT VFR BLD AUTO: 43.6 %
HGB BLD-MCNC: 15 G/DL
MCH RBC QN AUTO: 32.5 PG (ref 26–34)
MCHC RBC AUTO-ENTMCNC: 34.4 G/DL (ref 31–37)
MCV RBC AUTO: 94.6 FL
OSMOLALITY SERPL CALC.SUM OF ELEC: 288 MOSM/KG (ref 275–295)
PLATELET # BLD AUTO: 135 10(3)UL (ref 150–450)
POTASSIUM SERPL-SCNC: 4.2 MMOL/L (ref 3.5–5.1)
PROT SERPL-MCNC: 6.8 G/DL (ref 6.4–8.2)
RBC # BLD AUTO: 4.61 X10(6)UL
SODIUM SERPL-SCNC: 139 MMOL/L (ref 136–145)
WBC # BLD AUTO: 5.3 X10(3) UL (ref 4–11)

## 2022-10-22 PROCEDURE — 36415 COLL VENOUS BLD VENIPUNCTURE: CPT

## 2022-10-22 PROCEDURE — 80053 COMPREHEN METABOLIC PANEL: CPT

## 2022-10-22 PROCEDURE — 85027 COMPLETE CBC AUTOMATED: CPT

## 2022-10-23 LAB — SARS-COV-2 RNA RESP QL NAA+PROBE: NOT DETECTED

## 2022-10-25 ENCOUNTER — ANESTHESIA EVENT (OUTPATIENT)
Dept: SURGERY | Facility: HOSPITAL | Age: 67
End: 2022-10-25
Payer: COMMERCIAL

## 2022-10-25 ENCOUNTER — ANESTHESIA (OUTPATIENT)
Dept: SURGERY | Facility: HOSPITAL | Age: 67
End: 2022-10-25
Payer: COMMERCIAL

## 2022-10-25 ENCOUNTER — APPOINTMENT (OUTPATIENT)
Dept: OCCUPATIONAL MEDICINE | Facility: HOSPITAL | Age: 67
End: 2022-10-25
Attending: PHYSICIAN ASSISTANT
Payer: COMMERCIAL

## 2022-10-25 ENCOUNTER — HOSPITAL ENCOUNTER (OUTPATIENT)
Facility: HOSPITAL | Age: 67
Setting detail: HOSPITAL OUTPATIENT SURGERY
Discharge: HOME | End: 2022-10-25
Attending: SURGERY | Admitting: SURGERY
Payer: COMMERCIAL

## 2022-10-25 VITALS
OXYGEN SATURATION: 91 % | HEIGHT: 69 IN | SYSTOLIC BLOOD PRESSURE: 131 MMHG | WEIGHT: 205.81 LBS | HEART RATE: 62 BPM | RESPIRATION RATE: 16 BRPM | TEMPERATURE: 98 F | DIASTOLIC BLOOD PRESSURE: 77 MMHG | BODY MASS INDEX: 30.48 KG/M2

## 2022-10-25 DIAGNOSIS — Z20.822 ENCOUNTER FOR PREOPERATIVE SCREENING LABORATORY TESTING FOR COVID-19 VIRUS: Primary | ICD-10-CM

## 2022-10-25 DIAGNOSIS — Z01.818 PRE-OP TESTING: ICD-10-CM

## 2022-10-25 DIAGNOSIS — C43.72 MALIGNANT MELANOMA OF LEFT LOWER LEG (HCC): ICD-10-CM

## 2022-10-25 DIAGNOSIS — Z01.812 ENCOUNTER FOR PREOPERATIVE SCREENING LABORATORY TESTING FOR COVID-19 VIRUS: Primary | ICD-10-CM

## 2022-10-25 PROCEDURE — S0030 INJECTION, METRONIDAZOLE: HCPCS | Performed by: SURGERY

## 2022-10-25 PROCEDURE — 88307 TISSUE EXAM BY PATHOLOGIST: CPT | Performed by: SURGERY

## 2022-10-25 PROCEDURE — 0JUP37Z SUPPLEMENT OF LEFT LOWER LEG SUBCUTANEOUS TISSUE AND FASCIA WITH AUTOLOGOUS TISSUE SUBSTITUTE, PERCUTANEOUS APPROACH: ICD-10-PCS | Performed by: SURGERY

## 2022-10-25 PROCEDURE — 88305 TISSUE EXAM BY PATHOLOGIST: CPT | Performed by: SURGERY

## 2022-10-25 PROCEDURE — 0HBLXZZ EXCISION OF LEFT LOWER LEG SKIN, EXTERNAL APPROACH: ICD-10-PCS | Performed by: SURGERY

## 2022-10-25 RX ORDER — SODIUM CHLORIDE, SODIUM LACTATE, POTASSIUM CHLORIDE, CALCIUM CHLORIDE 600; 310; 30; 20 MG/100ML; MG/100ML; MG/100ML; MG/100ML
INJECTION, SOLUTION INTRAVENOUS CONTINUOUS
Status: DISCONTINUED | OUTPATIENT
Start: 2022-10-25 | End: 2022-10-25

## 2022-10-25 RX ORDER — DEXAMETHASONE SODIUM PHOSPHATE 4 MG/ML
VIAL (ML) INJECTION AS NEEDED
Status: DISCONTINUED | OUTPATIENT
Start: 2022-10-25 | End: 2022-10-25 | Stop reason: SURG

## 2022-10-25 RX ORDER — KETOROLAC TROMETHAMINE 30 MG/ML
30 INJECTION, SOLUTION INTRAMUSCULAR; INTRAVENOUS ONCE AS NEEDED
Status: COMPLETED | OUTPATIENT
Start: 2022-10-25 | End: 2022-10-25

## 2022-10-25 RX ORDER — HYDROCODONE BITARTRATE AND ACETAMINOPHEN 5; 325 MG/1; MG/1
2 TABLET ORAL ONCE AS NEEDED
Status: DISCONTINUED | OUTPATIENT
Start: 2022-10-25 | End: 2022-10-25

## 2022-10-25 RX ORDER — METOCLOPRAMIDE HYDROCHLORIDE 5 MG/ML
10 INJECTION INTRAMUSCULAR; INTRAVENOUS EVERY 8 HOURS PRN
Status: DISCONTINUED | OUTPATIENT
Start: 2022-10-25 | End: 2022-10-25

## 2022-10-25 RX ORDER — LABETALOL HYDROCHLORIDE 5 MG/ML
5 INJECTION, SOLUTION INTRAVENOUS EVERY 5 MIN PRN
Status: DISCONTINUED | OUTPATIENT
Start: 2022-10-25 | End: 2022-10-25

## 2022-10-25 RX ORDER — HYDROCODONE BITARTRATE AND ACETAMINOPHEN 5; 325 MG/1; MG/1
1 TABLET ORAL EVERY 6 HOURS PRN
Qty: 10 TABLET | Refills: 0 | Status: SHIPPED | OUTPATIENT
Start: 2022-10-25 | End: 2022-10-25

## 2022-10-25 RX ORDER — MIDAZOLAM HYDROCHLORIDE 1 MG/ML
1 INJECTION INTRAMUSCULAR; INTRAVENOUS EVERY 5 MIN PRN
Status: DISCONTINUED | OUTPATIENT
Start: 2022-10-25 | End: 2022-10-25

## 2022-10-25 RX ORDER — ONDANSETRON 2 MG/ML
INJECTION INTRAMUSCULAR; INTRAVENOUS AS NEEDED
Status: DISCONTINUED | OUTPATIENT
Start: 2022-10-25 | End: 2022-10-25 | Stop reason: SURG

## 2022-10-25 RX ORDER — METRONIDAZOLE 500 MG/100ML
500 INJECTION, SOLUTION INTRAVENOUS ONCE
Status: DISCONTINUED | OUTPATIENT
Start: 2022-10-25 | End: 2022-10-25 | Stop reason: HOSPADM

## 2022-10-25 RX ORDER — CEPHALEXIN 500 MG/1
500 CAPSULE ORAL 4 TIMES DAILY
Qty: 20 CAPSULE | Refills: 0 | Status: SHIPPED | OUTPATIENT
Start: 2022-10-25 | End: 2022-10-30

## 2022-10-25 RX ORDER — ONDANSETRON 4 MG/1
4 TABLET, FILM COATED ORAL EVERY 8 HOURS PRN
Qty: 20 TABLET | Refills: 0 | Status: SHIPPED | OUTPATIENT
Start: 2022-10-25

## 2022-10-25 RX ORDER — SODIUM CHLORIDE 9 MG/ML
INJECTION, SOLUTION INTRAVENOUS CONTINUOUS
Status: DISCONTINUED | OUTPATIENT
Start: 2022-10-25 | End: 2022-10-25

## 2022-10-25 RX ORDER — CEFAZOLIN SODIUM 1 G/3ML
INJECTION, POWDER, FOR SOLUTION INTRAMUSCULAR; INTRAVENOUS AS NEEDED
Status: DISCONTINUED | OUTPATIENT
Start: 2022-10-25 | End: 2022-10-25 | Stop reason: SURG

## 2022-10-25 RX ORDER — NALOXONE HYDROCHLORIDE 0.4 MG/ML
80 INJECTION, SOLUTION INTRAMUSCULAR; INTRAVENOUS; SUBCUTANEOUS AS NEEDED
Status: DISCONTINUED | OUTPATIENT
Start: 2022-10-25 | End: 2022-10-25

## 2022-10-25 RX ORDER — MEPERIDINE HYDROCHLORIDE 25 MG/ML
12.5 INJECTION INTRAMUSCULAR; INTRAVENOUS; SUBCUTANEOUS AS NEEDED
Status: DISCONTINUED | OUTPATIENT
Start: 2022-10-25 | End: 2022-10-25

## 2022-10-25 RX ORDER — KETOROLAC TROMETHAMINE 30 MG/ML
INJECTION, SOLUTION INTRAMUSCULAR; INTRAVENOUS
Status: COMPLETED
Start: 2022-10-25 | End: 2022-10-25

## 2022-10-25 RX ORDER — HYDROMORPHONE HYDROCHLORIDE 1 MG/ML
0.6 INJECTION, SOLUTION INTRAMUSCULAR; INTRAVENOUS; SUBCUTANEOUS EVERY 5 MIN PRN
Status: DISCONTINUED | OUTPATIENT
Start: 2022-10-25 | End: 2022-10-25

## 2022-10-25 RX ORDER — HYDROMORPHONE HYDROCHLORIDE 1 MG/ML
INJECTION, SOLUTION INTRAMUSCULAR; INTRAVENOUS; SUBCUTANEOUS
Status: COMPLETED
Start: 2022-10-25 | End: 2022-10-25

## 2022-10-25 RX ORDER — HYDROMORPHONE HYDROCHLORIDE 1 MG/ML
INJECTION, SOLUTION INTRAMUSCULAR; INTRAVENOUS; SUBCUTANEOUS
Status: DISCONTINUED | OUTPATIENT
Start: 2022-10-25 | End: 2022-10-25

## 2022-10-25 RX ORDER — BUPIVACAINE HYDROCHLORIDE 2.5 MG/ML
INJECTION, SOLUTION EPIDURAL; INFILTRATION; INTRACAUDAL AS NEEDED
Status: DISCONTINUED | OUTPATIENT
Start: 2022-10-25 | End: 2022-10-25 | Stop reason: HOSPADM

## 2022-10-25 RX ORDER — LIDOCAINE HYDROCHLORIDE 10 MG/ML
INJECTION, SOLUTION EPIDURAL; INFILTRATION; INTRACAUDAL; PERINEURAL AS NEEDED
Status: DISCONTINUED | OUTPATIENT
Start: 2022-10-25 | End: 2022-10-25 | Stop reason: SURG

## 2022-10-25 RX ORDER — HYDROCODONE BITARTRATE AND ACETAMINOPHEN 5; 325 MG/1; MG/1
TABLET ORAL
Qty: 15 TABLET | Refills: 0 | Status: SHIPPED | OUTPATIENT
Start: 2022-10-25

## 2022-10-25 RX ORDER — HYDROCODONE BITARTRATE AND ACETAMINOPHEN 5; 325 MG/1; MG/1
1 TABLET ORAL ONCE AS NEEDED
Status: DISCONTINUED | OUTPATIENT
Start: 2022-10-25 | End: 2022-10-25

## 2022-10-25 RX ORDER — HEPARIN SODIUM 5000 [USP'U]/ML
5000 INJECTION, SOLUTION INTRAVENOUS; SUBCUTANEOUS ONCE
Status: COMPLETED | OUTPATIENT
Start: 2022-10-25 | End: 2022-10-25

## 2022-10-25 RX ORDER — ACETAMINOPHEN 500 MG
1000 TABLET ORAL ONCE AS NEEDED
Status: DISCONTINUED | OUTPATIENT
Start: 2022-10-25 | End: 2022-10-25

## 2022-10-25 RX ORDER — HYDROMORPHONE HYDROCHLORIDE 1 MG/ML
0.2 INJECTION, SOLUTION INTRAMUSCULAR; INTRAVENOUS; SUBCUTANEOUS EVERY 5 MIN PRN
Status: DISCONTINUED | OUTPATIENT
Start: 2022-10-25 | End: 2022-10-25

## 2022-10-25 RX ORDER — ONDANSETRON 2 MG/ML
4 INJECTION INTRAMUSCULAR; INTRAVENOUS EVERY 6 HOURS PRN
Status: DISCONTINUED | OUTPATIENT
Start: 2022-10-25 | End: 2022-10-25

## 2022-10-25 RX ORDER — HYDROMORPHONE HYDROCHLORIDE 1 MG/ML
0.4 INJECTION, SOLUTION INTRAMUSCULAR; INTRAVENOUS; SUBCUTANEOUS EVERY 5 MIN PRN
Status: DISCONTINUED | OUTPATIENT
Start: 2022-10-25 | End: 2022-10-25

## 2022-10-25 RX ADMIN — SODIUM CHLORIDE: 9 INJECTION, SOLUTION INTRAVENOUS at 14:48:00

## 2022-10-25 RX ADMIN — SODIUM CHLORIDE: 9 INJECTION, SOLUTION INTRAVENOUS at 13:14:00

## 2022-10-25 RX ADMIN — DEXAMETHASONE SODIUM PHOSPHATE 8 MG: 4 MG/ML VIAL (ML) INJECTION at 13:21:00

## 2022-10-25 RX ADMIN — CEFAZOLIN SODIUM 2 G: 1 INJECTION, POWDER, FOR SOLUTION INTRAMUSCULAR; INTRAVENOUS at 13:21:00

## 2022-10-25 RX ADMIN — ONDANSETRON 4 MG: 2 INJECTION INTRAMUSCULAR; INTRAVENOUS at 14:33:00

## 2022-10-25 RX ADMIN — LIDOCAINE HYDROCHLORIDE 50 MG: 10 INJECTION, SOLUTION EPIDURAL; INFILTRATION; INTRACAUDAL; PERINEURAL at 13:17:00

## 2022-10-25 NOTE — BRIEF OP NOTE
Pre-Operative Diagnosis: Malignant melanoma of left lower leg (HCC) [C43.72]     Post-Operative Diagnosis: Malignant melanoma of left lower leg ([address] [C43.72]      Procedure Performed:   Re-excision melanoma left lower leg     Surgeon(s) and Role:     Olivier Campos MD     Assistant(s):  PA: Veronica Myers     Surgical Findings: as expected     Specimen: yes     Estimated Blood Loss: 2 ml my portion    Anh Castro  10/25/2022  2:34 PM

## 2022-10-25 NOTE — ANESTHESIA PROCEDURE NOTES
Airway  Date/Time: 10/25/2022 1:19 PM  Urgency: elective    Airway not difficult    General Information and Staff    Patient location during procedure: OR  Anesthesiologist: Leonora Spain MD  Resident/CRNA: Lian Leal CRNA  Performed: CRNA     Indications and Patient Condition  Indications for airway management: anesthesia  Spontaneous Ventilation: absent  Sedation level: deep  Preoxygenated: yes  Patient position: sniffing  Mask difficulty assessment: 1 - vent by mask    Final Airway Details  Final airway type: supraglottic airway      Successful airway: classic  Size 3      Number of attempts at approach: 1  Number of other approaches attempted: 0    Additional Comments  Dentition per pre op

## 2022-10-25 NOTE — ANESTHESIA POSTPROCEDURE EVALUATION
[address] Patient Status:  Hospital Outpatient Surgery   Age/Gender 78year old male MRN [id number]   [facility] SURGERY Attending Anais Calvert MD   Hosp Day # 0 PCP Perry Moralez MD       Anesthesia Post-op Note    Re excision melanoma left lower leg (Dr. Lo Biswas), Left lower leg reconstruction with local tissue rearrangement (Dr. Faheem Flood)    Procedure Summary     Date: 10/25/22 Room / Location: [facility] OR 10 / 1404 [facility] OR    Anesthesia Start: 0043 Anesthesia Stop: 1357    Procedures:       Re excision melanoma left lower leg (Dr. Lo Biswas), Left lower leg reconstruction with local tissue rearrangement (Dr. Faheem Flood) (Left )      . (Left ) Diagnosis:       Malignant melanoma of left lower leg (HCC)      ([facility] left lower leg [facility] [C43.72])    Surgeons: Anais Calvert MD; Ken Ruiz MD Anesthesiologist: Johnson Colmenares MD    Anesthesia Type: general ASA Status: 3          Anesthesia Type: general    Vitals Value Taken Time   /81 10/25/22 1449   Temp 97.2 10/25/22 1449   Pulse 67 10/25/22 1449   Resp 12 10/25/22 1449   SpO2 99 10/25/22 1449       Patient Location: PACU    Anesthesia Type: general    Airway Patency: patent and extubated    Postop Pain Control: adequate    Mental Status: preanesthetic baseline    Nausea/Vomiting: none    Cardiopulmonary/Hydration status: stable euvolemic    Complications: no apparent anesthesia related complications    Postop vital signs: stable    Dental Exam: Unchanged from Preop    Patient to be discharged from PACU when criteria met.

## 2022-10-25 NOTE — BRIEF OP NOTE
Pre-Operative Diagnosis: Malignant melanoma of left lower leg (HCC) [C43.72]     Post-Operative Diagnosis: Malignant melanoma of left lower leg ([address] [C43.72]      Procedure Performed:    Left lower leg reconstruction with complex wound closure and dermal fat graft    Surgeon(s) and Role:     Jean Garcia MD - Primary    Assistant(s):  Maribell Macario PA-C     Surgical Findings: see dictation     Specimen: see pathology     Estimated Blood Loss: Minimal    SHARON Galvez  10/25/2022  2:48 PM

## 2022-10-26 ENCOUNTER — TELEPHONE (OUTPATIENT)
Dept: SURGERY | Facility: CLINIC | Age: 67
End: 2022-10-26

## 2022-10-26 NOTE — OPERATIVE REPORT
[facility]    PATIENT'S NAME: Vasiliy Major   ATTENDING PHYSICIAN: Conrado Angel. Pema Arriola MD   OPERATING PHYSICIAN: Wendy Dukes MD   PATIENT ACCOUNT#:   [id number]    LOCATION:  [address]  MEDICAL RECORD #:   [id number]       YOB: 1955  ADMISSION DATE:       10/25/2022      OPERATION DATE:  10/25/2022    OPERATIVE REPORT      PREOPERATIVE DIAGNOSIS:  Malignant melanoma, left lower leg; open wound, left lower leg. POSTOPERATIVE DIAGNOSIS:  Malignant melanoma, left lower leg; open wound, left lower leg. PROCEDURE:  Left lower leg reconstruction with complex layered closure, 14 cm; dermal fat graft. ASSISTANT:  Марина Alvares PA-C     ANESTHESIA:  General endotracheal anesthesia. COMPLICATIONS:  None. BLOOD LOSS:  Minimal.    INDICATIONS:  This is a 81-year-old gentleman with a malignant melanoma on his left lower leg which was previously excised by Dr. Pema Arriola and required additional margin excision. I was consulted to discuss reconstructive options after the re-excision. The patient at that time had lymphedema of his left lower leg, and we discussed the importance of preoperative edema reduction and trying to achieve wound closure with local tissue or complex closure rather than skin graft. Potential risks, complications, benefits and alternatives were discussed. Risks and complications include, but are not limited to, infection, bleeding, scarring, damage to surrounding structures, hematoma, seroma, flap failure, graft failure, wound dehiscence, need for further surgery. The patient understands and wishes to proceed. Questions were answered. OPERATIVE TECHNIQUE:  Patient was identified in the preoperative area by Dr. Pema Arriola and brought to the operating room. I was called into the room after Dr. Pema Arriola had completed the re-excision. At that time, there was a defect of about 10 x 4 cm in size. There was some tissue laxity in the surrounding area. Undermining was performed in both directions,  sparing to preserve peroneal perforators to the surrounding skin flap. Then, the skin flaps were inset with a combination of 2-0 and 3-0 interrupted Vicryl sutures, and the extra skin on the superior and inferior aspect was excised. The skin was sent to Pathology separately, and the dermal fat graft was used to improve the indentation in the central area. Then, bacitracin ointment and Telfa was applied, followed by Kerlix and Ace bandage. Local 0.5% lidocaine plain was given; a total of 10 mL was used. The patient tolerated the procedure well and awoke from anesthesia without difficulty. All sponge, instrument, and needle counts were correct at the end of the case. Dictated By Lucian Green.  Josselin Calderón MD  d: 10/25/2022 17:39:00  t: 10/25/2022 20:50:46  [facility] [id number]  ZBA/

## 2022-10-26 NOTE — OPERATIVE REPORT
[address]    PATIENT'S NAME: Christiano Rand   ATTENDING PHYSICIAN: Gillian Meredith. Petar Eaton MD   OPERATING PHYSICIAN: Gillian Meredith. Petar Eaton MD   PATIENT ACCOUNT#:   [id number]    LOCATION:  [address]  MEDICAL RECORD #:   [id number]       YOB: 1955  ADMISSION DATE:       10/25/2022      OPERATION DATE:  10/25/2022    OPERATIVE REPORT      PREOPERATIVE DIAGNOSIS:    1.   Malignant melanoma, left lower leg. 2.   Status post wide excision with margin involved with melanoma in situ. POSTOPERATIVE DIAGNOSIS:    1.   Malignant melanoma, left lower leg. 2.   Status post wide excision with margin involved with melanoma in situ. 3.   Pending Pathology report. PROCEDURE:    1. Wide excision melanoma, left lower leg (1.3 cm). 2.   Closure with Dr. Vanessa Maria, Plastic Surgery. ASSISTANT:  SHARON José    ANESTHESIA:  General.    INDICATIONS:  The patient is a 59-year-old man who was diagnosed with a T1a clinically N0 melanoma of the left lower extremity. He recently underwent wide excision, which showed melanoma in situ at the margin. Thus, he presents for wider re-excision. The surgical treatment matter including the indications and risks had been discussed with him. OPERATIVE TECHNIQUE:  The patient was brought to the operating room and was placed in supine position. He was given general anesthesia by the anesthesiology service. Sequential compression boots were placed. The patient received preoperative intravenous antibiotic prophylaxis. He was prepped and draped in the normal sterile fashion. A 0.5 cm margin was marked around the previous excision site and an elliptical incision measuring 1.3 x 6 cm was made and deepened to underlying muscle fascia. The specimen was excised, oriented and sent to Pathology for permanent section evaluation. Hemostasis was achieved and maintained.     At this point, Dr. Vanessa Maria presented for reconstruction, and her portion of the dictation should be noted elsewhere. Dictated By Al Lopez MD  d: 10/25/2022 14:41:10  t: 10/25/2022 19:55:41  [facility] [id number]  [facility]/

## 2022-10-27 ENCOUNTER — OFFICE VISIT (OUTPATIENT)
Dept: OCCUPATIONAL MEDICINE | Facility: HOSPITAL | Age: 67
End: 2022-10-27
Attending: PHYSICIAN ASSISTANT
Payer: COMMERCIAL

## 2022-10-27 DIAGNOSIS — C43.72 MALIGNANT MELANOMA OF LEFT LOWER LEG (HCC): ICD-10-CM

## 2022-10-27 DIAGNOSIS — I89.0 LYMPHEDEMA: ICD-10-CM

## 2022-10-27 PROCEDURE — 97140 MANUAL THERAPY 1/> REGIONS: CPT

## 2022-10-31 ENCOUNTER — HOSPITAL ENCOUNTER (OUTPATIENT)
Dept: MAMMOGRAPHY | Age: 67
Discharge: HOME | End: 2022-10-31
Attending: SURGERY
Payer: COMMERCIAL

## 2022-10-31 DIAGNOSIS — Z85.3 HISTORY OF RIGHT BREAST CANCER: ICD-10-CM

## 2022-10-31 PROCEDURE — 77065 DX MAMMO INCL CAD UNI: CPT | Performed by: SURGERY

## 2022-10-31 PROCEDURE — 77061 BREAST TOMOSYNTHESIS UNI: CPT | Performed by: SURGERY

## 2022-11-02 ENCOUNTER — OFFICE VISIT (OUTPATIENT)
Dept: OCCUPATIONAL MEDICINE | Facility: HOSPITAL | Age: 67
End: 2022-11-02
Attending: PHYSICIAN ASSISTANT
Payer: COMMERCIAL

## 2022-11-02 DIAGNOSIS — I89.0 LYMPHEDEMA: ICD-10-CM

## 2022-11-02 DIAGNOSIS — C43.72 MALIGNANT MELANOMA OF LEFT LOWER LEG (HCC): ICD-10-CM

## 2022-11-02 PROCEDURE — 97140 MANUAL THERAPY 1/> REGIONS: CPT

## 2022-11-04 ENCOUNTER — OFFICE VISIT (OUTPATIENT)
Dept: OCCUPATIONAL MEDICINE | Facility: HOSPITAL | Age: 67
End: 2022-11-04
Attending: PHYSICIAN ASSISTANT
Payer: COMMERCIAL

## 2022-11-04 DIAGNOSIS — I89.0 LYMPHEDEMA: ICD-10-CM

## 2022-11-04 DIAGNOSIS — C43.72 MALIGNANT MELANOMA OF LEFT LOWER LEG (HCC): ICD-10-CM

## 2022-11-04 PROCEDURE — 97140 MANUAL THERAPY 1/> REGIONS: CPT

## 2022-11-09 ENCOUNTER — OFFICE VISIT (OUTPATIENT)
Dept: SURGERY | Facility: CLINIC | Age: 67
End: 2022-11-09
Payer: COMMERCIAL

## 2022-11-09 DIAGNOSIS — C43.72 MALIGNANT MELANOMA OF LEFT LOWER LEG (HCC): Primary | ICD-10-CM

## 2022-11-09 PROCEDURE — 99212 OFFICE O/P EST SF 10 MIN: CPT | Performed by: SURGERY

## 2022-11-09 NOTE — PROGRESS NOTES
Ness Wheatley is a 78year old male who presents today for a follow-up after wide re-excision melanoma left lower leg (Dr. Scott Min) and left lower leg reconstruction with complex layered closure and dermal fat graft on 10/25/2022. He denies fever and chills. He denies nausea, vomiting, diarrhea or constipation. His pain is controlled. Physical Exam     Left lower leg incision clean, dry and intact without wound drainage or wound dehiscence. There is no erythema. He does have left leg lymphedema as was known previously. There were no vitals filed for this visit. Assessment and Plan     Ness Wheatley is doing well s/p wide excision melanoma left lower leg (Dr. Scott Min) and left lower leg reconstruction with complex layered closure and dermal fat graft on 10/25/2022. We reviewed options for scar management including vitamin E oil, silicone products, scar massage and sun protection/sun block. He will continue his lymphedema treatment. He will continue with Dr. Scott Min and his dermatologist for skin screenings and will follow up with me as needed. Questions were answered. Patient understands.

## 2022-11-10 ENCOUNTER — TELEPHONE (OUTPATIENT)
Dept: SURGERY | Facility: CLINIC | Age: 67
End: 2022-11-10

## 2022-11-10 ENCOUNTER — APPOINTMENT (OUTPATIENT)
Dept: OCCUPATIONAL MEDICINE | Facility: HOSPITAL | Age: 67
End: 2022-11-10
Attending: PHYSICIAN ASSISTANT
Payer: COMMERCIAL

## 2022-11-10 NOTE — TELEPHONE ENCOUNTER
Called patient discussed surgical pathology. Final Diagnosis:   A.  Skin, left lower leg, re-excision:  -Healing wound and changes consistent with prior procedure.  -No residual malignancy. B.  Skin, left leg 12:00 superior, excision:  -Skin with no evidence of malignancy. C.  Skin, left leg 6:00 inferior, excision:  -Skin with no evidence of malignancy. He has follow-up with dermatology at the end of this month and again in March. He will follow-up with Dr. Helio Hernandez around 6 months. He knows to call sooner with any questions or concerns.

## 2022-11-16 ENCOUNTER — OFFICE VISIT (OUTPATIENT)
Dept: OCCUPATIONAL MEDICINE | Facility: HOSPITAL | Age: 67
End: 2022-11-16
Attending: PHYSICIAN ASSISTANT
Payer: COMMERCIAL

## 2022-11-16 DIAGNOSIS — C43.72 MALIGNANT MELANOMA OF LEFT LOWER LEG (HCC): ICD-10-CM

## 2022-11-16 DIAGNOSIS — I89.0 LYMPHEDEMA: ICD-10-CM

## 2022-11-16 PROCEDURE — 97140 MANUAL THERAPY 1/> REGIONS: CPT

## 2022-11-23 ENCOUNTER — OFFICE VISIT (OUTPATIENT)
Dept: OCCUPATIONAL MEDICINE | Facility: HOSPITAL | Age: 67
End: 2022-11-23
Attending: PHYSICIAN ASSISTANT
Payer: COMMERCIAL

## 2022-11-23 DIAGNOSIS — I89.0 LYMPHEDEMA: ICD-10-CM

## 2022-11-23 DIAGNOSIS — C43.72 MALIGNANT MELANOMA OF LEFT LOWER LEG (HCC): ICD-10-CM

## 2022-11-23 PROCEDURE — 97140 MANUAL THERAPY 1/> REGIONS: CPT

## 2022-11-23 PROCEDURE — 97110 THERAPEUTIC EXERCISES: CPT

## 2022-11-28 ENCOUNTER — LAB ENCOUNTER (OUTPATIENT)
Dept: LAB | Age: 67
End: 2022-11-28
Attending: INTERNAL MEDICINE
Payer: COMMERCIAL

## 2022-11-28 DIAGNOSIS — M19.90 INFLAMMATORY ARTHRITIS: ICD-10-CM

## 2022-11-28 DIAGNOSIS — Z79.899 HIGH RISK MEDICATION USE: ICD-10-CM

## 2022-11-28 DIAGNOSIS — D80.4 SELECTIVE IGM DEFICIENCY (HCC): ICD-10-CM

## 2022-11-28 DIAGNOSIS — D72.819 LEUKOPENIA, UNSPECIFIED TYPE: ICD-10-CM

## 2022-11-28 LAB
ALBUMIN SERPL-MCNC: 3.9 G/DL (ref 3.4–5)
ALBUMIN/GLOB SERPL: 1.5 {RATIO} (ref 1–2)
ALP LIVER SERPL-CCNC: 85 U/L
ALT SERPL-CCNC: 43 U/L
ANION GAP SERPL CALC-SCNC: 7 MMOL/L (ref 0–18)
AST SERPL-CCNC: 30 U/L (ref 15–37)
BASOPHILS # BLD AUTO: 0.03 X10(3) UL (ref 0–0.2)
BASOPHILS NFR BLD AUTO: 0.6 %
BILIRUB SERPL-MCNC: 2.9 MG/DL (ref 0.1–2)
BUN BLD-MCNC: 20 MG/DL (ref 7–18)
BUN/CREAT SERPL: 15.9 (ref 10–20)
CALCIUM BLD-MCNC: 9.1 MG/DL (ref 8.5–10.1)
CHLORIDE SERPL-SCNC: 105 MMOL/L (ref 98–112)
CO2 SERPL-SCNC: 28 MMOL/L (ref 21–32)
CREAT BLD-MCNC: 1.26 MG/DL
DEPRECATED RDW RBC AUTO: 43.3 FL (ref 35.1–46.3)
EOSINOPHIL # BLD AUTO: 0.22 X10(3) UL (ref 0–0.7)
EOSINOPHIL NFR BLD AUTO: 4.7 %
ERYTHROCYTE [DISTWIDTH] IN BLOOD BY AUTOMATED COUNT: 12.6 % (ref 11–15)
FASTING STATUS PATIENT QL REPORTED: NO
GFR SERPLBLD BASED ON 1.73 SQ M-ARVRAT: 63 ML/MIN/1.73M2 (ref 60–?)
GLOBULIN PLAS-MCNC: 2.6 G/DL (ref 2.8–4.4)
GLUCOSE BLD-MCNC: 100 MG/DL (ref 70–99)
HCT VFR BLD AUTO: 44.8 %
HGB BLD-MCNC: 15.2 G/DL
IGA SERPL-MCNC: 74.3 MG/DL (ref 70–312)
IGM SERPL-MCNC: 31.8 MG/DL (ref 43–279)
IMM GRANULOCYTES # BLD AUTO: 0.06 X10(3) UL (ref 0–1)
IMM GRANULOCYTES NFR BLD: 1.3 %
IMMUNOGLOBULIN PNL SER-MCNC: 787 MG/DL (ref 791–1643)
LYMPHOCYTES # BLD AUTO: 0.95 X10(3) UL (ref 1–4)
LYMPHOCYTES NFR BLD AUTO: 20.3 %
MCH RBC QN AUTO: 31.9 PG (ref 26–34)
MCHC RBC AUTO-ENTMCNC: 33.9 G/DL (ref 31–37)
MCV RBC AUTO: 94.1 FL
MONOCYTES # BLD AUTO: 0.9 X10(3) UL (ref 0.1–1)
MONOCYTES NFR BLD AUTO: 19.2 %
NEUTROPHILS # BLD AUTO: 2.52 X10 (3) UL (ref 1.5–7.7)
NEUTROPHILS # BLD AUTO: 2.52 X10(3) UL (ref 1.5–7.7)
NEUTROPHILS NFR BLD AUTO: 53.9 %
OSMOLALITY SERPL CALC.SUM OF ELEC: 293 MOSM/KG (ref 275–295)
PLATELET # BLD AUTO: 152 10(3)UL (ref 150–450)
POTASSIUM SERPL-SCNC: 4.3 MMOL/L (ref 3.5–5.1)
PROT SERPL-MCNC: 6.5 G/DL (ref 6.4–8.2)
RBC # BLD AUTO: 4.76 X10(6)UL
SODIUM SERPL-SCNC: 140 MMOL/L (ref 136–145)
WBC # BLD AUTO: 4.7 X10(3) UL (ref 4–11)

## 2022-11-28 PROCEDURE — 85025 COMPLETE CBC W/AUTO DIFF WBC: CPT | Performed by: INTERNAL MEDICINE

## 2022-11-28 PROCEDURE — 80053 COMPREHEN METABOLIC PANEL: CPT | Performed by: INTERNAL MEDICINE

## 2022-11-28 PROCEDURE — 82784 ASSAY IGA/IGD/IGG/IGM EACH: CPT | Performed by: INTERNAL MEDICINE

## 2022-11-29 ENCOUNTER — OFFICE VISIT (OUTPATIENT)
Dept: OCCUPATIONAL MEDICINE | Facility: HOSPITAL | Age: 67
End: 2022-11-29
Attending: PHYSICIAN ASSISTANT
Payer: COMMERCIAL

## 2022-11-29 DIAGNOSIS — C43.72 MALIGNANT MELANOMA OF LEFT LOWER LEG (HCC): ICD-10-CM

## 2022-11-29 DIAGNOSIS — I89.0 LYMPHEDEMA: ICD-10-CM

## 2022-11-29 PROCEDURE — 97140 MANUAL THERAPY 1/> REGIONS: CPT

## 2022-11-30 ENCOUNTER — OFFICE VISIT (OUTPATIENT)
Dept: RHEUMATOLOGY | Facility: CLINIC | Age: 67
End: 2022-11-30
Payer: COMMERCIAL

## 2022-11-30 VITALS
RESPIRATION RATE: 16 BRPM | DIASTOLIC BLOOD PRESSURE: 80 MMHG | SYSTOLIC BLOOD PRESSURE: 142 MMHG | TEMPERATURE: 98 F | BODY MASS INDEX: 32.29 KG/M2 | HEART RATE: 76 BPM | HEIGHT: 69 IN | WEIGHT: 218 LBS | OXYGEN SATURATION: 97 %

## 2022-11-30 DIAGNOSIS — I89.0 LYMPHEDEMA: ICD-10-CM

## 2022-11-30 DIAGNOSIS — M19.90 INFLAMMATORY ARTHRITIS: Primary | ICD-10-CM

## 2022-11-30 DIAGNOSIS — D80.4 SELECTIVE IGM DEFICIENCY (HCC): ICD-10-CM

## 2022-11-30 DIAGNOSIS — M25.50 POLYARTHRALGIA: ICD-10-CM

## 2022-11-30 PROCEDURE — 99214 OFFICE O/P EST MOD 30 MIN: CPT | Performed by: INTERNAL MEDICINE

## 2022-11-30 PROCEDURE — 3077F SYST BP >= 140 MM HG: CPT | Performed by: INTERNAL MEDICINE

## 2022-11-30 PROCEDURE — 3079F DIAST BP 80-89 MM HG: CPT | Performed by: INTERNAL MEDICINE

## 2022-11-30 PROCEDURE — 3008F BODY MASS INDEX DOCD: CPT | Performed by: INTERNAL MEDICINE

## 2022-11-30 RX ORDER — PREDNISONE 1 MG/1
TABLET ORAL
Qty: 30 TABLET | Refills: 0 | Status: SHIPPED | OUTPATIENT
Start: 2022-11-30

## 2022-12-01 ENCOUNTER — APPOINTMENT (OUTPATIENT)
Dept: OCCUPATIONAL MEDICINE | Facility: HOSPITAL | Age: 67
End: 2022-12-01
Attending: PHYSICIAN ASSISTANT
Payer: COMMERCIAL

## 2022-12-06 ENCOUNTER — OFFICE VISIT (OUTPATIENT)
Dept: OCCUPATIONAL MEDICINE | Facility: HOSPITAL | Age: 67
End: 2022-12-06
Attending: PHYSICIAN ASSISTANT
Payer: COMMERCIAL

## 2022-12-06 DIAGNOSIS — I89.0 LYMPHEDEMA: ICD-10-CM

## 2022-12-06 DIAGNOSIS — C43.72 MALIGNANT MELANOMA OF LEFT LOWER LEG (HCC): ICD-10-CM

## 2022-12-06 PROCEDURE — 97140 MANUAL THERAPY 1/> REGIONS: CPT

## 2022-12-08 ENCOUNTER — OFFICE VISIT (OUTPATIENT)
Dept: OCCUPATIONAL MEDICINE | Facility: HOSPITAL | Age: 67
End: 2022-12-08
Attending: PHYSICIAN ASSISTANT
Payer: COMMERCIAL

## 2022-12-08 DIAGNOSIS — C43.72 MALIGNANT MELANOMA OF LEFT LOWER LEG (HCC): ICD-10-CM

## 2022-12-08 DIAGNOSIS — I89.0 LYMPHEDEMA: ICD-10-CM

## 2022-12-08 PROCEDURE — 97140 MANUAL THERAPY 1/> REGIONS: CPT

## 2022-12-13 ENCOUNTER — OFFICE VISIT (OUTPATIENT)
Dept: OCCUPATIONAL MEDICINE | Facility: HOSPITAL | Age: 67
End: 2022-12-13
Attending: PHYSICIAN ASSISTANT
Payer: COMMERCIAL

## 2022-12-13 DIAGNOSIS — C43.72 MALIGNANT MELANOMA OF LEFT LOWER LEG (HCC): ICD-10-CM

## 2022-12-13 DIAGNOSIS — I89.0 LYMPHEDEMA: ICD-10-CM

## 2022-12-13 PROCEDURE — 97140 MANUAL THERAPY 1/> REGIONS: CPT

## 2022-12-15 ENCOUNTER — OFFICE VISIT (OUTPATIENT)
Dept: OCCUPATIONAL MEDICINE | Facility: HOSPITAL | Age: 67
End: 2022-12-15
Attending: PHYSICIAN ASSISTANT
Payer: COMMERCIAL

## 2022-12-15 DIAGNOSIS — C43.72 MALIGNANT MELANOMA OF LEFT LOWER LEG (HCC): ICD-10-CM

## 2022-12-15 DIAGNOSIS — I89.0 LYMPHEDEMA: ICD-10-CM

## 2022-12-15 PROCEDURE — 97140 MANUAL THERAPY 1/> REGIONS: CPT

## 2022-12-15 PROCEDURE — 97110 THERAPEUTIC EXERCISES: CPT

## 2022-12-20 ENCOUNTER — OFFICE VISIT (OUTPATIENT)
Dept: OCCUPATIONAL MEDICINE | Facility: HOSPITAL | Age: 67
End: 2022-12-20
Attending: PHYSICIAN ASSISTANT
Payer: COMMERCIAL

## 2022-12-20 DIAGNOSIS — C43.72 MALIGNANT MELANOMA OF LEFT LOWER LEG (HCC): ICD-10-CM

## 2022-12-20 DIAGNOSIS — I89.0 LYMPHEDEMA: ICD-10-CM

## 2022-12-20 PROCEDURE — 97140 MANUAL THERAPY 1/> REGIONS: CPT

## 2022-12-20 PROCEDURE — 97110 THERAPEUTIC EXERCISES: CPT

## 2022-12-22 ENCOUNTER — APPOINTMENT (OUTPATIENT)
Dept: OCCUPATIONAL MEDICINE | Facility: HOSPITAL | Age: 67
End: 2022-12-22
Attending: PHYSICIAN ASSISTANT
Payer: COMMERCIAL

## 2022-12-27 ENCOUNTER — APPOINTMENT (OUTPATIENT)
Dept: OCCUPATIONAL MEDICINE | Facility: HOSPITAL | Age: 67
End: 2022-12-27
Attending: PHYSICIAN ASSISTANT
Payer: COMMERCIAL

## 2022-12-29 ENCOUNTER — OFFICE VISIT (OUTPATIENT)
Dept: OCCUPATIONAL MEDICINE | Facility: HOSPITAL | Age: 67
End: 2022-12-29
Attending: PHYSICIAN ASSISTANT
Payer: COMMERCIAL

## 2022-12-29 DIAGNOSIS — C43.72 MALIGNANT MELANOMA OF LEFT LOWER LEG (HCC): ICD-10-CM

## 2022-12-29 DIAGNOSIS — I89.0 LYMPHEDEMA: ICD-10-CM

## 2022-12-29 PROCEDURE — 97140 MANUAL THERAPY 1/> REGIONS: CPT

## 2022-12-29 PROCEDURE — 97535 SELF CARE MNGMENT TRAINING: CPT

## 2023-01-03 ENCOUNTER — TELEPHONE (OUTPATIENT)
Dept: OCCUPATIONAL MEDICINE | Facility: HOSPITAL | Age: 68
End: 2023-01-03

## 2023-01-10 ENCOUNTER — APPOINTMENT (OUTPATIENT)
Dept: OCCUPATIONAL MEDICINE | Facility: HOSPITAL | Age: 68
End: 2023-01-10
Attending: PHYSICIAN ASSISTANT
Payer: COMMERCIAL

## 2023-01-19 ENCOUNTER — TELEMEDICINE (OUTPATIENT)
Dept: TELEHEALTH | Age: 68
End: 2023-01-19

## 2023-01-19 DIAGNOSIS — R30.0 DYSURIA: ICD-10-CM

## 2023-01-19 DIAGNOSIS — R35.0 URINARY FREQUENCY: ICD-10-CM

## 2023-01-19 DIAGNOSIS — N30.00 ACUTE CYSTITIS WITHOUT HEMATURIA: Primary | ICD-10-CM

## 2023-01-19 DIAGNOSIS — R10.9 FLANK PAIN: ICD-10-CM

## 2023-01-19 PROBLEM — N30.01 ACUTE CYSTITIS WITH HEMATURIA: Status: ACTIVE | Noted: 2023-01-19

## 2023-01-19 PROCEDURE — 99214 OFFICE O/P EST MOD 30 MIN: CPT | Performed by: FAMILY MEDICINE

## 2023-01-19 RX ORDER — TAMSULOSIN HYDROCHLORIDE 0.4 MG/1
0.4 CAPSULE ORAL DAILY
Qty: 30 CAPSULE | Refills: 0 | Status: SHIPPED | OUTPATIENT
Start: 2023-01-19 | End: 2023-02-18

## 2023-01-19 RX ORDER — SULFAMETHOXAZOLE AND TRIMETHOPRIM 800; 160 MG/1; MG/1
1 TABLET ORAL 2 TIMES DAILY
Qty: 14 TABLET | Refills: 0 | Status: SHIPPED | OUTPATIENT
Start: 2023-01-19 | End: 2023-01-26

## 2023-01-26 ENCOUNTER — OFFICE VISIT (OUTPATIENT)
Dept: OCCUPATIONAL MEDICINE | Facility: HOSPITAL | Age: 68
End: 2023-01-26
Attending: PHYSICIAN ASSISTANT
Payer: COMMERCIAL

## 2023-01-26 PROCEDURE — 97110 THERAPEUTIC EXERCISES: CPT

## 2023-01-26 PROCEDURE — 97140 MANUAL THERAPY 1/> REGIONS: CPT

## 2023-02-13 ENCOUNTER — OFFICE VISIT (OUTPATIENT)
Dept: HEMATOLOGY/ONCOLOGY | Facility: HOSPITAL | Age: 68
End: 2023-02-13
Attending: INTERNAL MEDICINE
Payer: COMMERCIAL

## 2023-02-13 VITALS
OXYGEN SATURATION: 97 % | DIASTOLIC BLOOD PRESSURE: 90 MMHG | HEART RATE: 76 BPM | HEIGHT: 70 IN | WEIGHT: 207 LBS | TEMPERATURE: 97 F | SYSTOLIC BLOOD PRESSURE: 152 MMHG | RESPIRATION RATE: 18 BRPM | BODY MASS INDEX: 29.63 KG/M2

## 2023-02-13 DIAGNOSIS — C50.121 MALIGNANT NEOPLASM OF CENTRAL PORTION OF RIGHT BREAST IN MALE, ESTROGEN RECEPTOR POSITIVE (HCC): Primary | ICD-10-CM

## 2023-02-13 DIAGNOSIS — C43.72 MALIGNANT MELANOMA OF LEFT LOWER LEG (HCC): ICD-10-CM

## 2023-02-13 DIAGNOSIS — C61 PROSTATE CANCER (HCC): ICD-10-CM

## 2023-02-13 DIAGNOSIS — Z17.0 MALIGNANT NEOPLASM OF CENTRAL PORTION OF RIGHT BREAST IN MALE, ESTROGEN RECEPTOR POSITIVE (HCC): Primary | ICD-10-CM

## 2023-02-13 PROCEDURE — 99214 OFFICE O/P EST MOD 30 MIN: CPT | Performed by: INTERNAL MEDICINE

## 2023-02-13 NOTE — PROGRESS NOTES
Patient is here for follow up for breast cancer and history of prostate cancer. He had a recent episode of covid infection and has a slight cough remaining. He had a melanoma removed from his left calf in the fall. This required a wider excision after first surgery. He has had skin graft. He is feeling well overall and is back at work. He reports that he has had a sinus infection for the past 6 months. He has seen the ENT and been on multiple antibiotics. He now has a medication that he inserts high into his nasal passages to try to treat this. His rheumatoid arthritis is worse when he is sick. His doctor consider his arthritis to be reactive.      Education Record    Learner:  Patient    Disease / Diagnosis: breast cancer, prostate cancer, melanoma    Barriers / Limitations:  None   Comments:    Method:  Discussion   Comments:    General Topics:  Side effects and symptom management   Comments:    Outcome:  Shows understanding   Comments:

## 2023-04-05 RX ORDER — AZELASTINE 1 MG/ML
2 SPRAY, METERED NASAL 2 TIMES DAILY
COMMUNITY
Start: 2023-03-16

## 2023-04-06 ENCOUNTER — OFFICE VISIT (OUTPATIENT)
Dept: FAMILY MEDICINE CLINIC | Facility: CLINIC | Age: 68
End: 2023-04-06
Payer: COMMERCIAL

## 2023-04-06 VITALS
RESPIRATION RATE: 16 BRPM | OXYGEN SATURATION: 98 % | DIASTOLIC BLOOD PRESSURE: 80 MMHG | SYSTOLIC BLOOD PRESSURE: 136 MMHG | HEIGHT: 70 IN | WEIGHT: 215 LBS | HEART RATE: 67 BPM | BODY MASS INDEX: 30.78 KG/M2

## 2023-04-06 DIAGNOSIS — F51.01 PRIMARY INSOMNIA: ICD-10-CM

## 2023-04-06 DIAGNOSIS — C50.121 MALIGNANT NEOPLASM OF CENTRAL PORTION OF RIGHT BREAST IN MALE, ESTROGEN RECEPTOR POSITIVE (HCC): ICD-10-CM

## 2023-04-06 DIAGNOSIS — G47.33 OSA ON CPAP: ICD-10-CM

## 2023-04-06 DIAGNOSIS — C43.72 MALIGNANT MELANOMA OF LEFT LOWER LEG (HCC): ICD-10-CM

## 2023-04-06 DIAGNOSIS — J44.9 CHRONIC OBSTRUCTIVE PULMONARY DISEASE, UNSPECIFIED COPD TYPE (HCC): Primary | ICD-10-CM

## 2023-04-06 DIAGNOSIS — Z99.89 OSA ON CPAP: ICD-10-CM

## 2023-04-06 DIAGNOSIS — Z00.00 LABORATORY EXAMINATION ORDERED AS PART OF A ROUTINE GENERAL MEDICAL EXAMINATION: ICD-10-CM

## 2023-04-06 DIAGNOSIS — D69.6 THROMBOCYTOPENIA (HCC): ICD-10-CM

## 2023-04-06 DIAGNOSIS — C61 PROSTATE CANCER (HCC): ICD-10-CM

## 2023-04-06 DIAGNOSIS — R73.9 HYPERGLYCEMIA: ICD-10-CM

## 2023-04-06 DIAGNOSIS — Z17.0 MALIGNANT NEOPLASM OF CENTRAL PORTION OF RIGHT BREAST IN MALE, ESTROGEN RECEPTOR POSITIVE (HCC): ICD-10-CM

## 2023-04-06 DIAGNOSIS — K76.0 NAFLD (NONALCOHOLIC FATTY LIVER DISEASE): ICD-10-CM

## 2023-04-06 DIAGNOSIS — E78.2 MIXED HYPERLIPIDEMIA: ICD-10-CM

## 2023-04-06 PROCEDURE — 3079F DIAST BP 80-89 MM HG: CPT | Performed by: FAMILY MEDICINE

## 2023-04-06 PROCEDURE — 3008F BODY MASS INDEX DOCD: CPT | Performed by: FAMILY MEDICINE

## 2023-04-06 PROCEDURE — 90750 HZV VACC RECOMBINANT IM: CPT | Performed by: FAMILY MEDICINE

## 2023-04-06 PROCEDURE — 3075F SYST BP GE 130 - 139MM HG: CPT | Performed by: FAMILY MEDICINE

## 2023-04-06 PROCEDURE — 90471 IMMUNIZATION ADMIN: CPT | Performed by: FAMILY MEDICINE

## 2023-04-06 PROCEDURE — 99214 OFFICE O/P EST MOD 30 MIN: CPT | Performed by: FAMILY MEDICINE

## 2023-04-06 RX ORDER — ATORVASTATIN CALCIUM 80 MG/1
80 TABLET, FILM COATED ORAL DAILY
Qty: 90 TABLET | Refills: 3 | Status: SHIPPED | OUTPATIENT
Start: 2023-04-06

## 2023-04-06 RX ORDER — ZOLPIDEM TARTRATE 12.5 MG/1
TABLET, FILM COATED, EXTENDED RELEASE ORAL
Qty: 90 TABLET | Refills: 1 | Status: SHIPPED | OUTPATIENT
Start: 2023-04-06

## 2023-04-06 RX ORDER — SILDENAFIL 100 MG/1
100 TABLET, FILM COATED ORAL
Qty: 30 TABLET | Refills: 3 | Status: SHIPPED | OUTPATIENT
Start: 2023-04-06

## 2023-04-06 NOTE — ASSESSMENT & PLAN NOTE
Cholesterol shows Good control. Cholesterol: 109, done on 8/5/2020. HDL Cholesterol: 33, done on 8/5/2020. TriGlycerides 120, done on 8/5/2020. LDL Cholesterol: 52, done on 8/5/2020. Cholesterol medications include atorvastatin 80 MG Oral Tab [[id number]].

## 2023-04-06 NOTE — ASSESSMENT & PLAN NOTE
Last Platelet value was 766 done 11/28/2022.  Lowest level in the last 10 years was 119. stable, continue present management

## 2023-04-06 NOTE — ASSESSMENT & PLAN NOTE
Stable, continue present management   Lab Results   Component Value Date    ALT 43 11/28/2022    ALT 42 10/22/2022    ALT 43 09/09/2022    AST 30 11/28/2022    AST 26 10/22/2022    AST 39 (H) 09/09/2022

## 2023-04-07 ENCOUNTER — OFFICE VISIT (OUTPATIENT)
Dept: RHEUMATOLOGY | Facility: CLINIC | Age: 68
End: 2023-04-07
Payer: COMMERCIAL

## 2023-04-07 VITALS
RESPIRATION RATE: 16 BRPM | OXYGEN SATURATION: 97 % | HEART RATE: 72 BPM | HEIGHT: 70 IN | DIASTOLIC BLOOD PRESSURE: 70 MMHG | TEMPERATURE: 97 F | WEIGHT: 214 LBS | SYSTOLIC BLOOD PRESSURE: 122 MMHG | BODY MASS INDEX: 30.64 KG/M2

## 2023-04-07 DIAGNOSIS — J32.8 OTHER CHRONIC SINUSITIS: ICD-10-CM

## 2023-04-07 DIAGNOSIS — I89.0 LYMPHEDEMA: ICD-10-CM

## 2023-04-07 DIAGNOSIS — M19.90 INFLAMMATORY ARTHRITIS: Primary | ICD-10-CM

## 2023-04-07 DIAGNOSIS — M25.50 POLYARTHRALGIA: ICD-10-CM

## 2023-04-07 PROCEDURE — 3008F BODY MASS INDEX DOCD: CPT | Performed by: INTERNAL MEDICINE

## 2023-04-07 PROCEDURE — 3078F DIAST BP <80 MM HG: CPT | Performed by: INTERNAL MEDICINE

## 2023-04-07 PROCEDURE — 99214 OFFICE O/P EST MOD 30 MIN: CPT | Performed by: INTERNAL MEDICINE

## 2023-04-07 PROCEDURE — 3074F SYST BP LT 130 MM HG: CPT | Performed by: INTERNAL MEDICINE

## 2023-04-10 ENCOUNTER — PATIENT MESSAGE (OUTPATIENT)
Dept: RHEUMATOLOGY | Facility: CLINIC | Age: 68
End: 2023-04-10

## 2023-04-10 DIAGNOSIS — M19.90 INFLAMMATORY ARTHRITIS: ICD-10-CM

## 2023-04-10 DIAGNOSIS — M25.532 LEFT WRIST PAIN: Primary | ICD-10-CM

## 2023-04-17 ENCOUNTER — TELEPHONE (OUTPATIENT)
Dept: RHEUMATOLOGY | Facility: CLINIC | Age: 68
End: 2023-04-17

## 2023-04-17 DIAGNOSIS — M19.90 INFLAMMATORY ARTHRITIS: Primary | ICD-10-CM

## 2023-04-17 DIAGNOSIS — M25.532 LEFT WRIST PAIN: ICD-10-CM

## 2023-04-17 NOTE — TELEPHONE ENCOUNTER
Called [facility] and informed them that Dr. Sarah Blandon changed her order to with and without contrast. Verbalized understanding.

## 2023-04-17 NOTE — TELEPHONE ENCOUNTER
[facility] phoned office, MRI must be ordered as with and without,or just without. With this dx should not need contrast. Please change order.

## 2023-04-17 NOTE — TELEPHONE ENCOUNTER
[facility] called again asking how Dr. Juany Garcia would like to proceed with ordering. Pt has appointment on Wednesday and they are having a hard time getting imaging approved as is. Previous imaging was ordered with and without and was approved.

## 2023-04-24 ENCOUNTER — TELEPHONE (OUTPATIENT)
Dept: RHEUMATOLOGY | Facility: CLINIC | Age: 68
End: 2023-04-24

## 2023-04-24 DIAGNOSIS — M19.90 INFLAMMATORY ARTHRITIS: Primary | ICD-10-CM

## 2023-04-24 DIAGNOSIS — M25.50 POLYARTHRALGIA: ICD-10-CM

## 2023-04-24 RX ORDER — COLCHICINE 0.6 MG/1
0.6 CAPSULE ORAL 2 TIMES DAILY PRN
Qty: 60 CAPSULE | Refills: 0 | Status: SHIPPED | OUTPATIENT
Start: 2023-04-24 | End: 2023-05-24

## 2023-04-27 ENCOUNTER — LAB ENCOUNTER (OUTPATIENT)
Dept: LAB | Age: 68
End: 2023-04-27
Attending: INTERNAL MEDICINE
Payer: COMMERCIAL

## 2023-04-27 DIAGNOSIS — M25.50 POLYARTHRALGIA: ICD-10-CM

## 2023-04-27 DIAGNOSIS — M19.90 INFLAMMATORY ARTHRITIS: ICD-10-CM

## 2023-04-27 DIAGNOSIS — J32.8 OTHER CHRONIC SINUSITIS: ICD-10-CM

## 2023-04-27 LAB
ALBUMIN SERPL-MCNC: 3.9 G/DL (ref 3.4–5)
ALBUMIN/GLOB SERPL: 1.3 {RATIO} (ref 1–2)
ALP LIVER SERPL-CCNC: 79 U/L
ALT SERPL-CCNC: 40 U/L
ANION GAP SERPL CALC-SCNC: 3 MMOL/L (ref 0–18)
AST SERPL-CCNC: 27 U/L (ref 15–37)
BILIRUB SERPL-MCNC: 2 MG/DL (ref 0.1–2)
BUN BLD-MCNC: 14 MG/DL (ref 7–18)
CALCIUM BLD-MCNC: 8.8 MG/DL (ref 8.5–10.1)
CHLORIDE SERPL-SCNC: 111 MMOL/L (ref 98–112)
CO2 SERPL-SCNC: 26 MMOL/L (ref 21–32)
CREAT BLD-MCNC: 1.09 MG/DL
CRP SERPL-MCNC: <0.29 MG/DL (ref ?–0.3)
ERYTHROCYTE [SEDIMENTATION RATE] IN BLOOD: 5 MM/HR
FASTING STATUS PATIENT QL REPORTED: YES
GFR SERPLBLD BASED ON 1.73 SQ M-ARVRAT: 75 ML/MIN/1.73M2 (ref 60–?)
GLOBULIN PLAS-MCNC: 3 G/DL (ref 2.8–4.4)
GLUCOSE BLD-MCNC: 123 MG/DL (ref 70–99)
OSMOLALITY SERPL CALC.SUM OF ELEC: 292 MOSM/KG (ref 275–295)
POTASSIUM SERPL-SCNC: 4 MMOL/L (ref 3.5–5.1)
PROT SERPL-MCNC: 6.9 G/DL (ref 6.4–8.2)
SODIUM SERPL-SCNC: 140 MMOL/L (ref 136–145)

## 2023-04-27 PROCEDURE — 86036 ANCA SCREEN EACH ANTIBODY: CPT

## 2023-04-27 PROCEDURE — 80053 COMPREHEN METABOLIC PANEL: CPT

## 2023-04-27 PROCEDURE — 83516 IMMUNOASSAY NONANTIBODY: CPT

## 2023-04-27 PROCEDURE — 86140 C-REACTIVE PROTEIN: CPT

## 2023-04-27 PROCEDURE — 85652 RBC SED RATE AUTOMATED: CPT

## 2023-05-08 ENCOUNTER — TELEPHONE (OUTPATIENT)
Dept: FAMILY MEDICINE CLINIC | Facility: CLINIC | Age: 68
End: 2023-05-08

## 2023-05-08 ENCOUNTER — HOSPITAL ENCOUNTER (EMERGENCY)
Facility: HOSPITAL | Age: 68
Discharge: HOME | End: 2023-05-08
Attending: EMERGENCY MEDICINE
Payer: COMMERCIAL

## 2023-05-08 ENCOUNTER — APPOINTMENT (OUTPATIENT)
Dept: GENERAL RADIOLOGY | Facility: HOSPITAL | Age: 68
End: 2023-05-08
Attending: EMERGENCY MEDICINE
Payer: COMMERCIAL

## 2023-05-08 VITALS
OXYGEN SATURATION: 95 % | RESPIRATION RATE: 16 BRPM | HEART RATE: 86 BPM | TEMPERATURE: 98 F | SYSTOLIC BLOOD PRESSURE: 154 MMHG | DIASTOLIC BLOOD PRESSURE: 101 MMHG

## 2023-05-08 DIAGNOSIS — R91.1 LEFT UPPER LOBE PULMONARY NODULE: Primary | ICD-10-CM

## 2023-05-08 DIAGNOSIS — I15.9 SECONDARY HYPERTENSION: ICD-10-CM

## 2023-05-08 DIAGNOSIS — I47.1 SVT (SUPRAVENTRICULAR TACHYCARDIA) (HCC): Primary | ICD-10-CM

## 2023-05-08 LAB
ALBUMIN SERPL-MCNC: 4.3 G/DL (ref 3.4–5)
ALBUMIN/GLOB SERPL: 1.2 {RATIO} (ref 1–2)
ALP LIVER SERPL-CCNC: 90 U/L
ALT SERPL-CCNC: 41 U/L
ANCA BY IFA (RDL): NEGATIVE
ANION GAP SERPL CALC-SCNC: 4 MMOL/L (ref 0–18)
ANTI-MPO AB (RDL): <20 UNITS
ANTI-PR-3 AB (RDL): <20 UNITS
AST SERPL-CCNC: 34 U/L (ref 15–37)
ATRIAL RATE: 101 BPM
BASOPHILS # BLD AUTO: 0.05 X10(3) UL (ref 0–0.2)
BASOPHILS NFR BLD AUTO: 0.8 %
BILIRUB SERPL-MCNC: 1.9 MG/DL (ref 0.1–2)
BUN BLD-MCNC: 17 MG/DL (ref 7–18)
CALCIUM BLD-MCNC: 9.4 MG/DL (ref 8.5–10.1)
CHLORIDE SERPL-SCNC: 111 MMOL/L (ref 98–112)
CO2 SERPL-SCNC: 24 MMOL/L (ref 21–32)
CREAT BLD-MCNC: 1.14 MG/DL
EOSINOPHIL # BLD AUTO: 0.17 X10(3) UL (ref 0–0.7)
EOSINOPHIL NFR BLD AUTO: 2.6 %
ERYTHROCYTE [DISTWIDTH] IN BLOOD BY AUTOMATED COUNT: 12.6 %
GFR SERPLBLD BASED ON 1.73 SQ M-ARVRAT: 71 ML/MIN/1.73M2 (ref 60–?)
GLOBULIN PLAS-MCNC: 3.5 G/DL (ref 2.8–4.4)
GLUCOSE BLD-MCNC: 94 MG/DL (ref 70–99)
HCT VFR BLD AUTO: 49.3 %
HGB BLD-MCNC: 17.1 G/DL
IMM GRANULOCYTES # BLD AUTO: 0.05 X10(3) UL (ref 0–1)
IMM GRANULOCYTES NFR BLD: 0.8 %
LYMPHOCYTES # BLD AUTO: 1.38 X10(3) UL (ref 1–4)
LYMPHOCYTES NFR BLD AUTO: 21.1 %
MAGNESIUM SERPL-MCNC: 2.3 MG/DL (ref 1.6–2.6)
MCH RBC QN AUTO: 32.1 PG (ref 26–34)
MCHC RBC AUTO-ENTMCNC: 34.7 G/DL (ref 31–37)
MCV RBC AUTO: 92.5 FL
MONOCYTES # BLD AUTO: 1.05 X10(3) UL (ref 0.1–1)
MONOCYTES NFR BLD AUTO: 16.1 %
NEUTROPHILS # BLD AUTO: 3.83 X10 (3) UL (ref 1.5–7.7)
NEUTROPHILS # BLD AUTO: 3.83 X10(3) UL (ref 1.5–7.7)
NEUTROPHILS NFR BLD AUTO: 58.6 %
OSMOLALITY SERPL CALC.SUM OF ELEC: 289 MOSM/KG (ref 275–295)
P AXIS: 50 DEGREES
P-R INTERVAL: 180 MS
PLATELET # BLD AUTO: 152 10(3)UL (ref 150–450)
POTASSIUM SERPL-SCNC: 3.8 MMOL/L (ref 3.5–5.1)
PROT SERPL-MCNC: 7.8 G/DL (ref 6.4–8.2)
Q-T INTERVAL: 246 MS
Q-T INTERVAL: 300 MS
QRS DURATION: 84 MS
QRS DURATION: 88 MS
QTC CALCULATION (BEZET): 389 MS
QTC CALCULATION (BEZET): 429 MS
R AXIS: 43 DEGREES
R AXIS: 44 DEGREES
RBC # BLD AUTO: 5.33 X10(6)UL
SODIUM SERPL-SCNC: 139 MMOL/L (ref 136–145)
T AXIS: 263 DEGREES
T AXIS: 56 DEGREES
TROPONIN I HIGH SENSITIVITY: 46 NG/L
TSI SER-ACNC: 2.72 MIU/ML (ref 0.36–3.74)
VENTRICULAR RATE: 101 BPM
VENTRICULAR RATE: 183 BPM
WBC # BLD AUTO: 6.5 X10(3) UL (ref 4–11)

## 2023-05-08 PROCEDURE — 83735 ASSAY OF MAGNESIUM: CPT | Performed by: EMERGENCY MEDICINE

## 2023-05-08 PROCEDURE — 71045 X-RAY EXAM CHEST 1 VIEW: CPT | Performed by: EMERGENCY MEDICINE

## 2023-05-08 PROCEDURE — 80053 COMPREHEN METABOLIC PANEL: CPT | Performed by: EMERGENCY MEDICINE

## 2023-05-08 PROCEDURE — 84443 ASSAY THYROID STIM HORMONE: CPT | Performed by: EMERGENCY MEDICINE

## 2023-05-08 PROCEDURE — 93010 ELECTROCARDIOGRAM REPORT: CPT

## 2023-05-08 PROCEDURE — 85025 COMPLETE CBC W/AUTO DIFF WBC: CPT | Performed by: EMERGENCY MEDICINE

## 2023-05-08 PROCEDURE — 99285 EMERGENCY DEPT VISIT HI MDM: CPT

## 2023-05-08 PROCEDURE — 96374 THER/PROPH/DIAG INJ IV PUSH: CPT

## 2023-05-08 PROCEDURE — 93005 ELECTROCARDIOGRAM TRACING: CPT

## 2023-05-08 PROCEDURE — 84484 ASSAY OF TROPONIN QUANT: CPT | Performed by: EMERGENCY MEDICINE

## 2023-05-08 RX ORDER — ADENOSINE 3 MG/ML
6 INJECTION, SOLUTION INTRAVENOUS ONCE
Status: COMPLETED | OUTPATIENT
Start: 2023-05-08 | End: 2023-05-08

## 2023-05-08 RX ORDER — ADENOSINE 3 MG/ML
INJECTION, SOLUTION INTRAVENOUS
Status: COMPLETED
Start: 2023-05-08 | End: 2023-05-08

## 2023-05-08 RX ORDER — PANTOPRAZOLE SODIUM 40 MG/1
40 TABLET, DELAYED RELEASE ORAL 2 TIMES DAILY
COMMUNITY
Start: 2023-05-05

## 2023-05-08 RX ORDER — METOPROLOL SUCCINATE 25 MG/1
25 TABLET, EXTENDED RELEASE ORAL DAILY
Qty: 30 TABLET | Refills: 0 | Status: SHIPPED | OUTPATIENT
Start: 2023-05-08

## 2023-05-08 RX ORDER — ASPIRIN 81 MG/1
324 TABLET, CHEWABLE ORAL ONCE
Status: COMPLETED | OUTPATIENT
Start: 2023-05-08 | End: 2023-05-08

## 2023-05-08 NOTE — DISCHARGE INSTRUCTIONS
Follow-up with your primary care physician, cardiologist.  If you have any severe chest pain or shortness of breath you need to return to the emergency room. Follow-up with your cardiologist.  Take 1 baby aspirin daily. Recheck your blood pressure if it is persistently high you need to recheck you need to start the Toprol if it is above 150/100. Start the Toprol. If you have any chest pain shortness of breath return to the emergency room. You were seen in the emergency room in a limited time. There is a possibility that although we do not see any acute process at this present time that things can change with time. Is therefore imperative that you follow-up with primary care physician for close follow-up. If there is any significant progression of your pain  or other symptoms you to return immediately to the emergency room.

## 2023-05-11 PROBLEM — I47.1 SVT (SUPRAVENTRICULAR TACHYCARDIA): Status: ACTIVE | Noted: 2023-05-11

## 2023-05-11 PROBLEM — I47.10 SVT (SUPRAVENTRICULAR TACHYCARDIA): Status: ACTIVE | Noted: 2023-05-11

## 2023-05-11 PROBLEM — I47.10 SVT (SUPRAVENTRICULAR TACHYCARDIA) (HCC): Status: ACTIVE | Noted: 2023-05-11

## 2023-05-11 PROBLEM — I47.1 SVT (SUPRAVENTRICULAR TACHYCARDIA) (HCC): Status: ACTIVE | Noted: 2023-05-11

## 2023-05-12 ENCOUNTER — OFFICE VISIT (OUTPATIENT)
Dept: FAMILY MEDICINE CLINIC | Facility: CLINIC | Age: 68
End: 2023-05-12
Payer: COMMERCIAL

## 2023-05-12 VITALS
WEIGHT: 208.38 LBS | RESPIRATION RATE: 16 BRPM | SYSTOLIC BLOOD PRESSURE: 130 MMHG | DIASTOLIC BLOOD PRESSURE: 70 MMHG | HEART RATE: 94 BPM | HEIGHT: 69.5 IN | BODY MASS INDEX: 30.17 KG/M2

## 2023-05-12 DIAGNOSIS — G47.33 OSA ON CPAP: ICD-10-CM

## 2023-05-12 DIAGNOSIS — C50.121 MALIGNANT NEOPLASM OF CENTRAL PORTION OF RIGHT BREAST IN MALE, ESTROGEN RECEPTOR POSITIVE (HCC): ICD-10-CM

## 2023-05-12 DIAGNOSIS — Z99.89 OSA ON CPAP: ICD-10-CM

## 2023-05-12 DIAGNOSIS — Z17.0 MALIGNANT NEOPLASM OF CENTRAL PORTION OF RIGHT BREAST IN MALE, ESTROGEN RECEPTOR POSITIVE (HCC): ICD-10-CM

## 2023-05-12 DIAGNOSIS — I47.1 SVT (SUPRAVENTRICULAR TACHYCARDIA) (HCC): Primary | ICD-10-CM

## 2023-05-12 PROCEDURE — 99214 OFFICE O/P EST MOD 30 MIN: CPT | Performed by: FAMILY MEDICINE

## 2023-05-12 PROCEDURE — 3008F BODY MASS INDEX DOCD: CPT | Performed by: FAMILY MEDICINE

## 2023-05-12 PROCEDURE — 3078F DIAST BP <80 MM HG: CPT | Performed by: FAMILY MEDICINE

## 2023-05-12 PROCEDURE — 3075F SYST BP GE 130 - 139MM HG: CPT | Performed by: FAMILY MEDICINE

## 2023-05-12 NOTE — ASSESSMENT & PLAN NOTE
Currently asymptomatic, he received metoprolol but has not filled it yet. No current symptomatologies and no recurrence of symptoms. We discussed things that could make it better including Valsalva maneuvers and when to follow-up.   He has a July 2023 electro cardiology evaluation with Dr. Khoa Hill and we discussed following up at that time

## 2023-05-12 NOTE — ASSESSMENT & PLAN NOTE
Stable overall. Discussed getting COVID booster vaccine with this history which would be very appropriate. Wedding in 2 weeks so we will get that as well.   Right arm precautions as well

## 2023-05-18 ENCOUNTER — HOSPITAL ENCOUNTER (OUTPATIENT)
Dept: CT IMAGING | Facility: HOSPITAL | Age: 68
Discharge: HOME | End: 2023-05-18
Attending: FAMILY MEDICINE
Payer: COMMERCIAL

## 2023-05-18 DIAGNOSIS — R91.1 LEFT UPPER LOBE PULMONARY NODULE: ICD-10-CM

## 2023-05-18 PROCEDURE — 71260 CT THORAX DX C+: CPT | Performed by: FAMILY MEDICINE

## 2023-05-29 DIAGNOSIS — M19.90 INFLAMMATORY ARTHRITIS: ICD-10-CM

## 2023-05-30 NOTE — TELEPHONE ENCOUNTER
Future Appointments   Date Time Provider Magdaleno [place]   8/16/2023  3:30 PM Edna Steele MD [facility] HEM [address]   9/1/2023  8:00 AM Sammie Anderson MD [facility] [place]   9/12/2023  8:30 AM Robbin Ware MD [facility] Surg/Onc   10/6/2023  7:45 AM Tori Larry MD [facility]     LOV  4/7/2023    Last refill  4/24/2023  60 caps, 0 refills

## 2023-05-31 RX ORDER — COLCHICINE 0.6 MG/1
CAPSULE ORAL
Qty: 60 CAPSULE | Refills: 0 | Status: SHIPPED | OUTPATIENT
Start: 2023-05-31

## 2023-05-31 NOTE — TELEPHONE ENCOUNTER
Called pt to confirm if the colchicine was working for him or not. He stated that it was hard to tell since he had been focusing on some other health issues that popped up. (SVT and knee aspiration just to name a few). He does want to keep trying it though. Routing to partner for approval since dr. Destin Woodall is out of the office.

## 2023-06-21 ENCOUNTER — TELEPHONE (OUTPATIENT)
Dept: FAMILY MEDICINE CLINIC | Facility: CLINIC | Age: 68
End: 2023-06-21

## 2023-07-16 NOTE — TELEPHONE ENCOUNTER
Called pt and discussed MRI results. Still difficult to differentiate between OA vs inflammatory arthritis vs crystalline arthropathy. Would like to do a trial of colchicine bid for the next 2 weeks and monitor response. If no response, would like him to consider evaluation by hand surgeon before we consider stepping up immunosuppression. Patient did have borderline renal function in November so I recommended he get the labs done this week before starting the colchicine. We will also have staff keep co-pay card at  and patient will come by the office later this week to pick it up. Discussed risks/benefits of colchicine use. Discussed potential side effects such as renal or hepatic impairment. There can also be some hematologic complications such as anemia, leukopenia or thrombocytopenia. Rarely patients can get neuromuscular toxicity and rhabdomyolysis. Most commonly patients can develop upset stomach with diarrhea. Patient verbalized understanding of above instructions. No further questions at this time. Heri Montano DO  [facility]  4/24/2023          Exam: MRI WRIST LT W/O CONTRAST   CPT Code(s): 47917 - MRI JOINT UPR EXTREM W/O DYE     CLINICAL HISTORY: Inflammatory arthritis (M19.90). Left wrist pain (M25.532). COMPARISON: Left wrist MRI, 6/25/2021, from [facility].     TECHNIQUE: Left wrist MRI without contrast. Exam performed on an ultra high field 3.0 Yanel[employer] MR scanner. FINDINGS: Multiple subchondral cysts are identified in the distal radius underlying the radiocarpal joint measuring 1.7 cm and smaller. Mild subchondral bone marrow edema is also present in the distal radius underlying the radiocarpal joint. Osteophytes   in the dorsal margin of the distal radius. Severe articular cartilage thinning at the radiocarpal joint. Severe articular cartilage thinning at the distal radioulnar joint with osteophytes at the margins of the margin of the distal ulna.  The ulnar styloid is blunted, and there is mild bone marrow edema in the distal ulna. Small joint effusion and synovitis at the distal radioulnar joint, in addition to a 1.0 cm corticated ossific loose body in the volar aspect of the distal radioulnar joint. Subcentimeter subchondral cysts versus erosions and mild subchondral bone marrow edema in the scaphoid and lunate. Subcentimeter subchondral cysts in the distal capitate and in the second and third metacarpal bases underlying the carpometacarpal joints,   in addition to mild subchondral bone marrow edema in the second metacarpal base underlying the second carpometacarpal joint. Osteophytes and moderate joint space narrowing at the first carpometacarpal joint. A subcentimeter corticated intra-articular   loose body is identified along the ulnar margin of the first carpometacarpal joint. Small midcarpal joint effusion. No fracture or dislocation are identified. The distal ulna abuts the proximal surface of the lunate. Extensive full-thickness tearing/maceration of the triangular fibrocartilage. The scapholunate interval is mildly widened measuring 4 mm. There is mild ill-defined increased proton density signal in the scapholunate ligament, most pronounced in the membranous band, although no discrete fluid signal tear is identified in the   scapholunate ligament. The lunotriquetral ligament appears grossly intact, and the lunotriquetral interval is normal.     Subcentimeter ganglion cysts along the course of the radioscaphocapitate and long radiolunate ligaments near the radial attachment. Subcentimeter ganglion cysts along the volar margin of the capitate-hamate articulation, third carpometacarpal joint, and   triquetral-hamate articulation. Mild to moderate tendinosis of the extensor carpi ulnaris tendon. Small amount of fluid in the extensor carpi ulnaris tendon sheath.  The remainder of the left wrist extensor tendons and the left wrist flexor tendons are intact and normal in caliber. The   mild ill-defined increased proton density signal in the tendons in extensor compartments 3 and 4 is less conspicuous on the axial T2-weighted sequence and is likely due to \"magic angle\" effect rather than mild tendinosis. Neurovascular structures are unremarkable. IMPRESSION:   1. Large subchondral cysts in the distal radius and mild subchondral bone marrow edema underlying the radiocarpal joint. Severe articular cartilage thinning at the radiocarpal joint and the distal radioulnar joint. Blunted ulnar styloid which appears   eroded. Subchondral cysts versus erosions and mild subchondral bone marrow edema in the scaphoid and lunate. Subchondral cysts at the second and third carpometacarpal joints. Osteophytes at the distal radioulnar joint, radiocarpal joint, and first   carpometacarpal joint. Small joint effusion and synovitis at the distal radioulnar joint, corticated ossific loose bodies in the distal radioulnar joint and first carpometacarpal joint, and small midcarpal joint effusion. 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 Similar findings were noted on the prior MRI, although the previously seen extraosseous   ganglion cyst at the volar margin of the distal radius is no longer present. 2. Extensive full-thickness tearing/maceration of the triangular fibrocartilage. 3. Mildly widened scapholunate interval with ill-defined increased proton density signal compatible with degeneration of the scapholunate ligament, although no discrete fluid signal tear is identified in the scapholunate ligament. This is similar to the   prior MRI. 4. Mild to moderate tendinosis of the extensor carpi ulnaris tendon, and mild extensor carpi ulnaris tenosynovitis.      5. Subcentimeter ganglion cysts along the course of the radioscaphocapitate and long radiolunate ligaments near the radial attachment may reflect the sequela of sprains of the volar extrinsic carpal ligaments. Interpreting Radiologist:     Gela Oakley M.D.    Electronically Signed: 04/20/2023 09:47 AM given to family

## 2023-08-08 NOTE — ED NOTES
Called to patient and  verified for identification. Made aware of final urine culture results. No further questions when asked.  used

## 2023-08-16 ENCOUNTER — OFFICE VISIT (OUTPATIENT)
Dept: HEMATOLOGY/ONCOLOGY | Facility: HOSPITAL | Age: 68
End: 2023-08-16
Attending: INTERNAL MEDICINE
Payer: COMMERCIAL

## 2023-08-16 VITALS
TEMPERATURE: 98 F | BODY MASS INDEX: 31 KG/M2 | OXYGEN SATURATION: 97 % | HEART RATE: 69 BPM | SYSTOLIC BLOOD PRESSURE: 132 MMHG | DIASTOLIC BLOOD PRESSURE: 80 MMHG | WEIGHT: 210.5 LBS

## 2023-08-16 DIAGNOSIS — C43.72 MALIGNANT MELANOMA OF LEFT LOWER LEG (HCC): ICD-10-CM

## 2023-08-16 DIAGNOSIS — C50.121 MALIGNANT NEOPLASM OF CENTRAL PORTION OF RIGHT BREAST IN MALE, ESTROGEN RECEPTOR POSITIVE: Primary | ICD-10-CM

## 2023-08-16 DIAGNOSIS — Z17.0 MALIGNANT NEOPLASM OF CENTRAL PORTION OF RIGHT BREAST IN MALE, ESTROGEN RECEPTOR POSITIVE: Primary | ICD-10-CM

## 2023-08-16 PROCEDURE — 99213 OFFICE O/P EST LOW 20 MIN: CPT | Performed by: INTERNAL MEDICINE

## 2023-08-16 NOTE — PROGRESS NOTES
Patient is here for follow up for prostate cancer, breast cancer and melanoma. He had episode of SVT and was in ED in May. He is feeling well now and following up with cardiology. He denies any fever, cough or shortness of breath. He reports that he feels tired but he remains active. He is eating well. He is having more lymphedema in his left leg. He has a pain in his right side that is intermittent and stable. He believes this is also lymphedema related.

## 2023-09-16 ENCOUNTER — TELEPHONE (OUTPATIENT)
Dept: FAMILY MEDICINE CLINIC | Facility: CLINIC | Age: 68
End: 2023-09-16

## 2023-09-16 DIAGNOSIS — Z13.0 SCREENING FOR IRON DEFICIENCY ANEMIA: ICD-10-CM

## 2023-09-16 DIAGNOSIS — J44.9 CHRONIC OBSTRUCTIVE PULMONARY DISEASE, UNSPECIFIED COPD TYPE (HCC): ICD-10-CM

## 2023-09-16 DIAGNOSIS — E78.2 MIXED HYPERLIPIDEMIA: Primary | ICD-10-CM

## 2023-09-16 DIAGNOSIS — I10 ESSENTIAL HYPERTENSION: ICD-10-CM

## 2023-09-16 DIAGNOSIS — E55.9 VITAMIN D DEFICIENCY: ICD-10-CM

## 2023-09-16 DIAGNOSIS — Z13.29 SCREENING FOR THYROID DISORDER: ICD-10-CM

## 2023-09-16 DIAGNOSIS — Z13.6 SCREENING FOR CARDIOVASCULAR CONDITION: ICD-10-CM

## 2023-09-16 DIAGNOSIS — Z79.899 LONG-TERM USE OF HIGH-RISK MEDICATION: ICD-10-CM

## 2023-09-16 DIAGNOSIS — C43.72 MALIGNANT MELANOMA OF LEFT LOWER LEG (HCC): ICD-10-CM

## 2023-09-16 DIAGNOSIS — C61 PROSTATE CANCER (HCC): ICD-10-CM

## 2023-09-16 DIAGNOSIS — Z00.00 LABORATORY EXAMINATION ORDERED AS PART OF A ROUTINE GENERAL MEDICAL EXAMINATION: ICD-10-CM

## 2023-09-16 DIAGNOSIS — Z13.1 SCREENING FOR DIABETES MELLITUS: ICD-10-CM

## 2023-09-16 DIAGNOSIS — Z12.5 SCREENING FOR PROSTATE CANCER: ICD-10-CM

## 2023-09-16 DIAGNOSIS — E78.5 DYSLIPIDEMIA: ICD-10-CM

## 2023-09-19 ENCOUNTER — OFFICE VISIT (OUTPATIENT)
Dept: SURGERY | Facility: CLINIC | Age: 68
End: 2023-09-19
Payer: COMMERCIAL

## 2023-09-19 VITALS
BODY MASS INDEX: 30 KG/M2 | TEMPERATURE: 98 F | HEART RATE: 68 BPM | SYSTOLIC BLOOD PRESSURE: 150 MMHG | DIASTOLIC BLOOD PRESSURE: 95 MMHG | RESPIRATION RATE: 17 BRPM | WEIGHT: 204.81 LBS | OXYGEN SATURATION: 96 %

## 2023-09-19 DIAGNOSIS — Z85.3 HISTORY OF RIGHT BREAST CANCER: ICD-10-CM

## 2023-09-19 DIAGNOSIS — N64.4 BREAST PAIN, LEFT: Primary | ICD-10-CM

## 2023-09-19 DIAGNOSIS — C43.72 MALIGNANT MELANOMA OF LEFT LOWER LEG (HCC): ICD-10-CM

## 2023-09-19 PROCEDURE — 99213 OFFICE O/P EST LOW 20 MIN: CPT | Performed by: SURGERY

## 2023-09-19 PROCEDURE — 3080F DIAST BP >= 90 MM HG: CPT | Performed by: SURGERY

## 2023-09-19 PROCEDURE — 3077F SYST BP >= 140 MM HG: CPT | Performed by: SURGERY

## 2023-09-21 PROBLEM — Z85.3 HISTORY OF RIGHT BREAST CANCER: Status: ACTIVE | Noted: 2023-09-21

## 2023-10-20 ENCOUNTER — LAB ENCOUNTER (OUTPATIENT)
Dept: LAB | Age: 68
End: 2023-10-20
Attending: SURGERY

## 2023-10-20 ENCOUNTER — HOSPITAL ENCOUNTER (OUTPATIENT)
Dept: MAMMOGRAPHY | Age: 68
Discharge: HOME | End: 2023-10-20
Attending: SURGERY

## 2023-10-20 DIAGNOSIS — F51.01 PRIMARY INSOMNIA: ICD-10-CM

## 2023-10-20 DIAGNOSIS — Z79.899 LONG-TERM USE OF HIGH-RISK MEDICATION: ICD-10-CM

## 2023-10-20 DIAGNOSIS — Z13.0 SCREENING FOR IRON DEFICIENCY ANEMIA: ICD-10-CM

## 2023-10-20 DIAGNOSIS — Z12.5 SCREENING FOR PROSTATE CANCER: ICD-10-CM

## 2023-10-20 DIAGNOSIS — J44.9 CHRONIC OBSTRUCTIVE PULMONARY DISEASE, UNSPECIFIED COPD TYPE (HCC): ICD-10-CM

## 2023-10-20 DIAGNOSIS — Z13.6 SCREENING FOR CARDIOVASCULAR CONDITION: ICD-10-CM

## 2023-10-20 DIAGNOSIS — D69.6 THROMBOCYTOPENIA (HCC): ICD-10-CM

## 2023-10-20 DIAGNOSIS — E78.5 DYSLIPIDEMIA: ICD-10-CM

## 2023-10-20 DIAGNOSIS — N64.4 BREAST PAIN, LEFT: ICD-10-CM

## 2023-10-20 DIAGNOSIS — I10 ESSENTIAL HYPERTENSION: ICD-10-CM

## 2023-10-20 DIAGNOSIS — Z00.00 LABORATORY EXAMINATION ORDERED AS PART OF A ROUTINE GENERAL MEDICAL EXAMINATION: ICD-10-CM

## 2023-10-20 DIAGNOSIS — Z13.29 SCREENING FOR THYROID DISORDER: ICD-10-CM

## 2023-10-20 DIAGNOSIS — Z13.1 SCREENING FOR DIABETES MELLITUS: ICD-10-CM

## 2023-10-20 DIAGNOSIS — R73.9 HYPERGLYCEMIA: ICD-10-CM

## 2023-10-20 DIAGNOSIS — Z85.3 HISTORY OF RIGHT BREAST CANCER: ICD-10-CM

## 2023-10-20 DIAGNOSIS — E78.2 MIXED HYPERLIPIDEMIA: ICD-10-CM

## 2023-10-20 LAB
ALBUMIN SERPL-MCNC: 4 G/DL (ref 3.4–5)
ALBUMIN/GLOB SERPL: 1.3 {RATIO} (ref 1–2)
ALP LIVER SERPL-CCNC: 96 U/L
ALT SERPL-CCNC: 49 U/L
ANION GAP SERPL CALC-SCNC: 6 MMOL/L (ref 0–18)
AST SERPL-CCNC: 29 U/L (ref 15–37)
BASOPHILS # BLD AUTO: 0.03 X10(3) UL (ref 0–0.2)
BASOPHILS NFR BLD AUTO: 0.7 %
BILIRUB SERPL-MCNC: 2.3 MG/DL (ref 0.1–2)
BUN BLD-MCNC: 12 MG/DL (ref 7–18)
CALCIUM BLD-MCNC: 8.7 MG/DL (ref 8.5–10.1)
CHLORIDE SERPL-SCNC: 107 MMOL/L (ref 98–112)
CHOLEST SERPL-MCNC: 128 MG/DL (ref ?–200)
CO2 SERPL-SCNC: 27 MMOL/L (ref 21–32)
COMPLEXED PSA SERPL-MCNC: <0.01 NG/ML (ref ?–4)
CREAT BLD-MCNC: 1.11 MG/DL
EGFRCR SERPLBLD CKD-EPI 2021: 73 ML/MIN/1.73M2 (ref 60–?)
EOSINOPHIL # BLD AUTO: 0.18 X10(3) UL (ref 0–0.7)
EOSINOPHIL NFR BLD AUTO: 3.9 %
ERYTHROCYTE [DISTWIDTH] IN BLOOD BY AUTOMATED COUNT: 12.3 %
EST. AVERAGE GLUCOSE BLD GHB EST-MCNC: 114 MG/DL (ref 68–126)
FASTING PATIENT LIPID ANSWER: YES
FASTING STATUS PATIENT QL REPORTED: YES
GLOBULIN PLAS-MCNC: 3 G/DL (ref 2.8–4.4)
GLUCOSE BLD-MCNC: 92 MG/DL (ref 70–99)
HBA1C MFR BLD: 5.6 % (ref ?–5.7)
HCT VFR BLD AUTO: 46.8 %
HDLC SERPL-MCNC: 54 MG/DL (ref 40–59)
HGB BLD-MCNC: 16 G/DL
IMM GRANULOCYTES # BLD AUTO: 0.06 X10(3) UL (ref 0–1)
IMM GRANULOCYTES NFR BLD: 1.3 %
LDLC SERPL CALC-MCNC: 60 MG/DL (ref ?–100)
LYMPHOCYTES # BLD AUTO: 0.79 X10(3) UL (ref 1–4)
LYMPHOCYTES NFR BLD AUTO: 17.2 %
MCH RBC QN AUTO: 31.5 PG (ref 26–34)
MCHC RBC AUTO-ENTMCNC: 34.2 G/DL (ref 31–37)
MCV RBC AUTO: 92.1 FL
MONOCYTES # BLD AUTO: 0.64 X10(3) UL (ref 0.1–1)
MONOCYTES NFR BLD AUTO: 14 %
NEUTROPHILS # BLD AUTO: 2.88 X10 (3) UL (ref 1.5–7.7)
NEUTROPHILS # BLD AUTO: 2.88 X10(3) UL (ref 1.5–7.7)
NEUTROPHILS NFR BLD AUTO: 62.9 %
NONHDLC SERPL-MCNC: 74 MG/DL (ref ?–130)
OSMOLALITY SERPL CALC.SUM OF ELEC: 289 MOSM/KG (ref 275–295)
PLATELET # BLD AUTO: 152 10(3)UL (ref 150–450)
POTASSIUM SERPL-SCNC: 4 MMOL/L (ref 3.5–5.1)
PROT SERPL-MCNC: 7 G/DL (ref 6.4–8.2)
RBC # BLD AUTO: 5.08 X10(6)UL
SODIUM SERPL-SCNC: 140 MMOL/L (ref 136–145)
TRIGL SERPL-MCNC: 68 MG/DL (ref 30–149)
TSI SER-ACNC: 2.47 MIU/ML (ref 0.36–3.74)
VIT D+METAB SERPL-MCNC: 30.2 NG/ML (ref 30–100)
VLDLC SERPL CALC-MCNC: 10 MG/DL (ref 0–30)
WBC # BLD AUTO: 4.6 X10(3) UL (ref 4–11)

## 2023-10-20 PROCEDURE — 77061 BREAST TOMOSYNTHESIS UNI: CPT | Performed by: SURGERY

## 2023-10-20 PROCEDURE — 83036 HEMOGLOBIN GLYCOSYLATED A1C: CPT

## 2023-10-20 PROCEDURE — 77065 DX MAMMO INCL CAD UNI: CPT | Performed by: SURGERY

## 2023-10-20 PROCEDURE — 85025 COMPLETE CBC W/AUTO DIFF WBC: CPT

## 2023-10-20 PROCEDURE — 36415 COLL VENOUS BLD VENIPUNCTURE: CPT

## 2023-10-20 PROCEDURE — 80053 COMPREHEN METABOLIC PANEL: CPT

## 2023-10-20 PROCEDURE — 82306 VITAMIN D 25 HYDROXY: CPT

## 2023-10-20 PROCEDURE — 84443 ASSAY THYROID STIM HORMONE: CPT

## 2023-10-20 PROCEDURE — 80061 LIPID PANEL: CPT

## 2023-10-20 RX ORDER — ZOLPIDEM TARTRATE 12.5 MG/1
TABLET, FILM COATED, EXTENDED RELEASE ORAL
Qty: 90 TABLET | Refills: 1 | Status: SHIPPED | OUTPATIENT
Start: 2023-10-20

## 2023-10-20 RX ORDER — AZELASTINE 1 MG/ML
SPRAY, METERED NASAL
COMMUNITY
Start: 2023-09-29

## 2023-10-20 NOTE — TELEPHONE ENCOUNTER
Pt requesting refill on Zolpidem Tartrate ER 12.5 MG Oral Tab CR to be sent to preferred pharmacy:  [address], [address] [place] [address]  [phone] [phone], [phone]

## 2023-10-30 PROBLEM — I89.0 LYMPHEDEMA: Status: ACTIVE | Noted: 2023-06-21

## 2023-10-30 PROBLEM — I10 HYPERTENSION: Status: ACTIVE | Noted: 2023-06-20

## 2023-10-30 PROBLEM — I10 PRIMARY HYPERTENSION: Status: ACTIVE | Noted: 2023-06-20

## 2023-10-31 NOTE — ASSESSMENT & PLAN NOTE
Cholesterol shows Good control. Long term heart-healthy diet and lifestyle discussed and encouraged to reduce risk of cardiovascular disease. Cholesterol: 128, done on 10/20/2023. HDL Cholesterol: 54, done on 10/20/2023. TriGlycerides 68, done on 10/20/2023. LDL Cholesterol: 60, done on 10/20/2023. Cholesterol medications include atorvastatin 80 MG Oral Tab [[id number]].

## 2023-10-31 NOTE — ASSESSMENT & PLAN NOTE
Last Platelet value was 251 done 10/20/2023. Lowest level in the last 10 years was 119.  Stable, continue present management

## 2023-10-31 NOTE — ASSESSMENT & PLAN NOTE
BP shows poor control with last BP of 150/95. Lifestyle changes, diet, exercise and weight loss were counseled. Medication changes as listed. Last K was 4 done on 10/20/2023. Last Cr was 1.11 done on 10/20/2023.

## 2023-11-01 ENCOUNTER — OFFICE VISIT (OUTPATIENT)
Dept: FAMILY MEDICINE CLINIC | Facility: CLINIC | Age: 68
End: 2023-11-01
Payer: COMMERCIAL

## 2023-11-01 VITALS
BODY MASS INDEX: 31.34 KG/M2 | HEIGHT: 69 IN | HEART RATE: 74 BPM | DIASTOLIC BLOOD PRESSURE: 80 MMHG | RESPIRATION RATE: 16 BRPM | SYSTOLIC BLOOD PRESSURE: 132 MMHG | WEIGHT: 211.63 LBS

## 2023-11-01 DIAGNOSIS — L40.9 PSORIASIS: ICD-10-CM

## 2023-11-01 DIAGNOSIS — I10 PRIMARY HYPERTENSION: ICD-10-CM

## 2023-11-01 DIAGNOSIS — J44.9 CHRONIC OBSTRUCTIVE PULMONARY DISEASE, UNSPECIFIED COPD TYPE (HCC): ICD-10-CM

## 2023-11-01 DIAGNOSIS — Z00.00 ANNUAL PHYSICAL EXAM: Primary | ICD-10-CM

## 2023-11-01 DIAGNOSIS — D69.6 THROMBOCYTOPENIA (HCC): ICD-10-CM

## 2023-11-01 DIAGNOSIS — E78.2 MIXED HYPERLIPIDEMIA: ICD-10-CM

## 2023-11-01 DIAGNOSIS — F51.01 PRIMARY INSOMNIA: ICD-10-CM

## 2023-11-01 DIAGNOSIS — K76.0 NAFLD (NONALCOHOLIC FATTY LIVER DISEASE): ICD-10-CM

## 2023-11-01 PROCEDURE — 3075F SYST BP GE 130 - 139MM HG: CPT | Performed by: FAMILY MEDICINE

## 2023-11-01 PROCEDURE — 99397 PER PM REEVAL EST PAT 65+ YR: CPT | Performed by: FAMILY MEDICINE

## 2023-11-01 PROCEDURE — 3008F BODY MASS INDEX DOCD: CPT | Performed by: FAMILY MEDICINE

## 2023-11-01 PROCEDURE — 90679 RSV VACC PREF RECOMB ADJT IM: CPT | Performed by: FAMILY MEDICINE

## 2023-11-01 PROCEDURE — 3079F DIAST BP 80-89 MM HG: CPT | Performed by: FAMILY MEDICINE

## 2023-11-01 PROCEDURE — 90471 IMMUNIZATION ADMIN: CPT | Performed by: FAMILY MEDICINE

## 2023-11-01 RX ORDER — ZOLPIDEM TARTRATE 12.5 MG/1
TABLET, FILM COATED, EXTENDED RELEASE ORAL
Qty: 90 TABLET | Refills: 1 | Status: SHIPPED | OUTPATIENT
Start: 2023-11-01

## 2024-05-09 ENCOUNTER — TELEPHONE (OUTPATIENT)
Dept: FAMILY MEDICINE CLINIC | Facility: CLINIC | Age: 69
End: 2024-05-09

## 2024-05-09 ENCOUNTER — OFFICE VISIT (OUTPATIENT)
Dept: FAMILY MEDICINE CLINIC | Facility: CLINIC | Age: 69
End: 2024-05-09
Payer: COMMERCIAL

## 2024-05-09 VITALS
HEIGHT: 69 IN | DIASTOLIC BLOOD PRESSURE: 80 MMHG | HEART RATE: 84 BPM | BODY MASS INDEX: 30.81 KG/M2 | WEIGHT: 208 LBS | RESPIRATION RATE: 16 BRPM | SYSTOLIC BLOOD PRESSURE: 124 MMHG

## 2024-05-09 DIAGNOSIS — K76.0 NAFLD (NONALCOHOLIC FATTY LIVER DISEASE): ICD-10-CM

## 2024-05-09 DIAGNOSIS — J44.9 CHRONIC OBSTRUCTIVE PULMONARY DISEASE, UNSPECIFIED COPD TYPE (HCC): Primary | ICD-10-CM

## 2024-05-09 DIAGNOSIS — E78.2 MIXED HYPERLIPIDEMIA: ICD-10-CM

## 2024-05-09 DIAGNOSIS — M06.4 INFLAMMATORY POLYARTHRITIS (HCC): ICD-10-CM

## 2024-05-09 DIAGNOSIS — I10 PRIMARY HYPERTENSION: ICD-10-CM

## 2024-05-09 DIAGNOSIS — Z85.3 HISTORY OF BREAST CANCER IN MALE: ICD-10-CM

## 2024-05-09 DIAGNOSIS — Z85.46 HISTORY OF PROSTATE CANCER: ICD-10-CM

## 2024-05-09 DIAGNOSIS — Z85.820 HISTORY OF MALIGNANT MELANOMA OF SKIN: ICD-10-CM

## 2024-05-09 DIAGNOSIS — D80.4 SELECTIVE IGM DEFICIENCY (HCC): ICD-10-CM

## 2024-05-09 DIAGNOSIS — I47.10 SVT (SUPRAVENTRICULAR TACHYCARDIA) (HCC): ICD-10-CM

## 2024-05-09 DIAGNOSIS — D69.6 THROMBOCYTOPENIA (HCC): ICD-10-CM

## 2024-05-09 DIAGNOSIS — G47.33 OSA ON CPAP: ICD-10-CM

## 2024-05-09 DIAGNOSIS — F51.01 PRIMARY INSOMNIA: ICD-10-CM

## 2024-05-09 PROBLEM — N30.01 ACUTE CYSTITIS WITH HEMATURIA: Status: RESOLVED | Noted: 2023-01-19 | Resolved: 2024-05-09

## 2024-05-09 PROCEDURE — 3074F SYST BP LT 130 MM HG: CPT | Performed by: FAMILY MEDICINE

## 2024-05-09 PROCEDURE — 99214 OFFICE O/P EST MOD 30 MIN: CPT | Performed by: FAMILY MEDICINE

## 2024-05-09 PROCEDURE — 3008F BODY MASS INDEX DOCD: CPT | Performed by: FAMILY MEDICINE

## 2024-05-09 PROCEDURE — 3079F DIAST BP 80-89 MM HG: CPT | Performed by: FAMILY MEDICINE

## 2024-05-09 PROCEDURE — G2211 COMPLEX E/M VISIT ADD ON: HCPCS | Performed by: FAMILY MEDICINE

## 2024-05-09 RX ORDER — ZOLPIDEM TARTRATE 12.5 MG/1
TABLET, FILM COATED, EXTENDED RELEASE ORAL
Qty: 90 TABLET | Refills: 1 | Status: SHIPPED | OUTPATIENT
Start: 2024-05-09

## 2024-05-09 NOTE — PROGRESS NOTES
Gumaro is a 68 year old male coming in for had concerns including Medication Request (Follow up ).    Subjective:   HPI   Stable on baker cyst. Meniscal tear and microfracture of femur. On crutches. Doing   Stable insomnia on zolpidem  ibuprfen    Objective:   /80   Pulse 84   Resp 16   Ht 5' 9\" (1.753 m)   Wt 208 lb (94.3 kg)   BMI 30.72 kg/m²  Body mass index is 30.72 kg/m².   Physical Exam  Vitals and nursing note reviewed.   Constitutional:       General: He is not in acute distress.     Appearance: Normal appearance.   HENT:      Head: Normocephalic.   Cardiovascular:      Rate and Rhythm: Normal rate and regular rhythm.      Pulses:           Posterior tibial pulses are 2+ on the right side and 2+ on the left side.      Heart sounds: Normal heart sounds. No murmur heard.  Pulmonary:      Effort: Pulmonary effort is normal. No respiratory distress.      Breath sounds: Normal breath sounds. No wheezing.   Abdominal:      General: Bowel sounds are normal.      Palpations: Abdomen is soft.      Tenderness: There is no abdominal tenderness.   Musculoskeletal:      Cervical back: Normal range of motion.      Right lower leg: No edema.      Left lower leg: No edema.   Skin:     General: Skin is warm and dry.      Findings: No rash.   Neurological:      Mental Status: He is alert and oriented to person, place, and time.   Psychiatric:         Mood and Affect: Mood normal.         Behavior: Behavior normal.         Thought Content: Thought content normal.         Judgment: Judgment normal.           Assessment & Plan:   1. Chronic obstructive pulmonary disease, unspecified COPD type (HCC) (Primary)  Overview:  Airduo inhaler.  Assessment & Plan:  Stable, continue present management and continue to monitor for progression   2. SVT (supraventricular tachycardia) (HCC)  Overview:  Seen in ER 5/2023  Assessment & Plan:  Stable, continue present management and continue to monitor for progression   3. Primary  hypertension  Overview:  Metoprolol ER 25  Assessment & Plan:  Last K was 4 done on 10/20/2023.  Last Cr was 1.11 done on 10/20/2023.  Last eGFR was 73 on 10/20/2023.   4. Mixed hyperlipidemia  Overview:  Atorvastatin 40  Assessment & Plan:  Cholesterol shows Good control. Long term heart-healthy diet and lifestyle discussed and encouraged to reduce risk of cardiovascular disease.  Cholesterol: 128, done on 10/20/2023.  HDL Cholesterol: 54, done on 10/20/2023.  TriGlycerides 68, done on 10/20/2023.  LDL Cholesterol: 60, done on 10/20/2023.   Cholesterol medications include atorvastatin 80 MG Oral Tab [[id number]], atorvastatin 80 MG Oral Tab [[id number]] (Long-Term Med).     5. NAFLD (nonalcoholic fatty liver disease)  Overview:  Dr Louis managing, possible due to Tamoxifen  Assessment & Plan:  10/20/2023: ALT 49; AST 29 stable, continue present management and continue to monitor for progression   6. Primary insomnia  Overview:  Zolpidem ER 12.5, severe adveres reaction of activation on trazodone in past  Assessment & Plan:  Stable, continue present management and continue to monitor for progression   Orders:  -     Zolpidem Tartrate ER; TAKE ONE TABLET BY MOUTH NIGHTLY AS NEEDED FOR SLEEP  Dispense: 90 tablet; Refill: 1  7. PRICILA on CPAP  Overview:  4/2017 study, Ger 77 %, needs tx, AHI 35 supine, and 7 side    Assessment & Plan:  Stable, continue present management and continue to monitor for progression   8. Thrombocytopenia (HCC)  Overview:   since 2014, sees Dr Cedric escalante  Assessment & Plan:  Last Platelet value was 152 done 10/20/2023. Lowest level in the last 10 years was 119. Improving levels. Continue to monitor and continue present management   9. History of prostate cancer  Overview:  Prostate CA diagnosis 2014, davinci prostatectomy at [facility] 1/2015, mild incontinence since surgery  10. History of malignant melanoma of skin  Overview:  8/22 diagnosis of melanoma left lower leg.   Clayton/Rosa Maria managing  11. History of breast cancer in male  Overview:  Right central breast. Stabe 1b, DX 2015, On tamoxifen 20, Dr Steele managing  12. Inflammatory polyarthritis (HCC)  Assessment & Plan:  Stable per rheumatolgy. 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 Continue to monitor and continue present management      I have discontinued Mr. Gumaro Wray's Desonide, Fluticasone-Salmeterol, triamcinolone, Econazole Nitrate, and metoprolol succinate ER. I am also having him maintain his Vitamin D, One-Daily Multi Vitamins, atorvastatin, Sildenafil Citrate, pantoprazole, azelastine, Zoryve, mupirocin, and Zolpidem Tartrate ER.       No follow-ups on file.

## 2024-05-09 NOTE — ASSESSMENT & PLAN NOTE
Last Platelet value was 152 done 10/20/2023. Lowest level in the last 10 years was 119. Improving levels. Continue to monitor and continue present management

## 2024-05-09 NOTE — ASSESSMENT & PLAN NOTE
10/20/2023: ALT 49; AST 29 stable, continue present management and continue to monitor for progression

## 2024-05-09 NOTE — ASSESSMENT & PLAN NOTE
Cholesterol shows Good control. Long term heart-healthy diet and lifestyle discussed and encouraged to reduce risk of cardiovascular disease.  Cholesterol: 128, done on 10/20/2023.  HDL Cholesterol: 54, done on 10/20/2023.  TriGlycerides 68, done on 10/20/2023.  LDL Cholesterol: 60, done on 10/20/2023.   Cholesterol medications include atorvastatin 80 MG Oral Tab [[id number]], atorvastatin 80 MG Oral Tab [[id number]] (Long-Term Med).

## 2024-05-09 NOTE — ASSESSMENT & PLAN NOTE
Last K was 4 done on 10/20/2023.  Last Cr was 1.11 done on 10/20/2023.  Last eGFR was 73 on 10/20/2023.

## 2024-05-09 NOTE — TELEPHONE ENCOUNTER
Please enter lab orders for the patient's upcoming physical appointment.     Physical scheduled:   Your appointments       Date & Time Appointment Department ([place])    Nov 14, 2024 7:45 AM CST Physical - Established with Ramón Alex MD [facility] ([facility])              [facility]  [address]  [facility] [address]  [phone]           Preferred lab: [facility] ([facility])     The patient has been notified to complete fasting labs prior to their physical appointment.

## 2024-06-05 RX ORDER — SILDENAFIL 100 MG/1
100 TABLET, FILM COATED ORAL DAILY PRN
Qty: 30 TABLET | Refills: 1 | Status: SHIPPED | OUTPATIENT
Start: 2024-06-05

## 2024-06-05 NOTE — TELEPHONE ENCOUNTER
Refill request for:    Requested Prescriptions     Pending Prescriptions Disp Refills    SILDENAFIL CITRATE 100 MG Oral Tab [Pharmacy Med Name: Sildenafil Citrate Oral Tablet 100 MG] 30 tablet 0     Sig: TAKE 1 TABLET BY MOUTH EVERY DAY AS NEEDED        Last Prescribed Quantity Refills   4/6/23 30 3     LOV 5/9/2024     Patient was asked to follow-up in: Follow-up not documented in note    Appointment scheduled: 11/14/2024 Ramón Alex MD    Medication not on protocol.     # 30 with 3 refills routed to Ramón Alex MD for review

## 2024-08-07 ENCOUNTER — OFFICE VISIT (OUTPATIENT)
Dept: RHEUMATOLOGY | Facility: CLINIC | Age: 69
End: 2024-08-07
Payer: COMMERCIAL

## 2024-08-07 VITALS
WEIGHT: 210.81 LBS | RESPIRATION RATE: 16 BRPM | HEIGHT: 69 IN | SYSTOLIC BLOOD PRESSURE: 150 MMHG | HEART RATE: 65 BPM | BODY MASS INDEX: 31.22 KG/M2 | OXYGEN SATURATION: 96 % | TEMPERATURE: 98 F | DIASTOLIC BLOOD PRESSURE: 80 MMHG

## 2024-08-07 DIAGNOSIS — Z13.820 SCREENING FOR OSTEOPOROSIS: ICD-10-CM

## 2024-08-07 DIAGNOSIS — E55.9 VITAMIN D DEFICIENCY: ICD-10-CM

## 2024-08-07 DIAGNOSIS — M84.352S STRESS FRACTURE OF LEFT FEMUR, SEQUELA: ICD-10-CM

## 2024-08-07 DIAGNOSIS — M19.90 INFLAMMATORY ARTHRITIS: Primary | ICD-10-CM

## 2024-08-07 DIAGNOSIS — M25.50 POLYARTHRALGIA: ICD-10-CM

## 2024-08-07 PROCEDURE — 3077F SYST BP >= 140 MM HG: CPT | Performed by: INTERNAL MEDICINE

## 2024-08-07 PROCEDURE — 99215 OFFICE O/P EST HI 40 MIN: CPT | Performed by: INTERNAL MEDICINE

## 2024-08-07 PROCEDURE — 3008F BODY MASS INDEX DOCD: CPT | Performed by: INTERNAL MEDICINE

## 2024-08-07 PROCEDURE — 3079F DIAST BP 80-89 MM HG: CPT | Performed by: INTERNAL MEDICINE

## 2024-08-07 RX ORDER — MELATONIN
COMMUNITY
Start: 2024-07-31

## 2024-08-07 NOTE — PROGRESS NOTES
RHEUMATOLOGY FOLLOW UP   Date of visit: 08/07/2024  ?  Chief Complaint   Patient presents with    Follow - Up     LOV 4/7/2023 bilateral wrist pain 3/10 and bilateral ankle pain 7/10 in the A.M. and 3/10 as the pain subsides. Rapid 3 score is a 2.3.       ASSESSMENT, DISCUSSION & PLAN   Assessment:  1. Inflammatory arthritis    2. Screening for osteoporosis    3. Stress fracture of left femur, sequela    4. Polyarthralgia    5. Vitamin D deficiency        Discussion:  Mr. Cesar Wray is a 67 yo man with a complicated history of prostate cancer, breast cancer as well as multiple skin cancers who presented for evaluation due to polyarticular joint pain and swelling. States symptoms started in July/August 2020. He had a complicated course of knee aspiration showing staph aureus, however cell counts not consistent with septic arthritis. Was placed on antibiotics. Since that time, he has had worsened joint pain in his wrists and elbows with pain/swelling there. He has chronic pain in his feet/ankles from previous surgeries. He did follow up with ID and told joint pain unlikely infectious in nature.   At his initial visit, I worried about a non-infectious inflammatory arthropathy. He may have a reactive arthritis linked to his frequent UTIs vs a seronegative spondyloarthropathy vs RA like disease. Another consideration is possible underlying malignancy causing an inflammatory arthropathy.   His autoimmune workup was negative including inflammatory markers.  Since his initial visit, he underwent right rotator cuff surgery. Then, he had worsened bilateral wrist and hand pain. He reports swelling of the left wrist, underwent MRI imaging which did not show any signs of inflammation but more degenerative changes. He is following with ortho for that and knees.     His course of action is very complicated. He already has borderline renal function as well as hx of fatty liver for which he is following with Dr. Louis.  Had previously recommended if DMARD therapy initiated, to try sulfasalazine instead of methotrexate. He took 6 tabs once weekly without significant relief. He stopped methotrexate and nabumetone and noted worsened symptoms overall. Labs still show the leukopenia and thrombocytopenia unchanged compared to pre-methotrexate and while on methotrexate. 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   He tried sulfasalazine without improvement of symptoms. Even tried increasing to 1500mg BID with dizziness and other side effects. Despite the increase dosing for a month, he didn't have any improvement of symptoms.  He was dx with melanoma and required radical dissection. After this, he had had worsened lymphedema of his left leg and abdomen. Seems to have triggered a reactive arthritis flare again  He was given course of prednisone after last visit without significant improvement of symptoms.  At his last visit in April of 2022, recommended trial of colchicine, however unclear if pt took since he had other complications including SVT and stress fracture.     At this time, he continues with diffuse joint pain but primarily the left wrist with some trace swelling. Due to lack of serologies or imaging to support inflammatory arthritis and his lack of response to therapies (NSAIDs, methotrexate, sulfasalazine and more recently prednisone). It is hard to justify further immunosuppression given his significant cancer history and risk.   Will have him get updated labs again.  Also, needs bone density given recent stress fracture without obvious trigger.   Reviewed chart extensively - derm wise- no documentation of psoriasis but actinic keratoses and other papules- BCC vs SCC s/p bx confirming AK and not cancer.   Depending on his updated labs, will consider retrial of colchicine vs trial of otezla.   Can consider a different low dose antidepressant for pain    Follow up in  another 4-5 months or sooner as needed.     Patient verbalized understanding of above instructions. No further questions at this time.    Code selection for this visit was based on time spent (42min) on date of service in preparing to see the patient, obtaining and/or reviewing separately obtained history, performing a medically appropriate examination, counseling and educating the patient/family/caregiver, ordering medications or testing, referring and communicating with other healthcare providers, documenting clinical information in the E HR, independently interpreting results and communicating results to the patient/family/caregiver and care coordination with the patient's other providers.      ?  Plan:  Diagnoses and all orders for this visit:    Inflammatory arthritis  -     XR DEXA BONE DENSITOMETRY (CPT=77080); Future  -     PTH Intact with minerals; Future  -     Vitamin D; Future  -     Magnesium; Future  -     TSH and Free T4 [E]; Future  -     CBC With Differential With Platelet; Future  -     Comp Metabolic Panel (14); Future  -     C-Reactive Protein; Future  -     Sed Rate, Westergren (Automated); Future    Screening for osteoporosis  -     XR DEXA BONE DENSITOMETRY (CPT=77080); Future  -     PTH Intact with minerals; Future  -     Vitamin D; Future  -     Magnesium; Future  -     TSH and Free T4 [E]; Future    Stress fracture of left femur, sequela  -     XR DEXA BONE DENSITOMETRY (CPT=77080); Future  -     PTH Intact with minerals; Future  -     Magnesium; Future  -     TSH and Free T4 [E]; Future    Polyarthralgia  -     PTH Intact with minerals; Future  -     Vitamin D; Future  -     Magnesium; Future  -     TSH and Free T4 [E]; Future  -     CBC With Differential With Platelet; Future  -     Comp Metabolic Panel (14); Future  -     C-Reactive Protein; Future  -     Sed Rate, Westergren (Automated); Future    Vitamin D deficiency  -     Vitamin D; Future          No follow-ups on file.  ?  HPI    Cesar Wray is a 68 year old male with the following active problems who was seen as a new patient a few months ago for polyarthralgia. He had multiple areas of joint pain. He was also admitted to the hospital previously for septic arthritis. He presents for follow up today.      Since his last visit, he feels about the same.   Not sure if he took colchicine last year (lost to follow up since April of 2023 ;tried to call in Feb but this was the earliest apt)  In May of last year, had an episode of SVT- HR as high as 210. Went to hospital. Was given adenosine and HR came down to 100s. Never started metoprolol since has not had recurrence of symptoms (except up to 130).   Seen cardiology and no further work up done.     Had some worsened right knee pain. Sustained a stress fracture and had to wear crutches. Still not healing. Is following with orthopedics and considering surgery since still not healing. Also torn mensicus.   No obvious trauma/injury.     Still having wrist and ankle pain  Feels pain when getting out of bed in the morning, gets better as day progresses  Wrists hurt constantly  Can have urinary incontinence due to pain.   Occasional left elbow pain/swelling.     Some fluid retention in the legs.   Denies overt flares of pain/swelling/redness.   Denies new nodule formation.     + rash on left foot/leg- dermatology, now using Zoryve (PDE4 inhibitor). Rash has improved. No scarring. Not sure if psoriasis vs dermatitis     Had EGD/cscope done last year- a few polyps and small hiatal hernia. Put on pantoprazole BID. Prior chest discomfort has gotten better. Also stopped with mucous production. Also added nasal spray.  Underwent sinus surgery in Nov (fixed deviated septum). No longer have the sinus pain. No recent abx- no blood or dark colored mucous  Cough less overall   + one UTI in early Nov     Reminds me he did not go through any chemotherapy for either prostate or breast cancer. He was on  tamoxifen until earlier this year (s/p 5 years of therapy). He did have knee septic arthritis previously which was the start of his problems.   Some basal cell carcinoma.     Previously tried (none provided sustained relief)   Sulfasalazine 1500mg BID  Methotrexate  Colchicine  Prednisone   Amitriptyline- didn't like how it made him feel- loopy     HPI from initial consultation  referred for rheumatologic evaluation due to joint pain.     States he developed bilateral foot pain in July/August. Did have pain without swelling. Does have hx of reconstructive surgery in both ankles and both feet. States the morning he went to see his PCP, he had left knee pain and swelling and felt warmer (checked temperature). Followed with one orthopedic surgeon. Had fluid aspirated and told to check in to ER. Was in hospital at the end of August, given IV antibiotics and discharged on oral antibiotics for 21 days. Then also on Bactrim for sometime. States aspiration was negative for crystals.   Then developed bilateral wrist pain and swelling about one month ago. Also has bilateral elbow swelling.   Followed with Dr. Rutherford who did not think related to septic.   Currently knees are uncomfortable.  Also is currently treated with antibiotics currently for a UTI or kidney infection.   Was given prednisone and colchicine prior to hospitalization and was on for about 1 week without improvement of symptoms.     Hx of prostate cancer s/p prostatectomy (no radiation or chemo) - Jan 2015  Hx of breast cancer s/p mastectomy; on tamoxifen now  (no radiation or chemo) - Dec 2015  Hx of multiple skin cancers (both squamous and basal)  Hx of PRICILA on CPAP    + morning stiffness lasts throughout the day, but worst for 5 minutes, improved minimally with activity   + right supraspinatus and infraspinatus repair   + left big toe pain chronic since fusion years ago   + hair loss/thinning thought normal age related   + hx of rashes but stopped after  prostate surgery, no recurrence since that time.   + hx of borderline thrombocytopenia   + hx of fatty liver disease  + hx of renal stones   + tightness in the calves  + intermittent lymphadenopathy around the angle of the jaw and left armpit; regardless of feeling sick.   + night sweats thought related to tamoxifen   + admits to slow healing of minor wounds   + gets blood in urine typically when has UTI    The patient denies oral or nasal ulcers, photosensitive rash, Raynaud's phenomenon, or history of seizures. Denies hx of pericarditis or pleuritis.   No history of prior blood clot in the legs or lungs, strokes or ischemic phenomenon.  Denies nonhealing ulcers on the fingertips, trouble swallowing, or severe acid reflux.  The patient denies any history of uveitis, crampy abdominal pain,  constipation, diarrhea, bloody stools, nodular painful shin bruises, Achilles heel pain, spooning or pitting of the nails, or history of dactylitis..  There are no symptoms of severe dry eyes, dry mouth, or swelling of the cheeks or under the jawbone.   No fevers, chills, unexpected weight loss, easy bruising or bleeding, or unexplained weakness.  Denies epistaxis.  Denies chronic cough or hemoptysis.     Family hx:  Multiple family members with gout- father, older brother         Past Medical History:  Past Medical History:    Actinic keratosis    left wrist    Actinic keratosis    right forearm    Actinic keratosis    left neck    Actinic keratosis    left forearm    Anesthesia complication    HARD TO AWAKEN AFTER PROSTECTOMY    Arthritis    Atelectasis of both lungs    Basal cell carcinoma    left upper arm    Basal cell carcinoma    right shoulder; left forearm    Basal cell carcinoma    right forehead    Breast cancer (HCC)    right breast     Calculus of kidney    left lower lobe     Esophageal reflux    High blood pressure    High cholesterol    Hyperlipidemia    Incontinence    bladder     Lymphedema of arm    right;  wears sleeve; NO BP/IV to right arm     Malignant melanoma (HCC)    left calf    FRIEDMAN (nonalcoholic steatohepatitis)    Dr Louis ; from tamoxifen - stable    Osteoarthritis    Personal history of arthritis    Prostate cancer (HCC)    [facility]    Sleep apnea    CPAP    Squamous cell carcinoma    in-situ, mid upper forehead    Squamous cell carcinoma    in-situ, mid upper chest    Squamous cell carcinoma    in-situ, left upper arm; in-situ, left forearm    Squamous cell carcinoma    in-situ, right posterior neck; in-situ, right upper arm    Visual impairment    glasses     Past Surgical History:  Past Surgical History:   Procedure Laterality Date    Colonoscopy N/A 12/05/2016    Procedure: COLONOSCOPY;  Surgeon: Cole Storey MD;  Location:  ENDOSCOPY    Colonoscopy  12/15/2018    Colonoscopy & polypectomy  12/05/2016    small polyp at prior polypectomy site     Foot/toes surgery proc unlisted  12/2012    Tarsil fusion, 8 screws and 21 plate    Hand/finger surgery unlisted      surgery of the thumb    Laparoscopy, surgical prostatectomy, retropubic radical, w/nerve sparing  01/2015    Leg/ankle surgery proc unlisted  1978 1989    first left then right    Mastectomy right  12/15/2015    SENTINEL AND AXILLARY LYMPH NODE REMOVAL, Do not use right arm for IV/BP    Other surgical history  01/2015    radical prostatectomy    Other surgical history Left 09/2022    melanoma removal calf    Other surgical history Left 10/2022    melanoma removal calf    Repair of biceps tendon at elbow  6/20012    Dr Reyes at [facility]    Repair rotator cuff,chronic      Shoulder surg proc unlisted Bilateral 2004/2012    right shoulder/left    Skin surgery Right 06/2022    shoulder skin cancer removal    Tonsillectomy       Family History:  Family History   Problem Relation Age of Onset    Breast Cancer Sister 50        uterine ca after tamoxifen tx (dx 51)    Cancer Sister 51        uterine ca (due to tamoxifen)    Other (Benign  breast biopsy) Sister 50    Gastro-Intestinal Disorder Father         Ulcers    Diabetes Father     Neurological Disorder Father         Parkinson's Disease    Other (Other) Father     Heart Disease Mother     Breast Cancer Mother 75        d.82    Other (Benign breast biopsy) Sister 45    Other (Benign breast biopsy) Sister     Breast Cancer Paternal Aunt         d.92    Cancer Maternal Uncle         brain ca; d.40's    Other (Gout) Brother     Cancer Self 59        prostate    Breast Cancer Self 60        invasive ductal ca     Social History:  Social History     Socioeconomic History    Marital status:     Number of children: 3   Occupational History    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   Tobacco Use    Smoking status: Never    Smokeless tobacco: Never   Vaping Use    Vaping status: Never Used   Substance and Sexual Activity    Alcohol use: Not Currently    Drug use: No   Other Topics Concern    Caffeine Concern Yes     Comment: coffee once a week 1 soda a day    Sleep Concern No    Exercise No    Seat Belt Yes     Medications:  Outpatient Medications Marked as Taking for the 8/7/24 encounter (Office Visit) with Ruba Richards DO   Medication Sig Dispense Refill    ascorbic acid 100 MG Oral Tab Vitamin C 100 mg tablet, [RxNorm: 151846]      cyanocobalamin 1000 MCG Oral Tab cyanocobalamin (vit B-12) 1,000 mcg tablet, [RxNorm: 144483]      SILDENAFIL CITRATE 100 MG Oral Tab TAKE 1 TABLET BY MOUTH EVERY DAY AS NEEDED 30 tablet 1    Zolpidem Tartrate ER 12.5 MG Oral Tab CR TAKE ONE TABLET BY MOUTH NIGHTLY AS NEEDED FOR SLEEP 90 tablet 1    Roflumilast (ZORYVE) 0.3 % External Cream Apply 1 Application topically daily. 60 g 3    azelastine 0.1 % Nasal Solution INHALE 2 SPRAYS NASALLY 2 TIMES DAILY      pantoprazole 40 MG Oral Tab EC Take 1 tablet (40 mg total) by mouth 2 (two) times daily.      atorvastatin 80 MG Oral Tab Take 1 tablet (80 mg total) by mouth daily. 90 tablet 3     Multiple Vitamin (ONE-DAILY MULTI VITAMINS) Oral Tab Take 1 tablet by mouth daily.      Cholecalciferol (VITAMIN D) 50 MCG (2000 UT) Oral Cap Take by mouth.       Modified Medications    No medications on file     Medications Discontinued During This Encounter   Medication Reason    mupirocin 2 % External Ointment      ?  ?  Allergies:  Allergies   Allergen Reactions    Levaquin [Levofloxacin] SWELLING     Other reaction(s): Other  Insomnia; swelling      Hibiclens RASH     ?  REVIEW OF SYSTEMS   ?  Review of Systems   Constitutional:  Positive for malaise/fatigue. Negative for chills, fever and weight loss.   HENT:  Positive for tinnitus. Negative for congestion and hearing loss.         + bleeding in mouth   Eyes:  Negative for pain and redness.   Respiratory:  Positive for cough. Negative for hemoptysis and shortness of breath.    Cardiovascular:  Positive for leg swelling. Negative for chest pain.   Gastrointestinal:  Negative for abdominal pain, blood in stool, constipation, diarrhea, heartburn and nausea.   Genitourinary:  Positive for frequency. Negative for dysuria, hematuria and urgency.   Musculoskeletal:  Positive for joint pain and neck pain. Negative for back pain and myalgias.        Ankles, knees, wrists   Skin:  Positive for rash. Negative for itching.   Neurological:  Negative for dizziness, tingling, seizures, weakness and headaches.        + muscle twitching   Endo/Heme/Allergies:  Bruises/bleeds easily.   Psychiatric/Behavioral:  Negative for depression. The patient has insomnia. The patient is not nervous/anxious.         micah Kinney     PHYSICAL EXAM   Today's Vitals:  Temperature Blood Pressure Heart Rate Resp Rate SpO2   Temp: 98.1 °F (36.7 °C) BP: 150/80 Pulse: 65 Resp: 16 SpO2: 96 %   ?  Current Weight Height BMI BSA Pain   Wt Readings from Last 1 Encounters:   08/07/24 210 lb 12.8 oz (95.6 kg)    Height: 5' 9\" (175.3 cm) Body mass index is 31.13 kg/m². Body surface area is 2.11 meters  squared.         Physical Exam  Vitals and nursing note reviewed.   Constitutional:       General: He is not in acute distress.     Appearance: Normal appearance. He is well-developed. He is not diaphoretic.   HENT:      Head: Normocephalic.   Eyes:      General: No scleral icterus.     Extraocular Movements: Extraocular movements intact.      Conjunctiva/sclera: Conjunctivae normal.   Neck:      Vascular: No JVD.      Trachea: No tracheal deviation.   Cardiovascular:      Rate and Rhythm: Normal rate and regular rhythm.      Heart sounds: Normal heart sounds. No murmur heard.  Pulmonary:      Effort: Pulmonary effort is normal. No respiratory distress.      Breath sounds: Normal breath sounds. No wheezing.   Musculoskeletal:         General: Swelling, tenderness and deformity present.      Cervical back: Neck supple.      Comments: Scattered small heberden and kyung nodes of the fingers, no basilar joint tenderness of the 1st CMC bilaterally but slight squaring present.   + slight swelling and tenderness b/l wrists- left more swollen;restriction in extension - improved   No swelling, tenderness, redness or restriction of motion of the DIPs, PIPs, MCPs.  Left elbow tender; swelling over medial aspect   Knees, ankles and bones of feet benign on exam but ankles quite tender- left along medial malleolus   Lymphedema of left leg   Lymphadenopathy:      Cervical: No cervical adenopathy.   Skin:     General: Skin is warm and dry.      Findings: No erythema or rash.      Comments: Multiple hyperpigmented spots  No periungal erythema  No nail pitting  Right arm in compression sleeve d/t lymphedema   Neurological:      Mental Status: He is alert and oriented to person, place, and time.      Cranial Nerves: No cranial nerve deficit.   Psychiatric:         Mood and Affect: Mood normal.         Behavior: Behavior normal.       ?  Radiology review:       Formatting of this note might be different from the  original.  IMPRESSIONS:  1.  Subtle minimally depressed cortical fracture medial femoral condyle with developing underlying cyst formation and moderate edema, the degree of edema has decreased since the prior examination but the subtle cortical fracture is new  2.  Horizontal tearing posterior horn and body segment medial meniscus with developing flap of meniscal tissue caudal to the joint space at the posterior medial corner, similar to the prior examination  3.  Patellofemoral chondral thinning and fissuring without subchondral edema  4.  Joint effusion and Baker's cyst    CLINICAL INDICATION: Knee pain    EXAMINATION: MRI left knee    Comparison: MRI 3/12/2024    FINDINGS:   Medial compartment: There is a horizontal tear of the posterior horn of the medial meniscus extending to the body segment and a developing flap of meniscus tissue beginning to extend caudal to the joint line at the posterior medial corner.  This tear is   similar to what was seen on the prior examination.  There is a small cortical fracture of the medial femoral condylar cortical surface at the site of the nondepressed trabecular fracture seen on the prior examination with very minimal, less than 1 mm  depression.  There is some underlying fluid signal which could developing subchondral cyst.  Moderate edema is seen throughout the remainder of the medial femoral condyle, the degree of edema has improved since the prior examination.  Medial tibial  plateau articular cartilage and subchondral bone is intact.  Medial collateral ligament is intact    Lateral compartment: Lateral meniscus is not torn.  No chondral defect or subchondral edema are present laterally.  Lateral collateral ligament and popliteus tendons are intact    Intercondylar notch: Anterior and posterior cruciate ligaments are intact    Patellofemoral articulation: Distal quadriceps tendon and patellar tendons are intact.  Chondral thinning and fissuring is present at the  patellofemoral articulation without subchondral edema.  A moderate joint effusion is present with a small Baker's  cyst.  Electronically signed by GISELLE ZHOU  Exam End: 06/26/24  7:09 PM       Exam: MRI WRIST LT W/O CONTRAST   CPT Code(s): 56388 - MRI JOINT UPR EXTREM W/O DYE     CLINICAL HISTORY: Inflammatory arthritis (M19.90). Left wrist pain (M25.532).     COMPARISON: Left wrist MRI, 6/25/2021, from [facility].     TECHNIQUE: Left wrist MRI without contrast. Exam performed on an ultra high field 3.0 Yanelis MR scanner.     FINDINGS: Multiple subchondral cysts are identified in the distal radius underlying the radiocarpal joint measuring 1.7 cm and smaller. Mild subchondral bone marrow edema is also present in the distal radius underlying the radiocarpal joint. Osteophytes   in the dorsal margin of the distal radius. Severe articular cartilage thinning at the radiocarpal joint. Severe articular cartilage thinning at the distal radioulnar joint with osteophytes at the margins of the margin of the distal ulna. The ulnar   styloid is blunted, and there is mild bone marrow edema in the distal ulna. Small joint effusion and synovitis at the distal radioulnar joint, in addition to a 1.0 cm corticated ossific loose body in the volar aspect of the distal radioulnar joint.   Subcentimeter subchondral cysts versus erosions and mild subchondral bone marrow edema in the scaphoid and lunate. Subcentimeter subchondral cysts in the distal capitate and in the second and third metacarpal bases underlying the carpometacarpal joints,   in addition to mild subchondral bone marrow edema in the second metacarpal base underlying the second carpometacarpal joint. Osteophytes and moderate joint space narrowing at the first carpometacarpal joint. A subcentimeter corticated intra-articular   loose body is identified along the ulnar margin of the first carpometacarpal joint. Small midcarpal joint effusion. No fracture or dislocation are  identified.     The distal ulna abuts the proximal surface of the lunate. Extensive full-thickness tearing/maceration of the triangular fibrocartilage.     The scapholunate interval is mildly widened measuring 4 mm. There is mild ill-defined increased proton density signal in the scapholunate ligament, most pronounced in the membranous band, although no discrete fluid signal tear is identified in the   scapholunate ligament. The lunotriquetral ligament appears grossly intact, and the lunotriquetral interval is normal.     Subcentimeter ganglion cysts along the course of the radioscaphocapitate and long radiolunate ligaments near the radial attachment. Subcentimeter ganglion cysts along the volar margin of the capitate-hamate articulation, third carpometacarpal joint, and   triquetral-hamate articulation.     Mild to moderate tendinosis of the extensor carpi ulnaris tendon. Small amount of fluid in the extensor carpi ulnaris tendon sheath. The remainder of the left wrist extensor tendons and the left wrist flexor tendons are intact and normal in caliber. The   mild ill-defined increased proton density signal in the tendons in extensor compartments 3 and 4 is less conspicuous on the axial T2-weighted sequence and is likely due to \"magic angle\" effect rather than mild tendinosis.     Neurovascular structures are unremarkable.     IMPRESSION:   1. Large subchondral cysts in the distal radius and mild subchondral bone marrow edema underlying the radiocarpal joint. Severe articular cartilage thinning at the radiocarpal joint and the distal radioulnar joint. Blunted ulnar styloid which appears   eroded. Subchondral cysts versus erosions and mild subchondral bone marrow edema in the scaphoid and lunate. Subchondral cysts at the second and third carpometacarpal joints. Osteophytes at the distal radioulnar joint, radiocarpal joint, and first   carpometacarpal joint. Small joint effusion and synovitis at the distal radioulnar  joint, corticated ossific loose bodies in the distal radioulnar joint and first carpometacarpal joint, and small midcarpal joint effusion. 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 Similar findings were noted on the prior MRI, although the previously seen extraosseous   ganglion cyst at the volar margin of the distal radius is no longer present.     2. Extensive full-thickness tearing/maceration of the triangular fibrocartilage.     3. Mildly widened scapholunate interval with ill-defined increased proton density signal compatible with degeneration of the scapholunate ligament, although no discrete fluid signal tear is identified in the scapholunate ligament. This is similar to the   prior MRI.     4. Mild to moderate tendinosis of the extensor carpi ulnaris tendon, and mild extensor carpi ulnaris tenosynovitis.     5. Subcentimeter ganglion cysts along the course of the radioscaphocapitate and long radiolunate ligaments near the radial attachment may reflect the sequela of sprains of the volar extrinsic carpal ligaments.     Interpreting Radiologist:     Emerson Fish M.D.   Electronically Signed: 04/20/2023 09:47 AM   PROCEDURE:  MRI WRIST (W+WO), LEFT (CPT=73223)       COMPARISON:  None.       INDICATIONS:  M67.432 Ganglion cyst of wrist, left M25.832 Mass of joint of left wrist       TECHNIQUE:  A complete multi-planar MRI of the wrist was performed with and without intravenous gadolinium contrast.         PATIENT STATED HISTORY:(As transcribed by Technologist)  Patient has a cyst in his left wrist and complains of wrist pain        CONTRAST USED:  20 mL of Dotarem       FINDINGS:     FERMIN COMPARTMENTS:  There is extensive subchondral cyst formation throughout the radius and also in the scaphoid bone with extensive subchondral reactive edema.  There is a marker  placed over the radial styloid.  There is extension of subchondral cysts   into the soft tissues anterior to the distal radius consistent with a ganglion cyst.  Soft tissue component of the ganglion cyst measures maximum dimensions of 1.3 x 1 x 1.2 cm.  There is no fracture.  There is mild subchondral reactive edema in the   lunate bone in addition to the scaphoid with mild subchondral cyst formation also evident in the volar aspect of the lunate bone near articulation with the capitate.     MUSCULATURE:  No strain, edema, or atrophy.  No abnormal enhancement.   INTERCARPAL LIGAMENTS:  There is degeneration of the scapholunate ligament without full-thickness tear.  The lunotriquetral ligaments are intact.   EXTRINSIC LIGAMENTS:  Radioulnar and radiocarpal ligaments are within normal limits.     TFCC:  There is diffuse degeneration of the triangular fibrocartilage.  There is no typical full-thickness tear.   TENDONS:  Flexor and extensor tendons are unremarkable.   CARPAL TUNNEL:  Normal appearance of the median nerve.  No mass lesions.  No evidence to suggest Carpal Tunnel Syndrome.  No abnormal enhancement.   GUYON'S CANAL:  Normal appearance of the ulnar nerve.  No mass lesions.  No abnormal enhancement.                        Impression   CONCLUSION:     1. There is extensive subchondral cyst formation in the distal radius and in the scaphoid bone as well as the lunate.  There is extension of ganglion cyst anterior to the distal radius.  There is no solid mass detected.   2. There is degeneration of the scapholunate ligament and the triangular fibrocartilage without full-thickness tear.           Dictated by (CST): Emerson Strauss MD on 6/27/2021 at 10:01 PM       Finalized by (CST): Emerson Strauss MD on 6/27/2021 at 10:06 PM            PROCEDURE:  XR FOOT WEIGHTBEARING (3 VIEWS), RIGHT (CPT=73630)     INDICATIONS:  M25.50 Pain in unspecified joint     COMPARISON:  None.     PATIENT STATED HISTORY: (As transcribed  by Technologist)  Bilateral ankle and foot pain, no recent injury.         FINDINGS:    A bony fracture is not seen.  Postsurgical changes of fixation involving the midfoot.  There is a fracture involving the lateral most fusion screws.  Correlation with any other prior imaging is suggested.          =====  CONCLUSION:  Fracture of a metallic fixation device along the lateral aspect of midfoot.  Correlation with any other prior imaging is suggested.  If there is concern for acute fracture then consider follow-up imaging with CT.        Dictated by (CST): Sarath Patel MD on 8/07/2020 at 6:06 PM         PROCEDURE:  XR FOOT WEIGHTBEARING (3 VIEWS), LEFT (CPT=73630)     INDICATIONS:  M25.50 Pain in unspecified joint     COMPARISON:  None.     PATIENT STATED HISTORY: (As transcribed by Technologist)  Bilateral ankle and foot pain, no recent injury.         FINDINGS:    Postsurgical changes noted of the midfoot.  Bony fusion is noted.  No bony fracture is seen.  Mild osteopenia is present.  Prominent osteophyte formation noted over the midfoot.  There is a fracture of a fusion screw anteriorly which is best appreciated   on the lateral view.          =====  CONCLUSION:  Fracture of a fusion screw anteriorly within the midfoot.  This is age indeterminate.  Correlation with any other prior images is suggested.     Moderate degenerative changes with osteophyte formation seen at the talonavicular joint.     Dictated by (CST): Sarath Patel MD on 8/07/2020 at 6:13 PM      PROCEDURE:  XR KNEE ROUTINE (3 VIEWS), LEFT (CPT=73562)     TECHNIQUE:  Three views were obtained including patellar view.     COMPARISON:  None.     INDICATIONS:  Pain     PATIENT STATED HISTORY: (As transcribed by Technologist)  Left knee pain, no injury.          FINDINGS:    No joint effusion is seen.  No acute fracture or dislocation is seen.  Normal bone mineral density.  Normal alignment.          =====  CONCLUSION:  No acute fracture or dislocation is  seen.  If clinical symptoms persist recommend followup radiographs or MRI.    Dictated by (CST): Sarath Patel MD on 8/07/2020 at 6:05 PM      PROCEDURE:  MRI SPINE LUMBAR (CPT=72148)     COMPARISON:  [facility] CT ABDOMEN+PELVIS KIDNEYSTONE 2D RNDR(NO IV,NO ORAL)(CPT=74176), 12/09/2018, 0:33.     INDICATIONS:  M54.5 Low back pain.  1 episode of loss of bowel control.  Low back pain.     TECHNIQUE:  Multiplanar T1 and T2 weighted images including fat suppression sequences.  Images acquired in sagittal and axial planes.       PATIENT STATED HISTORY: (As transcribed by Technologist)  Patient has low back pain that radiates down the left buttocks for one month with no injury.         FINDINGS:       There are degenerative disc changes with loss of T2 disc signal, as well as facet changes present in the lumbar spine, of varying degrees at different levels, described individually below.     DECRIPTION OF INDIVIDUAL DISC LEVELS     Partially visualized lower thoracic level, and thoracolumbar junction:     T10-11 shows small disc bulging.  T11-12 shows greater disc bulging measuring 5 mm AP dimension, causing focal effacement of the thecal sac but no sign of compression of the lower thoracic spinal cord.  These thoracic levels are only visualized on the   sagittal images including the lower thoracic, not visualize on axial images.  The lower visible thoracic spinal cord and conus medullaris show no compression or signal abnormality peer     Lumbar and lumbosacral:     L1-L2:  Mild disc bulging in disc narrowing.  Central canal and neuroforamina patent, no protrusion.     L2-L3:  Normal     L3-L4:  Mild disc bulging and facet arthropathy.  No stenosis of the central canal or neural foramina.     L4-L5:  Greater disc degeneration at this level with loss of disc height, and moderate disc bulging.  The disc bulging is asymmetric slightly greater to the right but no focal protrusion.  Mild bilateral neural foraminal stenosis from  combination of disc   bulging encroaching upon the neural foramen and mild facet arthropathy at this level as well.  No high-grade stenosis however.     L5-S1:  Bilateral L5 pars defect, with grade 1 anterior subluxation L5.  Moderate disc degeneration and disc bulging L5-S1.  The central canal appears patent but there is neural foraminal stenosis worse on the left at this level.  Stenosis considered at   least moderate on the left, milder on the right.     No evidence for acute fracture visible in the lower thoracic spine, thoracolumbar junction and lumbar spine.     No paraspinal mass.     The lower visible thoracic spinal cord, and conus medullaris appear unremarkable.     No evidence for metastatic disease, osteomyelitis, discitis or other aggressive process in the spine.      Only seen at the very lowest margin of the large field of view localizer series is a cystic mass in the right pelvis/adnexa, also observed on CT of the abdomen from 19 days earlier, likely cystic mass of ovarian etiology.  This apparently with stable   when compared with earlier CT imaging.  This can be followed up if clinically needed.      =====  CONCLUSION:  Bilateral L5 pars defect with grade 1 anterior subluxation L5, and bilateral foraminal stenosis worse on the left.  Milder degenerative disc and facet changes at other levels as described above.        Dictated by: Miguelito Nesbitt MD on 12/28/2018 at 14:23       Approved by: Miguelito Nesbitt MD       Labs:  Lab Results   Component Value Date    WBC 4.6 10/20/2023    RBC 5.08 10/20/2023    HGB 16.0 10/20/2023    HCT 46.8 10/20/2023    .0 10/20/2023    MCV 92.1 10/20/2023    MCH 31.5 10/20/2023    MCHC 34.2 10/20/2023    RDW 12.3 10/20/2023    NEPRELIM 2.88 10/20/2023    NEPERCENT 62.9 10/20/2023    LYPERCENT 17.2 10/20/2023    MOPERCENT 14.0 10/20/2023    EOPERCENT 3.9 10/20/2023    BAPERCENT 0.7 10/20/2023    NE 2.88 10/20/2023    LYMABS 0.79 (L) 10/20/2023    MOABSO 0.64  10/20/2023    EOABSO 0.18 10/20/2023    BAABSO 0.03 10/20/2023     Lab Results   Component Value Date    GLU 92 10/20/2023    BUN 12 10/20/2023    BUNCREA 15.9 11/28/2022    CREATSERUM 1.11 10/20/2023    ANIONGAP 6 10/20/2023    GFR 70 08/09/2017    GFRNAA 79 07/20/2022    GFRAA 91 07/20/2022    CA 8.7 10/20/2023    OSMOCALC 289 10/20/2023    ALKPHO 96 10/20/2023    AST 29 10/20/2023    ALT 49 10/20/2023    BILT 2.3 (H) 10/20/2023    TP 7.0 10/20/2023    ALB 4.0 10/20/2023    GLOBULIN 3.0 10/20/2023    AGRATIO 2.2 (H) 06/04/2011     10/20/2023    K 4.0 10/20/2023     10/20/2023    CO2 27.0 10/20/2023       Additional Labs:  07/2022  ESR 11 normal  CRP normal   IgA and IgG normal  IgM 29.4 low    12/2021  CRP normal  ESR normal  C3 and C4 normal  Total complement borderline low  SPEP grossly normal except borderline low beta globulin  IgA and IgG normal  IgM 27.7 low    06/2021  TB negative  G6PD normal  ESR 9 normal  CRP normal  Hepatitis B serologies negative  Hepatitis C serology negative    02/2021  CRP normal  ESR 10 normal    10/2020  Uric acid 5.7  SPEP grossly normal   HLAB27 negative  CRP normal   ESR normal  ARNAUD negative  BAMBI panel negative  RF negative  CCP negative     09/2020  Blood culture neg x 2    08/19/2020  ESR 8 normal  Blood culture neg x 2    08/05/2020  HsCRP normal   ESR 6 normal   Uric acid 7.1 borderline     10/2019  ARNAUD screen negative  BAMBI panel negative  ESR 13 normal  CRP normal  CCP negative       Ruba Richards DO  [facility]  08/07/2024

## 2024-08-15 ENCOUNTER — LAB ENCOUNTER (OUTPATIENT)
Dept: LAB | Age: 69
End: 2024-08-15
Attending: INTERNAL MEDICINE
Payer: COMMERCIAL

## 2024-08-15 DIAGNOSIS — M25.50 POLYARTHRALGIA: ICD-10-CM

## 2024-08-15 DIAGNOSIS — M84.352S STRESS FRACTURE OF LEFT FEMUR, SEQUELA: ICD-10-CM

## 2024-08-15 DIAGNOSIS — E55.9 VITAMIN D DEFICIENCY: ICD-10-CM

## 2024-08-15 DIAGNOSIS — Z13.820 SCREENING FOR OSTEOPOROSIS: ICD-10-CM

## 2024-08-15 DIAGNOSIS — M19.90 INFLAMMATORY ARTHRITIS: ICD-10-CM

## 2024-08-15 LAB
ALBUMIN SERPL-MCNC: 4.5 G/DL (ref 3.2–4.8)
ALBUMIN/GLOB SERPL: 2.1 {RATIO} (ref 1–2)
ALP LIVER SERPL-CCNC: 74 U/L
ALT SERPL-CCNC: 37 U/L
ANION GAP SERPL CALC-SCNC: 4 MMOL/L (ref 0–18)
AST SERPL-CCNC: 35 U/L (ref ?–34)
BASOPHILS # BLD AUTO: 0.03 X10(3) UL (ref 0–0.2)
BASOPHILS NFR BLD AUTO: 0.9 %
BILIRUB SERPL-MCNC: 2.2 MG/DL (ref 0.2–1.1)
BUN BLD-MCNC: 15 MG/DL (ref 9–23)
CALCIUM BLD-MCNC: 9.1 MG/DL (ref 8.7–10.4)
CALCIUM BLD-MCNC: 9.4 MG/DL (ref 8.7–10.4)
CHLORIDE SERPL-SCNC: 108 MMOL/L (ref 98–112)
CO2 SERPL-SCNC: 27 MMOL/L (ref 21–32)
CREAT BLD-MCNC: 1 MG/DL
CREAT BLD-MCNC: 1 MG/DL
CRP SERPL-MCNC: <0.4 MG/DL (ref ?–0.5)
EGFRCR SERPLBLD CKD-EPI 2021: 82 ML/MIN/1.73M2 (ref 60–?)
EOSINOPHIL # BLD AUTO: 0.16 X10(3) UL (ref 0–0.7)
EOSINOPHIL NFR BLD AUTO: 4.6 %
ERYTHROCYTE [DISTWIDTH] IN BLOOD BY AUTOMATED COUNT: 12.8 %
ERYTHROCYTE [SEDIMENTATION RATE] IN BLOOD: 6 MM/HR
FASTING STATUS PATIENT QL REPORTED: YES
GLOBULIN PLAS-MCNC: 2.1 G/DL (ref 2–3.5)
GLUCOSE BLD-MCNC: 100 MG/DL (ref 70–99)
HCT VFR BLD AUTO: 42.2 %
HGB BLD-MCNC: 14.8 G/DL
IMM GRANULOCYTES # BLD AUTO: 0.04 X10(3) UL (ref 0–1)
IMM GRANULOCYTES NFR BLD: 1.1 %
LYMPHOCYTES # BLD AUTO: 0.83 X10(3) UL (ref 1–4)
LYMPHOCYTES NFR BLD AUTO: 23.9 %
MAGNESIUM SERPL-MCNC: 1.9 MG/DL (ref 1.6–2.6)
MCH RBC QN AUTO: 32.7 PG (ref 26–34)
MCHC RBC AUTO-ENTMCNC: 35.1 G/DL (ref 31–37)
MCV RBC AUTO: 93.4 FL
MONOCYTES # BLD AUTO: 0.63 X10(3) UL (ref 0.1–1)
MONOCYTES NFR BLD AUTO: 18.1 %
NEUTROPHILS # BLD AUTO: 1.79 X10 (3) UL (ref 1.5–7.7)
NEUTROPHILS # BLD AUTO: 1.79 X10(3) UL (ref 1.5–7.7)
NEUTROPHILS NFR BLD AUTO: 51.4 %
OSMOLALITY SERPL CALC.SUM OF ELEC: 289 MOSM/KG (ref 275–295)
PHOSPHATE SERPL-MCNC: 3 MG/DL (ref 2.4–5.1)
PLATELET # BLD AUTO: 148 10(3)UL (ref 150–450)
POTASSIUM SERPL-SCNC: 4 MMOL/L (ref 3.5–5.1)
PROT SERPL-MCNC: 6.6 G/DL (ref 5.7–8.2)
PTH-INTACT SERPL-MCNC: 59.6 PG/ML (ref 18.5–88)
RBC # BLD AUTO: 4.52 X10(6)UL
SODIUM SERPL-SCNC: 139 MMOL/L (ref 136–145)
T4 FREE SERPL-MCNC: 1.2 NG/DL (ref 0.8–1.7)
TSI SER-ACNC: 2.45 MIU/ML (ref 0.55–4.78)
VIT D+METAB SERPL-MCNC: 35.7 NG/ML (ref 30–100)
WBC # BLD AUTO: 3.5 X10(3) UL (ref 4–11)

## 2024-08-15 PROCEDURE — 82306 VITAMIN D 25 HYDROXY: CPT | Performed by: INTERNAL MEDICINE

## 2024-08-15 PROCEDURE — 82310 ASSAY OF CALCIUM: CPT | Performed by: INTERNAL MEDICINE

## 2024-08-15 PROCEDURE — 83970 ASSAY OF PARATHORMONE: CPT | Performed by: INTERNAL MEDICINE

## 2024-08-15 PROCEDURE — 83735 ASSAY OF MAGNESIUM: CPT | Performed by: INTERNAL MEDICINE

## 2024-08-15 PROCEDURE — 86140 C-REACTIVE PROTEIN: CPT | Performed by: INTERNAL MEDICINE

## 2024-08-15 PROCEDURE — 84439 ASSAY OF FREE THYROXINE: CPT | Performed by: INTERNAL MEDICINE

## 2024-08-15 PROCEDURE — 84100 ASSAY OF PHOSPHORUS: CPT | Performed by: INTERNAL MEDICINE

## 2024-08-15 PROCEDURE — 82565 ASSAY OF CREATININE: CPT | Performed by: INTERNAL MEDICINE

## 2024-08-15 PROCEDURE — 80050 GENERAL HEALTH PANEL: CPT | Performed by: INTERNAL MEDICINE

## 2024-08-15 PROCEDURE — 85652 RBC SED RATE AUTOMATED: CPT | Performed by: INTERNAL MEDICINE

## 2024-08-16 ENCOUNTER — HOSPITAL ENCOUNTER (OUTPATIENT)
Dept: BONE DENSITY | Age: 69
Discharge: HOME | End: 2024-08-16
Attending: INTERNAL MEDICINE
Payer: COMMERCIAL

## 2024-08-16 DIAGNOSIS — M84.352S STRESS FRACTURE OF LEFT FEMUR, SEQUELA: ICD-10-CM

## 2024-08-16 DIAGNOSIS — M19.90 INFLAMMATORY ARTHRITIS: ICD-10-CM

## 2024-08-16 DIAGNOSIS — Z13.820 SCREENING FOR OSTEOPOROSIS: ICD-10-CM

## 2024-08-16 PROCEDURE — 77080 DXA BONE DENSITY AXIAL: CPT | Performed by: INTERNAL MEDICINE

## 2024-08-23 ENCOUNTER — TELEPHONE (OUTPATIENT)
Dept: RHEUMATOLOGY | Facility: CLINIC | Age: 69
End: 2024-08-23

## 2024-08-23 DIAGNOSIS — M19.90 INFLAMMATORY ARTHRITIS: Primary | ICD-10-CM

## 2024-08-23 RX ORDER — COLCHICINE 0.6 MG/1
0.6 TABLET ORAL DAILY
Qty: 90 TABLET | Refills: 0 | Status: SHIPPED | OUTPATIENT
Start: 2024-08-23

## 2024-08-23 NOTE — PROGRESS NOTES
Spoke with pt. instructed regarding [facility] for [facility]. Or pt. can go on line for a RX coupon. He states he is going to call [facility] pharmacy to find out (out of pocket) price for [facility].

## 2024-08-26 ENCOUNTER — TELEPHONE (OUTPATIENT)
Dept: RHEUMATOLOGY | Facility: CLINIC | Age: 69
End: 2024-08-26

## 2024-09-11 ENCOUNTER — OFFICE VISIT (OUTPATIENT)
Dept: HEMATOLOGY/ONCOLOGY | Facility: HOSPITAL | Age: 69
End: 2024-09-11
Attending: INTERNAL MEDICINE
Payer: COMMERCIAL

## 2024-09-11 VITALS
WEIGHT: 213 LBS | BODY MASS INDEX: 31 KG/M2 | SYSTOLIC BLOOD PRESSURE: 164 MMHG | DIASTOLIC BLOOD PRESSURE: 78 MMHG | HEART RATE: 67 BPM | OXYGEN SATURATION: 97 % | RESPIRATION RATE: 18 BRPM | TEMPERATURE: 98 F

## 2024-09-11 DIAGNOSIS — C43.72 MALIGNANT MELANOMA OF LEFT LOWER LEG (HCC): ICD-10-CM

## 2024-09-11 DIAGNOSIS — C61 PROSTATE CANCER (HCC): ICD-10-CM

## 2024-09-11 DIAGNOSIS — C50.121 MALIGNANT NEOPLASM OF CENTRAL PORTION OF RIGHT BREAST IN MALE, ESTROGEN RECEPTOR POSITIVE (HCC): Primary | ICD-10-CM

## 2024-09-11 DIAGNOSIS — Z17.0 MALIGNANT NEOPLASM OF CENTRAL PORTION OF RIGHT BREAST IN MALE, ESTROGEN RECEPTOR POSITIVE (HCC): Primary | ICD-10-CM

## 2024-09-11 PROCEDURE — 99213 OFFICE O/P EST LOW 20 MIN: CPT | Performed by: INTERNAL MEDICINE

## 2024-09-11 NOTE — PROGRESS NOTES
Cancer Center Progress Note    Problem List:      Patient Active Problem List   Diagnosis    History of prostate cancer    Pain in joint, ankle and foot    Mixed hyperlipidemia    Vitamin D deficiency    Insomnia    Hemangioma    Nephrolithiasis    History of breast cancer in male    Family history of malignant neoplasm of breast    PRICILA on CPAP    Thrombocytopenia (HCC)    ED (erectile dysfunction) of organic origin    PEDRO (stress urinary incontinence), male    Psoriasis    NAFLD (nonalcoholic fatty liver disease)    History of malignant melanoma of skin    Chronic obstructive pulmonary disease, unspecified (HCC)    SVT (supraventricular tachycardia) (HCC)    Lymphedema    Primary hypertension    Inflammatory polyarthritis (HCC)    Selective IgM deficiency (HCC)       Interim History:    Cesar Wray presents today for evaluation and management of a diagnosis of right breast cancer    He has had pain in the left knee. He had MRI in 3/2024 that showed microfracture of medial femoral condyle. HE had repeat MRI on 6/26/2024 with decreased edema compared to the prior exam. There was subtle cortical fracture that was new.    He has no other pain. He has no dyspnea or cough. He has no other complaints.    He has been diagnosed with a malignant melanoma on the left lower leg. He had breslow depth 0.7 mm on biopsy 8/18/2022 by Dr. Arnold. He had WLE on 9/9/2022 with residual melanoma in situ. SLN 0/1. He had re-excision on 10/25/2022 with no residual disease. He had plastic surgery reconstruction.      The patient is being followed for an invasive ductal carcinoma measuring 2 cm with a single positive lymph node with micrometastatic disease (0.5 mm). The Oncotype Dx testing had a recurrence score of 18. He had been on Tamoxifen from 1/2016 until 1/2021.      The patient has a personal history of prostate cancer that was diagnosed after getting screening PSA's. The PSA had increased to 7.5. He had a radical  prostatectomy in 1/2015.  He had a Gleasons 7 (3+4). He had 14 negative nodes. The margins were negative. This was staged as T2c, N0.      Review of Systems:   Constitutional: Negative for anorexia, fatigue, fevers, chills, night sweats and weight loss.  Genitourinary: Negative for dysuria or hematuria  Neurological: Negative for headaches, dizziness, speech problems, gait problems and focal weakness.  Psychiatric: The patient's mood was calm and appropriate for this visit.  The pertinent positives and negatives were described. All other systems were negative.      PMH/PSH:  Past Medical History:    Actinic keratosis    left wrist    Actinic keratosis    right forearm    Actinic keratosis    left neck    Actinic keratosis    left forearm    Anesthesia complication    HARD TO AWAKEN AFTER PROSTECTOMY    Arthritis    Atelectasis of both lungs    Basal cell carcinoma    left upper arm    Basal cell carcinoma    right shoulder; left forearm    Basal cell carcinoma    right forehead    Breast cancer (HCC)    right breast     Calculus of kidney    left lower lobe     Esophageal reflux    High blood pressure    High cholesterol    Hyperlipidemia    Incontinence    bladder     Lymphedema of arm    right; wears sleeve; NO BP/IV to right arm     Malignant melanoma (HCC)    left calf    FRIEDMAN (nonalcoholic steatohepatitis)    Dr Louis ; from tamoxifen - stable    Osteoarthritis    Personal history of arthritis    Prostate cancer (HCC)    [facility]    Sleep apnea    CPAP    Squamous cell carcinoma    in-situ, mid upper forehead    Squamous cell carcinoma    in-situ, mid upper chest    Squamous cell carcinoma    in-situ, left upper arm; in-situ, left forearm    Squamous cell carcinoma    in-situ, right posterior neck; in-situ, right upper arm    Visual impairment    glasses       Past Surgical History:   Procedure Laterality Date    Colonoscopy N/A 12/05/2016    Procedure: COLONOSCOPY;  Surgeon: Cole Storey MD;   Location:  ENDOSCOPY    Colonoscopy  12/15/2018    Colonoscopy & polypectomy  12/05/2016    small polyp at prior polypectomy site     Foot/toes surgery proc unlisted  12/2012    Tarsil fusion, 8 screws and 21 plate    Hand/finger surgery unlisted      surgery of the thumb    Laparoscopy, surgical prostatectomy, retropubic radical, w/nerve sparing  01/2015    Leg/ankle surgery proc unlisted  1978 1989    first left then right    Mastectomy right  12/15/2015    SENTINEL AND AXILLARY LYMPH NODE REMOVAL, Do not use right arm for IV/BP    Other surgical history  01/2015    radical prostatectomy    Other surgical history Left 09/2022    melanoma removal calf    Other surgical history Left 10/2022    melanoma removal calf    Repair of biceps tendon at elbow  6/20012    Dr Reyes at [facility]    Repair rotator cuff,chronic      Shoulder surg proc unlisted Bilateral 2004/2012    right shoulder/left    Skin surgery Right 06/2022    shoulder skin cancer removal    Tonsillectomy         Family History Reviewed:  Family History   Problem Relation Age of Onset    Breast Cancer Sister 50        uterine ca after tamoxifen tx (dx 51)    Cancer Sister 51        uterine ca (due to tamoxifen)    Other (Benign breast biopsy) Sister 50    Gastro-Intestinal Disorder Father         Ulcers    Diabetes Father     Neurological Disorder Father         Parkinson's Disease    Other (Other) Father     Heart Disease Mother     Breast Cancer Mother 75        d.82    Other (Benign breast biopsy) Sister 45    Other (Benign breast biopsy) Sister     Breast Cancer Paternal Aunt         d.92    Cancer Maternal Uncle         brain ca; d.40's    Other (Gout) Brother     Cancer Self 59        prostate    Breast Cancer Self 60        invasive ductal ca       Allergies:     Allergies   Allergen Reactions    Levaquin [Levofloxacin] SWELLING     Other reaction(s): Other  Insomnia; swelling      Hibiclens RASH       Medications:   colchicine (COLCRYS) 0.6 MG Oral  Tab Take 1 tablet (0.6 mg total) by mouth daily. 90 tablet 0    ascorbic acid 100 MG Oral Tab Vitamin C 100 mg tablet, [RxNorm: 480614]      cyanocobalamin 1000 MCG Oral Tab cyanocobalamin (vit B-12) 1,000 mcg tablet, [RxNorm: 833187]      SILDENAFIL CITRATE 100 MG Oral Tab TAKE 1 TABLET BY MOUTH EVERY DAY AS NEEDED 30 tablet 1    Zolpidem Tartrate ER 12.5 MG Oral Tab CR TAKE ONE TABLET BY MOUTH NIGHTLY AS NEEDED FOR SLEEP 90 tablet 1    Roflumilast (ZORYVE) 0.3 % External Cream Apply 1 Application topically daily. 60 g 3    azelastine 0.1 % Nasal Solution INHALE 2 SPRAYS NASALLY 2 TIMES DAILY      pantoprazole 40 MG Oral Tab EC Take 1 tablet (40 mg total) by mouth 2 (two) times daily.      atorvastatin 80 MG Oral Tab Take 1 tablet (80 mg total) by mouth daily. 90 tablet 3    Multiple Vitamin (ONE-DAILY MULTI VITAMINS) Oral Tab Take 1 tablet by mouth daily.      Cholecalciferol (VITAMIN D) 50 MCG (2000 UT) Oral Cap Take by mouth.           Vital Signs:      Height: --  Weight: 96.6 kg (213 lb) (09/11 1534)  BSA (Calculated - sq m): --  Pulse: 67 (09/11 1534)  BP: 164/78 (09/11 1534)  Temp: 97.9 °F (36.6 °C) (09/11 1534)  Do Not Use - Resp Rate: --  SpO2: 97 % (09/11 1534)      Performance Status:  ECOG 0: Fully active, able to carry on all pre-disease performance without restriction     Physical Examination:    Constitutional: Patient is alert and not in acute distress.  Eyes: Anicteric sclera, pink conjunctiva.  HEENT:  Oropharynx is clear. Neck is supple.  Respiratory: Clear to auscultation and percussion. No rales.  No wheezes.  Chest: The chest has no skin lesion and no mass bilaterally.  Cardiovascular: Regular rate and rhythm. No murmurs.  Gastrointestinal: Soft, non tender with good bowel sounds.  Musculoskeletal: No edema. No calf tenderness.  Neurological: Grossly intact without focal motor or sensory deficit.  Skin: No suspicious skin lesion, no rash, no ulceration.  Lymphatics: There is no palpable  lymphadenopathy throughout in the cervical, supraclavicular, or axillary regions.  Psychiatric: The patient's mood is calm and appropriate for this visit.      Labs reviewed at this visit:     Lab Results   Component Value Date    WBC 3.5 (L) 08/15/2024    RBC 4.52 08/15/2024    HGB 14.8 08/15/2024    HCT 42.2 08/15/2024    MCV 93.4 08/15/2024    MCH 32.7 08/15/2024    MCHC 35.1 08/15/2024    RDW 12.8 08/15/2024    .0 (L) 08/15/2024     Lab Results   Component Value Date     08/15/2024    K 4.0 08/15/2024     08/15/2024    CO2 27.0 08/15/2024    BUN 15 08/15/2024    CREATSERUM 1.00 08/15/2024    CREATSERUM 1.00 08/15/2024     (H) 08/15/2024    CA 9.1 08/15/2024    CA 9.4 08/15/2024    ALKPHO 74 08/15/2024    ALT 37 08/15/2024    AST 35 (H) 08/15/2024    BILT 2.2 (H) 08/15/2024    ALB 4.5 08/15/2024    TP 6.6 08/15/2024       Radiologic imaging reviewed at this visit:    CT of chest on 8/2/2022:  FINDINGS:   Bandlike scar or atelectasis in the base of the left middle lobe, and the medial aspect of the left lower lobe and in the medial aspect of the right lower lobe. No confluent airspace opacification. Right lower lobe 3 mm noncalcified nodule (series 6   image 199). Left lower lobe 2 mm noncalcified nodule (series 6 image 266). In the medial aspect of the left lower lobe (series 6 images 148 through 173), along the aorta, a pulmonary vein and several lower lobe bronchi are closely adherent to the aorta.   The vessels are otherwise normally tapering.   Pleural spaces are clear.   Trachea and airways are clear.   Unremarkable thyroid. Air-fluid level through the distal portion of the esophagus. No pathologically enlarged lymph nodes in the axilla, mediastinum or hilum. Surgical clips are present in the right axilla. Normal size heart with no pericardial   thickening. Coronary artery calcifications are present. Limited views of intra-abdominal structures are unremarkable. Skeletal structures  demonstrate surgical anchors in the humeral head bilaterally. Mild multilevel disc degeneration and facet   arthropathy. Chest wall is bilateral symmetric and normal appearing. Ribs are normal in appearance.     IMPRESSION:     1. Right lower lobe 3 mm left lower lobe 2 mm noncalcified nodules. These are most likely the sequela of prior infection or inflammation. If patient at high risk for pulmonary malignancy consider follow-up CT scan in one year.   2. Scattered areas of scar and/or atelectasis in the left middle lobe and in the lower lobes bilaterally. Focus of probable chronic scar and/or atelectasis with several left lower lobe airways and a pulmonary vein inseparable from the pleura overlying   left lateral wall of the aorta. Uncertain if these structures transiently or chronically fixed/tethered to the pleura.   3. Air-fluid level throughout the distal half of the esophagus. These findings can be seen associated with esophageal dysmotility and/or gastroesophageal reflux. Alternatively this could represent transient secretions.       Mammogram on 10/31/2022:  BREAST COMPOSITION:  Almost entirely fatty.       FINDINGS:  No suspicious microcalcifications, asymmetry, mass or architectural distortion.      Impression   CONCLUSION:         BI-RADS CATEGORY:     DIAGNOSTIC CATEGORY 1--NEGATIVE NO CHANGE FROM COMPARISON ASSESSMENT.         RECOMMENDATIONS:     CLINICAL EVALUATION.       A letter explaining the results in lay terms has been sent to the patient.  This exam was evaluated with a computer-aided device. This patient's information has been entered into a reminder system with a target due date for the next mammogram.             Assessment/Plan:     Stage IIA right breast cancer in the central breast:  Right simple mastectomy on 12/15/2015  1 node with micrometastases out of 14 nodes removed  Tumor size 2.0 cm  %; %  HER-2 negative  Ki-67 2%  Oncotype Dx RS 18     He is doing great with SHORTY. He  has had almost 6 years of Tamoxifen 20 mg daily. This was stopped in 1/2021. I will see him in one year.     He is getting a diagnostic mammogram of the left. We will get this yearly.     Prostate Cancer:  The patient has a personal history of prostate cancer that was diagnosed after getting screening PSA's. The PSA had increased to 7.5. He had a radical prostatectomy in 1/2015.  He had a Gleasons 7 (3+4). He had 14 negative nodes. The margins were negative. This was staged as T2c, N0.     Genetic testing: Negative  Gene Dx High/Moderate Risk Panel     Chronic thrombocytopenia:  Likely ITP     Most recent CBC has minimal thrombocytopenia.    Stage IA malignant melanoma of right lower leg:  S/P WLE with re-excision in 10/2022    Patient will return to see me in 12 months.    Left femoral condyle fracture:    He will continue with ortho follow up and management.      Willem Steele MD

## 2024-09-11 NOTE — PROGRESS NOTES
Patient here for 1 year f/u for prostate and breast cancer. Patient states he had a microfracture left femur without injury in March 2024. Patient has no further complaints or concerns at this time.     Education Record    Learner:  Patient    Disease / Diagnosis: breast and prostate cancer     Barriers / Limitations:  None   Comments:    Method:  Discussion   Comments:    General Topics:  Medication, Side effects and symptom management, and Plan of care reviewed   Comments:    Outcome:  Shows understanding   Comments:

## 2024-09-12 NOTE — TELEPHONE ENCOUNTER
LOV 1-26-21, virtual  Future Appointments   Date Time Provider Magdaleno Leongi   6/10/2021  2:30 PM Arjun Lincoln MD [facility] Jari Cooks     Pt requesting Rf Nabumetone 500mg BID? F/u with Dr Jose Mendoza scheduled  Does pt need labs for this visit? 2.5

## 2024-10-15 ENCOUNTER — OFFICE VISIT (OUTPATIENT)
Dept: SURGERY | Facility: CLINIC | Age: 69
End: 2024-10-15
Payer: COMMERCIAL

## 2024-10-15 VITALS
OXYGEN SATURATION: 97 % | SYSTOLIC BLOOD PRESSURE: 131 MMHG | TEMPERATURE: 98 F | BODY MASS INDEX: 30 KG/M2 | DIASTOLIC BLOOD PRESSURE: 70 MMHG | WEIGHT: 206 LBS | HEART RATE: 80 BPM | RESPIRATION RATE: 18 BRPM

## 2024-10-15 DIAGNOSIS — Z85.3 HISTORY OF RIGHT BREAST CANCER: ICD-10-CM

## 2024-10-15 DIAGNOSIS — N64.4 BREAST PAIN, LEFT: Primary | ICD-10-CM

## 2024-10-15 PROCEDURE — 3075F SYST BP GE 130 - 139MM HG: CPT | Performed by: SURGERY

## 2024-10-15 PROCEDURE — 99213 OFFICE O/P EST LOW 20 MIN: CPT | Performed by: SURGERY

## 2024-10-15 PROCEDURE — 3078F DIAST BP <80 MM HG: CPT | Performed by: SURGERY

## 2024-10-18 ENCOUNTER — OFFICE VISIT (OUTPATIENT)
Dept: FAMILY MEDICINE CLINIC | Facility: CLINIC | Age: 69
End: 2024-10-18
Payer: COMMERCIAL

## 2024-10-18 ENCOUNTER — TELEPHONE (OUTPATIENT)
Dept: FAMILY MEDICINE CLINIC | Facility: CLINIC | Age: 69
End: 2024-10-18

## 2024-10-18 VITALS
BODY MASS INDEX: 29.98 KG/M2 | RESPIRATION RATE: 14 BRPM | WEIGHT: 202.38 LBS | TEMPERATURE: 103 F | HEIGHT: 69 IN | SYSTOLIC BLOOD PRESSURE: 136 MMHG | DIASTOLIC BLOOD PRESSURE: 80 MMHG | HEART RATE: 84 BPM

## 2024-10-18 DIAGNOSIS — J01.41 ACUTE RECURRENT PANSINUSITIS: Primary | ICD-10-CM

## 2024-10-18 PROCEDURE — G2211 COMPLEX E/M VISIT ADD ON: HCPCS | Performed by: FAMILY MEDICINE

## 2024-10-18 PROCEDURE — 99213 OFFICE O/P EST LOW 20 MIN: CPT | Performed by: FAMILY MEDICINE

## 2024-10-18 PROCEDURE — 3008F BODY MASS INDEX DOCD: CPT | Performed by: FAMILY MEDICINE

## 2024-10-18 PROCEDURE — 3079F DIAST BP 80-89 MM HG: CPT | Performed by: FAMILY MEDICINE

## 2024-10-18 PROCEDURE — 3075F SYST BP GE 130 - 139MM HG: CPT | Performed by: FAMILY MEDICINE

## 2024-10-18 RX ORDER — CEPHALEXIN 500 MG/1
500 CAPSULE ORAL 3 TIMES DAILY
Qty: 21 CAPSULE | Refills: 0 | Status: SHIPPED | OUTPATIENT
Start: 2024-10-18 | End: 2024-10-25

## 2024-10-18 RX ORDER — AZITHROMYCIN 250 MG/1
TABLET, FILM COATED ORAL
Qty: 6 TABLET | Refills: 0 | Status: SHIPPED | OUTPATIENT
Start: 2024-10-18 | End: 2024-10-22

## 2024-10-18 NOTE — TELEPHONE ENCOUNTER
Called and talked to patient, he has had cough with productive green sputum his wife who is a school nurse feels he has pertusis, scheduled an appointment today will wear a mask when he comes in.

## 2024-10-18 NOTE — TELEPHONE ENCOUNTER
[facility] is calling because there is a drug interaction with his   azithromycin (ZITHROMAX Z-SRUTHI) 250 MG Oral Tab

## 2024-10-18 NOTE — TELEPHONE ENCOUNTER
Pts calling because he thinks he has pertussis and wants to know what he needs to do, has had cough for about 3 days fever 100.7 with green mucus

## 2024-10-18 NOTE — PROGRESS NOTES
Subjective:   Gumaro is a 69 year old male coming in for had concerns including Cough (Started on Tuesday, cough is lingering around and not going away, had a fever since wednesday ).   HPI   Lingering cough, wife is a teacher. Cough, congestion ongoing for weeks.     Objective:   /80   Pulse 84   Temp (!) 102.5 °F (39.2 °C) (Oral)   Resp 14   Ht 5' 9\" (1.753 m)   Wt 202 lb 6.4 oz (91.8 kg)   BMI 29.89 kg/m²  Body mass index is 29.89 kg/m².   Physical Exam  Vitals and nursing note reviewed.   Constitutional:       General: He is not in acute distress.  HENT:      Head: Normocephalic.      Right Ear: Tympanic membrane, ear canal and external ear normal.      Left Ear: Tympanic membrane and external ear normal.      Nose: Mucosal edema present. No septal deviation or rhinorrhea.      Right Sinus: Frontal sinus tenderness present.      Mouth/Throat:      Pharynx: Posterior oropharyngeal erythema present. No oropharyngeal exudate.   Eyes:      Conjunctiva/sclera: Conjunctivae normal.   Cardiovascular:      Rate and Rhythm: Normal rate and regular rhythm.      Heart sounds: Normal heart sounds.   Pulmonary:      Effort: Pulmonary effort is normal. No respiratory distress.      Breath sounds: Normal breath sounds.   Abdominal:      Palpations: Abdomen is soft.   Musculoskeletal:      Cervical back: Normal range of motion.   Skin:     General: Skin is warm and dry.   Neurological:      Mental Status: He is alert and oriented to person, place, and time.           Assessment & Plan  Acute recurrent pansinusitis  Possible pertussis, will start meds to cover. Will follow clsoely.  Orders:    Azithromycin; Take 2 tablets (500 mg total) by mouth daily for 1 day, THEN 1 tablet (250 mg total) daily for 4 days.  Dispense: 6 tablet; Refill: 0    Cephalexin; Take 1 capsule (500 mg total) by mouth 3 (three) times daily for 7 days.  Dispense: 21 capsule; Refill: 0             I am having Mr. Gumaro Wray start on  azithromycin and cephALEXin. I am also having him maintain his Vitamin D, One-Daily Multi Vitamins, atorvastatin, pantoprazole, azelastine, Zoryve, Zolpidem Tartrate ER, Sildenafil Citrate, ascorbic acid, cyanocobalamin, and colchicine.       Return in about 2 months (around 12/18/2024) for Annual physical.

## 2024-10-31 DIAGNOSIS — M19.90 INFLAMMATORY ARTHRITIS: ICD-10-CM

## 2024-10-31 PROBLEM — N64.4 BREAST PAIN, LEFT: Status: ACTIVE | Noted: 2024-10-31

## 2024-10-31 PROBLEM — Z85.3 HISTORY OF RIGHT BREAST CANCER: Status: ACTIVE | Noted: 2024-10-31

## 2024-10-31 NOTE — PROGRESS NOTES
Breast Surgery Follow-Up Visit    Diagnosis: Infiltrating Ductal Carcinoma, right breast; ER positive, AZ positive status post modified radical mastectomy without reconstruction on December 15, 2015.    Stage: T3tX8mgGt (Stage IB)    Disease Status:  Surgical treatment complete, Oncotype Dx 18, Tamoxifen completed.     History:   This 69 year old man presented with self-detected right breast cancer presents today for a surveillance visit.    He reports that he bumped the Right breast after which time he self-palpated a mass in the area. He was referred for baseline Bilateral Diagnostic Mammogram which took place on November 20, 2015 and showed focal parenchymal asymmetry with a few punctate calcifications in the subareolar right breast corresponding to the palpable abnormality with a 2.5 x 1.8 x 1.3 cm mass for which biopsy was recommended. He underwent Right breast US guided biopsy on November 27, 2015 and his pathology confirmed:  -Right subareolar: Invasive Ductal Carcinoma, Grade 2, %, %, Her-2/osvaldo negative, Ki-67 2%.     All genetic testing results were normal. He has felt well since surgery and denies significant pain or discomfort. He denies any palpable Left breast masses or other changes. He also reports that he wears his compression garment regularly. He had a Left mammogram in October 2023 which was unremarkable.  He continues to be active and denies any new concerns related to bilateral chest walls aside from chronic lymphedema to the right chest wall.  He presents today for recommendations regarding further surveillance.        Past Medical History:    Actinic keratosis    left wrist    Actinic keratosis    right forearm    Actinic keratosis    left neck    Actinic keratosis    left forearm    Anesthesia complication    HARD TO AWAKEN AFTER PROSTECTOMY    Arthritis    Atelectasis of both lungs    Basal cell carcinoma    left upper arm    Basal cell carcinoma    right shoulder; left forearm     Basal cell carcinoma    right forehead    Breast cancer (HCC)    right breast     Calculus of kidney    left lower lobe     Esophageal reflux    High blood pressure    High cholesterol    Hyperlipidemia    Incontinence    bladder     Lymphedema of arm    right; wears sleeve; NO BP/IV to right arm     Malignant melanoma (HCC)    left calf    FRIEDMAN (nonalcoholic steatohepatitis)    Dr Louis ; from tamoxifen - stable    Osteoarthritis    Personal history of arthritis    Prostate cancer (HCC)    [facility]    Sleep apnea    CPAP    Squamous cell carcinoma    in-situ, mid upper forehead    Squamous cell carcinoma    in-situ, mid upper chest    Squamous cell carcinoma    in-situ, left upper arm; in-situ, left forearm    Squamous cell carcinoma    in-situ, right posterior neck; in-situ, right upper arm    Visual impairment    glasses     Past Surgical History:   Procedure Laterality Date    Colonoscopy N/A 12/05/2016    Procedure: COLONOSCOPY;  Surgeon: Cole Storey MD;  Location:  ENDOSCOPY    Colonoscopy  12/15/2018    Colonoscopy & polypectomy  12/05/2016    small polyp at prior polypectomy site     Foot/toes surgery proc unlisted  12/2012    Tarsil fusion, 8 screws and 21 plate    Hand/finger surgery unlisted      surgery of the thumb    Laparoscopy, surgical prostatectomy, retropubic radical, w/nerve sparing  01/2015    Leg/ankle surgery proc unlisted  1978 1989    first left then right    Mastectomy right  12/15/2015    SENTINEL AND AXILLARY LYMPH NODE REMOVAL, Do not use right arm for IV/BP    Other surgical history  01/2015    radical prostatectomy    Other surgical history Left 09/2022    melanoma removal calf    Other surgical history Left 10/2022    melanoma removal calf    Repair of biceps tendon at elbow  6/20012    Dr Reyes at [facility]    Repair rotator cuff,chronic      Shoulder surg proc unlisted Bilateral 2004/2012    right shoulder/left    Skin surgery Right 06/2022    shoulder skin cancer  removal    Tonsillectomy         Medications:    Current Outpatient Medications on File Prior to Visit   Medication Sig Dispense Refill    colchicine (COLCRYS) 0.6 MG Oral Tab Take 1 tablet (0.6 mg total) by mouth daily. 90 tablet 0    ascorbic acid 100 MG Oral Tab Vitamin C 100 mg tablet, [RxNorm: 786600]      cyanocobalamin 1000 MCG Oral Tab cyanocobalamin (vit B-12) 1,000 mcg tablet, [RxNorm: 993720]      SILDENAFIL CITRATE 100 MG Oral Tab TAKE 1 TABLET BY MOUTH EVERY DAY AS NEEDED 30 tablet 1    Zolpidem Tartrate ER 12.5 MG Oral Tab CR TAKE ONE TABLET BY MOUTH NIGHTLY AS NEEDED FOR SLEEP 90 tablet 1    Roflumilast (ZORYVE) 0.3 % External Cream Apply 1 Application topically daily. 60 g 3    azelastine 0.1 % Nasal Solution INHALE 2 SPRAYS NASALLY 2 TIMES DAILY      pantoprazole 40 MG Oral Tab EC Take 1 tablet (40 mg total) by mouth 2 (two) times daily.      atorvastatin 80 MG Oral Tab Take 1 tablet (80 mg total) by mouth daily. 90 tablet 3    Multiple Vitamin (ONE-DAILY MULTI VITAMINS) Oral Tab Take 1 tablet by mouth daily.      Cholecalciferol (VITAMIN D) 50 MCG (2000 UT) Oral Cap Take by mouth.       No current facility-administered medications on file prior to visit.       Allergies:    Allergies   Allergen Reactions    Levaquin [Levofloxacin] SWELLING     Other reaction(s): Other  Insomnia; swelling      Hibiclens RASH        Family History   Problem Relation Age of Onset    Breast Cancer Sister 50        uterine ca after tamoxifen tx (dx 51)    Cancer Sister 51        uterine ca (due to tamoxifen)    Other (Benign breast biopsy) Sister 50    Gastro-Intestinal Disorder Father         Ulcers    Diabetes Father     Neurological Disorder Father         Parkinson's Disease    Other (Other) Father     Heart Disease Mother     Breast Cancer Mother 75        d.82    Other (Benign breast biopsy) Sister 45    Other (Benign breast biopsy) Sister     Breast Cancer Paternal Aunt         d.92    Cancer Maternal Uncle          brain ca; d.40's    Other (Gout) Brother     Cancer Self 59        prostate    Breast Cancer Self 60        invasive ductal ca   He is not of Ashkenazi Amish ancestry.    Social History:  History   Alcohol Use Not Currently       History   Smoking Status    Never   Smokeless Tobacco    Never     The patient is . He has 3 children. 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    Review of Systems:  General:   The patient denies, fever, chills, night sweats, fatigue, generalized weakness, change in appetite or weight loss.    HEENT:     The patient denies eye irritation, cataracts, redness, glaucoma, yellowing of the eyes, change in vision or color blindness. The patient denies +hearing loss, ringing in the ears, ear drainage, earaches, nasal congestion, nose bleeds, snoring, pain in mouth/throat, hoarseness, change in voice, facial trauma.    Respiratory:  The patient denies chronic cough, phlegm, hemoptysis, pleurisy/chest pain, pneumonia, asthma, wheezing, difficulty in breathing with exertion, emphysema, chronic bronchitis, shortness of breast or abnormal sound when breathing.     Cardiovascular:  There is no history of chest pain, chest pressure/discomfort, palpitations, irregular heartbeat, fainting or near-fainting, difficulty breathing when lying flat, SOB/Coughing at night, swelling of the legs or chest pain while walking.    Breasts:  See history of present illness    Gastrointestinal:     There is no history of difficulty or pain with swallowing, reflux symptoms, vomiting, dark or bloody stools, constipation, yellowing of the skin, indigestion, nausea, change in bowel habits, diarrhea, abdominal pain or vomiting blood.     Genitourinary:  The patient denies +frequent urination, needing to get up at night to urinate, urinary hesitancy or retaining urine, painful urination, +urinary incontinence, decreased urine stream, +blood in the urine or vaginal/penile  discharge.    Skin:    The patient denies rash, itching, skin lesions, dry skin, change in skin color or change in moles.     Hematologic/Lymphatic:  The patient denies easily bruising or bleeding or persistent swollen glands or lymph nodes.     Musculoskeletal:  The patient denies muscle aches/pain, +joint pain, stiff joints, neck pain, back pain or bone pain.    Neuropsychiatric:  There is no history of migraines or severe headaches, seizure/epilepsy, speech problems, coordination problems, trembling/tremors, fainting/black outs, dizziness, memory problems, loss of sensation/numbness, problems walking, weakness, tingling or burning in hands/feet. There is no history of abusive relationship, bipolar disorder, sleep disturbance, anxiety, depression or feeling of despair.    Endocrine:    There is no history of poor/slow wound healing, weight loss/gain, fertility or hormone problems, cold intolerance, thyroid disease.     Allergic/Immunologic:  There is no history of hives, hay fever, angioedema or anaphylaxis.    /70 (BP Location: Left arm, Patient Position: Sitting, Cuff Size: adult)   Pulse 80   Temp 98.3 °F (36.8 °C) (Temporal)   Resp 18   Wt 93.4 kg (206 lb)   SpO2 97%   BMI 30.42 kg/m²     Physical Examination:  This is a well-nourished, alert and oriented man. There is not palpable cervical, supraclavicular or axillary adenopathy.  The neck is supple with a midline trachea and no thyromegaly. Range of motion is good at both shoulders. Breasts are asymmetrical. The Right is surgically absent with a well healed incision and no underlying palpable concerns. The Left nipple is everted with no discharge. There is not dominant masses, focal nodularity or skin changes on either side. The abdomen is soft, flat and nontender, with no palpable masses or organomegaly. Left axilla with focal tenderness without a palpable mass.    Impression:   Mr. Cesar Wray is a 69 year old man s/p R MRM for  right  breast cancer, Stage [id number] (Stage IB).    Discussion and Plan:  I had a discussion with the Patient regarding his exam. On exam today, he is healing well since surgery with no evidence of local recurrence. He is benefiting from compression therapy and the flexitouch device in light of his persistent stiffness/swelling. His Oncotype Dx was reviewed by Dr. Steele who recommended Tamoxifen therapy only which he has now completed. We also discussed that in light of his normal genetic testing results, no regular surveillance will be required for his contralateral breast though annual L mammogram can be continued based on patient's preference.  He continues to follow with therapy as needed for his lymphedema.  He will return to see me in one year for surveillance. He was encouraged to contact the office with any questions or concerns prior to his next scheduled appointment.     This encounter lasted a total of 25 minutes, more than 50% of which was dedicated to the discussion of management options.

## 2024-11-01 RX ORDER — COLCHICINE 0.6 MG/1
0.6 TABLET ORAL DAILY
Qty: 90 TABLET | Refills: 0 | Status: SHIPPED | OUTPATIENT
Start: 2024-11-01

## 2024-11-01 NOTE — TELEPHONE ENCOUNTER
Future Appointments   Date Time Provider Department Center   11/13/2024  8:30 AM Cesar Arnold MD [facility]   11/14/2024  7:45 AM Ramón Alex MD [facility] 3 [facility]   3/12/2025 11:15 AM Ruba Richards DO [facility] [place]   9/10/2025  3:45 PM Willem Steele MD [facility]EM [facility]   10/14/2025  8:45 AM Cande Zimmerman MD [facility] Surg/Onc     Last office visit: 8/7/2024    Last fill: 8/23/2024 90 tab, 0 refills

## 2024-11-13 NOTE — ASSESSMENT & PLAN NOTE
Cholesterol shows Good control. Long term heart-healthy diet and lifestyle discussed and encouraged to reduce risk of cardiovascular disease.  10/20/2023: Cholesterol, Total 128; HDL Cholesterol 54; Triglycerides 68; LDL Cholesterol 60  Cholesterol Meds: atorvastatin Tabs - 80 MG  stable  Continue with current treatment plan     Orders:    Detailed, Mod Complex (94333)

## 2024-11-13 NOTE — ASSESSMENT & PLAN NOTE
Stable, continue present management and continue to monitor for progression   Needs new device. Will get pulm eval.  Orders:    Pulmonary Referral - In Network    Detailed, Mod Complex ([id number])

## 2024-11-13 NOTE — ASSESSMENT & PLAN NOTE
Stable, continue present management and continue to monitor for progression on inhalers.    still with PND but no inhlaers.     Orders:    Detailed, Mod Complex ([id number])

## 2024-11-13 NOTE — ASSESSMENT & PLAN NOTE
Last Platelet value was 148 done 8/15/2024. Lowest level in the last 10 years was 119. Stable, continue present management and continue to monitor for progression

## 2024-11-13 NOTE — ASSESSMENT & PLAN NOTE
Stable, continue present management and continue to monitor for progression   Still normal.   Orders:    Detailed, Mod Complex (00129)

## 2024-11-13 NOTE — ASSESSMENT & PLAN NOTE
Stable, continue present management and continue to monitor for progression, Prostate CA diagnosis 2014, davinci prostatectomy at [facility] 1/2015, mild incontinence since surgery

## 2024-11-13 NOTE — ASSESSMENT & PLAN NOTE
BP shows borderline control with last BP of 130/86. Work on lifestyle changes, diet, exercise and weight management.   8/15/2024: Potassium 4.0; Creatinine 1.00; Creatinine 1.00; eGFR-Cr 82       Orders:    Detailed, Mod Complex ([id number])

## 2024-11-13 NOTE — ASSESSMENT & PLAN NOTE
Stable, continue present management and continue to monitor for progression per hematology   on Dist 203 CPX

## 2024-11-13 NOTE — ASSESSMENT & PLAN NOTE
Stable, continue present management and continue to monitor for progression on compression and massage.

## 2024-11-13 NOTE — ASSESSMENT & PLAN NOTE
Stable, continue present management and continue to monitor for progression     Orders:    Zolpidem Tartrate ER; TAKE ONE TABLET BY MOUTH NIGHTLY AS NEEDED FOR SLEEP  Dispense: 90 tablet; Refill: 1

## 2024-11-14 ENCOUNTER — OFFICE VISIT (OUTPATIENT)
Dept: FAMILY MEDICINE CLINIC | Facility: CLINIC | Age: 69
End: 2024-11-14
Payer: COMMERCIAL

## 2024-11-14 VITALS
RESPIRATION RATE: 12 BRPM | BODY MASS INDEX: 29.77 KG/M2 | WEIGHT: 201 LBS | DIASTOLIC BLOOD PRESSURE: 86 MMHG | SYSTOLIC BLOOD PRESSURE: 130 MMHG | HEIGHT: 69 IN | HEART RATE: 70 BPM

## 2024-11-14 DIAGNOSIS — I89.0 LYMPHEDEMA: ICD-10-CM

## 2024-11-14 DIAGNOSIS — D69.6 THROMBOCYTOPENIA (HCC): ICD-10-CM

## 2024-11-14 DIAGNOSIS — Z00.00 ANNUAL PHYSICAL EXAM: Primary | ICD-10-CM

## 2024-11-14 DIAGNOSIS — Z85.3 HISTORY OF BREAST CANCER IN MALE: ICD-10-CM

## 2024-11-14 DIAGNOSIS — D80.4 SELECTIVE IGM DEFICIENCY (HCC): ICD-10-CM

## 2024-11-14 DIAGNOSIS — Z85.46 HISTORY OF PROSTATE CANCER: ICD-10-CM

## 2024-11-14 DIAGNOSIS — I10 PRIMARY HYPERTENSION: ICD-10-CM

## 2024-11-14 DIAGNOSIS — Z00.00 ENCOUNTER FOR ANNUAL HEALTH EXAMINATION: ICD-10-CM

## 2024-11-14 DIAGNOSIS — I47.10 SVT (SUPRAVENTRICULAR TACHYCARDIA) (HCC): ICD-10-CM

## 2024-11-14 DIAGNOSIS — G47.33 OSA ON CPAP: ICD-10-CM

## 2024-11-14 DIAGNOSIS — F51.01 PRIMARY INSOMNIA: ICD-10-CM

## 2024-11-14 DIAGNOSIS — J44.9 CHRONIC OBSTRUCTIVE PULMONARY DISEASE, UNSPECIFIED COPD TYPE (HCC): ICD-10-CM

## 2024-11-14 DIAGNOSIS — K76.0 NAFLD (NONALCOHOLIC FATTY LIVER DISEASE): ICD-10-CM

## 2024-11-14 DIAGNOSIS — E78.2 MIXED HYPERLIPIDEMIA: ICD-10-CM

## 2024-11-14 DIAGNOSIS — L40.9 PSORIASIS: ICD-10-CM

## 2024-11-14 PROCEDURE — 3008F BODY MASS INDEX DOCD: CPT | Performed by: FAMILY MEDICINE

## 2024-11-14 PROCEDURE — 3075F SYST BP GE 130 - 139MM HG: CPT | Performed by: FAMILY MEDICINE

## 2024-11-14 PROCEDURE — 99397 PER PM REEVAL EST PAT 65+ YR: CPT | Performed by: FAMILY MEDICINE

## 2024-11-14 PROCEDURE — 3079F DIAST BP 80-89 MM HG: CPT | Performed by: FAMILY MEDICINE

## 2024-11-14 PROCEDURE — 90471 IMMUNIZATION ADMIN: CPT | Performed by: FAMILY MEDICINE

## 2024-11-14 PROCEDURE — 90677 PCV20 VACCINE IM: CPT | Performed by: FAMILY MEDICINE

## 2024-11-14 PROCEDURE — 99213 OFFICE O/P EST LOW 20 MIN: CPT | Performed by: FAMILY MEDICINE

## 2024-11-14 RX ORDER — ZOLPIDEM TARTRATE 12.5 MG/1
TABLET, FILM COATED, EXTENDED RELEASE ORAL
Qty: 90 TABLET | Refills: 1 | Status: SHIPPED | OUTPATIENT
Start: 2024-11-14

## 2024-11-14 NOTE — PROGRESS NOTES
Cesar Wray is a 69 year old male who presents for a complete physical exam.     had concerns including Physical (Annual ).   No topic due editable text      Subjective:    He complains of more stress. Worse sleep, family stressors, selling company and more stressors. .   Still with lymphedema, needs new CPAP device.     Tobacco:  He has never smoked tobacco.     Wt Readings from Last 4 Encounters:   11/14/24 201 lb (91.2 kg)   10/18/24 202 lb 6.4 oz (91.8 kg)   10/15/24 206 lb (93.4 kg)   09/11/24 213 lb (96.6 kg)     Body mass index is 29.68 kg/m².     The ASCVD Risk score (Kristin PAIZ, et al., 2019) failed to calculate for the following reasons:    The valid total cholesterol range is 130 to 320 mg/dL    Chief Complaint Reviewed and Verified  Nursing Notes Reviewed and   Verified  Tobacco Reviewed  Allergies Reviewed  Medications Reviewed    Problem List Reviewed  Medical History Reviewed  Surgical History   Reviewed  Family History Reviewed          His family history includes Benign breast biopsy in his sister; Benign breast biopsy (age of onset: 45) in his sister; Benign breast biopsy (age of onset: 50) in his sister; Breast Cancer in his paternal aunt; Breast Cancer (age of onset: 50) in his sister; Breast Cancer (age of onset: 59) in his self; Breast Cancer (age of onset: 75) in his mother; Cancer in his maternal uncle; Cancer (age of onset: 51) in his sister; Cancer (age of onset: 59) in his self; Diabetes in his father; Gastro-Intestinal Disorder in his father; Gout in his brother; Heart Disease in his mother; Neurological Disorder in his father; Other in his father.   He  reports that he has never smoked. He has never used smokeless tobacco. He reports that he does not currently use alcohol. He reports that he does not use drugs.    Exercise: none.  Diet: doesn't watch    Health Maintenance   Topic Date Due    PSA  10/20/2024      Last Dexa Scan:    XR DEXA BONE DENSITOMETRY (CPT=77080)  08/16/2024     Health Maintenance   Topic Date Due    Colorectal Cancer Screening  05/05/2028      No recommendations at this time   Health Maintenance Due   Topic Date Due    Fall Risk Screening (Annual)  01/01/2024    COVID-19 Vaccine (7 - 2024-25 season) 09/01/2024    Annual Physical  11/01/2024    PSA  10/20/2024    Pneumococcal Vaccine: 65+ Years (4 of 4 - PPSV23 or PCV20) 10/07/2025         Review of Systems   Constitutional: Negative.  Negative for activity change, appetite change, chills and fever.   HENT: Negative.     Eyes: Negative.    Respiratory: Negative.  Negative for shortness of breath.    Cardiovascular: Negative.  Negative for chest pain and palpitations.   Gastrointestinal: Negative.  Negative for abdominal pain.   Genitourinary: Negative.  Negative for dysuria.   Musculoskeletal:  Negative for arthralgias.   Skin: Negative.  Negative for rash.   Allergic/Immunologic: Negative.    Neurological: Negative.         Results:    Lab Results   Component Value Date/Time    WBC 3.5 (L) 08/15/2024 08:31 AM    HGB 14.8 08/15/2024 08:31 AM    .0 (L) 08/15/2024 08:31 AM      Lab Results   Component Value Date/Time     (H) 08/15/2024 08:31 AM     08/15/2024 08:31 AM    K 4.0 08/15/2024 08:31 AM     08/15/2024 08:31 AM    CO2 27.0 08/15/2024 08:31 AM    CREATSERUM 1.00 08/15/2024 08:31 AM    CREATSERUM 1.00 08/15/2024 08:31 AM    CA 9.1 08/15/2024 08:31 AM    CA 9.4 08/15/2024 08:31 AM    ALB 4.5 08/15/2024 08:31 AM    TP 6.6 08/15/2024 08:31 AM    ALKPHO 74 08/15/2024 08:31 AM    AST 35 (H) 08/15/2024 08:31 AM    ALT 37 08/15/2024 08:31 AM    BILT 2.2 (H) 08/15/2024 08:31 AM    TSH 2.449 08/15/2024 08:31 AM    T4F 1.2 08/15/2024 08:31 AM        Lab Results   Component Value Date/Time    CHOLEST 128 10/20/2023 08:08 AM    HDL 54 10/20/2023 08:08 AM    TRIG 68 10/20/2023 08:08 AM    LDL 60 10/20/2023 08:08 AM    NONHDLC 74 10/20/2023 08:08 AM       Last A1c value was 5.6% done  10/20/2023.     8/15/2024: Vitamin D, 25OH, Total 35.7       Objective:    EXAM:  /86   Pulse 70   Resp 12   Ht 5' 9\" (1.753 m)   Wt 201 lb (91.2 kg)   BMI 29.68 kg/m²  Estimated body mass index is 29.68 kg/m² as calculated from the following:    Height as of this encounter: 5' 9\" (1.753 m).    Weight as of this encounter: 201 lb (91.2 kg).   Physical Exam  Vitals and nursing note reviewed.   Constitutional:       General: He is not in acute distress.     Appearance: Normal appearance.   HENT:      Head: Normocephalic and atraumatic.      Right Ear: Tympanic membrane and external ear normal.      Left Ear: Tympanic membrane and external ear normal.      Nose: Nose normal.      Mouth/Throat:      Mouth: Mucous membranes are moist.   Eyes:      Extraocular Movements: Extraocular movements intact.      Pupils: Pupils are equal, round, and reactive to light.   Cardiovascular:      Rate and Rhythm: Normal rate and regular rhythm.      Pulses: Normal pulses.           Carotid pulses are 2+ on the right side and 2+ on the left side.       Radial pulses are 2+ on the right side and 2+ on the left side.        Dorsalis pedis pulses are 2+ on the right side and 2+ on the left side.        Posterior tibial pulses are 2+ on the right side and 2+ on the left side.      Heart sounds: Normal heart sounds, S1 normal and S2 normal. No murmur heard.  Pulmonary:      Effort: Pulmonary effort is normal.      Breath sounds: Normal breath sounds.   Abdominal:      General: Abdomen is flat. Bowel sounds are normal. There is no distension.      Palpations: Abdomen is soft.   Musculoskeletal:         General: Normal range of motion.      Cervical back: Normal range of motion and neck supple.      Right lower leg: No edema.      Left lower leg: No edema.   Skin:     General: Skin is warm and dry.      Capillary Refill: Capillary refill takes less than 2 seconds.   Neurological:      General: No focal deficit present.      Mental  Status: He is alert and oriented to person, place, and time.   Psychiatric:         Mood and Affect: Mood normal.         Behavior: Behavior normal.         Thought Content: Thought content normal.      Lymphedema right side.    Assessment & Plan:    Cesar Wray is a 69 year old male who presents for a complete physical exam.   Pt's weight is Body mass index is 29.68 kg/m²., recommended low fat diet and aerobic exercise 30 minutes three times weekly.   Health maintenance, Up to date    Immunizations: Up to date   Immunization History   Administered Date(s) Administered    Covid-19 Vaccine Pfizer 30 mcg/0.3 ml 02/15/2021, 03/15/2021, 10/06/2021    Covid-19 Vaccine Pfizer Bivalent 30mcg/0.3mL 11/05/2022, 05/13/2023    Covid-19 Vaccine Pfizer Dario-Sucrose 30 mcg/0.3 ml 05/19/2022    FLULAVAL 6 months & older 0.5 ml Prefilled syringe ([id number]) 10/07/2020    FLUZONE 6 months and older PFS 0.5 ml (53045) 10/01/2023    Fluvirin, 3 Years & >, Im 09/17/2015    HEP B, Adult 10/03/2019, 01/27/2020, 08/07/2020    Hep A, Adult 10/03/2019    Influenza 10/04/2013, 09/11/2014, 09/22/2015, 09/21/2016, 09/27/2017, 09/18/2018, 10/15/2019, 10/14/2022    Pneumococcal (Prevnar 13) 09/28/2018    Pneumococcal Conjugate PCV20 11/14/2024    Pneumovax 23 09/15/2019, 10/07/2020    RSV, recombinant, RSVpreF, adjuvanted (Arexvy) 11/01/2023    TDAP 02/07/2012, 07/23/2022    Zoster Vaccine Recombinant Adjuvanted (Shingrix) 04/07/2021, 04/06/2023         Pt info given for: exercise, low fat diet, The patient indicates understanding of these issues and agrees to the plan.  The patient is asked to return for CPX in 1 years.    Assessment & Plan  Annual physical exam         Chronic obstructive pulmonary disease, unspecified COPD type (HCC)  Stable, continue present management and continue to monitor for progression on inhalers.    still with PND but no inhlaers.     Orders:    Detailed, Mod Complex ([id number])    SVT (supraventricular tachycardia)  (HCC)  Stable, continue present management and continue to monitor for progression          Selective IgM deficiency (HCC)  Stable, continue present management and continue to monitor for progression per hematology   on Dist 203 CPX       Thrombocytopenia (HCC)  Last Platelet value was 148 done 8/15/2024. Lowest level in the last 10 years was 119. Stable, continue present management and continue to monitor for progression          Primary hypertension  BP shows borderline control with last BP of 130/86. Work on lifestyle changes, diet, exercise and weight management.   8/15/2024: Potassium 4.0; Creatinine 1.00; Creatinine 1.00; eGFR-Cr 82       Orders:    Evin, Mod Complex ([id number])    Mixed hyperlipidemia  Cholesterol shows Good control. Long term heart-healthy diet and lifestyle discussed and encouraged to reduce risk of cardiovascular disease.  10/20/2023: Cholesterol, Total 128; HDL Cholesterol 54; Triglycerides 68; LDL Cholesterol 60  Cholesterol Meds: atorvastatin Tabs - 80 MG  stable  Continue with current treatment plan     Orders:    Evin Mod Complex ([id number])    NAFLD (nonalcoholic fatty liver disease)  Stable, continue present management and continue to monitor for progression   Still normal.   Orders:    Evin Mod Complex ([id number])    Psoriasis  Stable, continue present management and continue to monitor for progression          Primary insomnia  Stable, continue present management and continue to monitor for progression     Orders:    Zolpidem Tartrate ER; TAKE ONE TABLET BY MOUTH NIGHTLY AS NEEDED FOR SLEEP  Dispense: 90 tablet; Refill: 1    PRICILA on CPAP  Stable, continue present management and continue to monitor for progression   Needs new device. Will get pulm eval.  Orders:    Pulmonary Referral - In Network    Detailed, Mod Complex ([id number])    Lymphedema  Stable, continue present management and continue to monitor for progression on compression and massage.         History of prostate  cancer  Stable, continue present management and continue to monitor for progression, Prostate CA diagnosis 2014, davinci prostatectomy at [facility] 1/2015, mild incontinence since surgery         History of breast cancer in male  Stable, continue present management and continue to monitor for progression           I am having Mr. Gumaro WHIPPLE Kamala maintain his Vitamin D, One-Daily Multi Vitamins, atorvastatin, pantoprazole, azelastine, Zoryve, Sildenafil Citrate, ascorbic acid, cyanocobalamin, colchicine, and Zolpidem Tartrate ER.     Return in about 6 months (around 5/14/2025) for recheck.

## 2024-11-15 NOTE — PATIENT INSTRUCTIONS
Cesar Wray's SCREENING SCHEDULE   Tests on this list are recommended by your physician but may not be covered, or covered at this frequency, by your insurer.   Please check with your insurance carrier before scheduling to verify coverage.   PREVENTATIVE SERVICES FREQUENCY &  COVERAGE DETAILS LAST COMPLETION DATE   Diabetes Screening    Fasting Blood Sugar / Glucose    One screening every 12 months if never tested or if previously tested but not diagnosed with pre-diabetes   One screening every 6 months if diagnosed with pre-diabetes Lab Results   Component Value Date     (H) 08/15/2024        Cardiovascular Disease Screening    Lipid Panel  Cholesterol  Lipoprotein (HDL)  Triglycerides Covered every 5 years for all Medicare beneficiaries without apparent signs or symptoms of cardiovascular disease Lab Results   Component Value Date    CHOLEST 128 10/20/2023    HDL 54 10/20/2023    LDL 60 10/20/2023    LDLD 60 09/18/2018    TRIG 68 10/20/2023         Electrocardiogram (EKG)   Covered if needed at Welcome to Medicare, and non-screening if indicated for medical reasons 05/08/2023      Ultrasound Screening for Abdominal Aortic Aneurysm (AAA) Covered once in a lifetime for one of the following risk factors   • Men who are 65-75 years old and have ever smoked   • Anyone with a family history -     Colorectal Cancer Screening  Covered for ages 50-85; only need ONE of the following:    Colonoscopy   Covered every 10 years    Covered every 2 years if patient is at high risk or previous colonoscopy was abnormal 05/05/2023    Health Maintenance   Topic Date Due   • Colorectal Cancer Screening  05/05/2028       Flexible Sigmoidoscopy   Covered every 4 years -    Fecal Occult Blood Test Covered annually -   Prostate Cancer Screening    Prostate-Specific Antigen (PSA) Annually Lab Results   Component Value Date    PSA 5.530 (H) 04/23/2014     Health Maintenance   Topic Date Due   • PSA  10/20/2024       Immunizations    Influenza Covered once per flu season  Please get every year 10/15/2024  No recommendations at this time    Pneumococcal Each vaccine ([id number] & [id number]) covered once after 65 Prevnar 13: 09/28/2018    Qyqfigtwo19: 10/07/2020     No recommendations at this time    Hepatitis B One screening covered for patients with certain risk factors   08/07/2020  No recommendations at this time    Tetanus Toxoid Not covered by Medicare Part B unless medically necessary (cut with metal); may be covered with your pharmacy prescription benefits -    Tetanus, Diptheria and Pertusis TD and TDaP Not covered by Medicare Part B -  No recommendations at this time    Zoster Not covered by Medicare Part B; may be covered with your pharmacy  prescription benefits -  No recommendations at this time     Chronic Obstructive Pulmonary Disease (COPD)    Spirometry Annually Spirometry date:

## 2024-11-19 NOTE — TELEPHONE ENCOUNTER
Faxed signed order for compression glove garment, and sleeve to [facility] . Yes

## 2024-11-26 ENCOUNTER — OFFICE VISIT (OUTPATIENT)
Facility: CLINIC | Age: 69
End: 2024-11-26
Payer: COMMERCIAL

## 2024-11-26 VITALS
RESPIRATION RATE: 16 BRPM | OXYGEN SATURATION: 96 % | HEART RATE: 66 BPM | DIASTOLIC BLOOD PRESSURE: 78 MMHG | BODY MASS INDEX: 30.51 KG/M2 | TEMPERATURE: 98 F | SYSTOLIC BLOOD PRESSURE: 120 MMHG | HEIGHT: 69 IN | WEIGHT: 206 LBS

## 2024-11-26 DIAGNOSIS — G47.19 DAYTIME HYPERSOMNOLENCE: ICD-10-CM

## 2024-11-26 DIAGNOSIS — G47.33 OSA (OBSTRUCTIVE SLEEP APNEA): Primary | ICD-10-CM

## 2024-11-26 PROCEDURE — 3074F SYST BP LT 130 MM HG: CPT | Performed by: INTERNAL MEDICINE

## 2024-11-26 PROCEDURE — 3008F BODY MASS INDEX DOCD: CPT | Performed by: INTERNAL MEDICINE

## 2024-11-26 PROCEDURE — 99204 OFFICE O/P NEW MOD 45 MIN: CPT | Performed by: INTERNAL MEDICINE

## 2024-11-26 PROCEDURE — 3078F DIAST BP <80 MM HG: CPT | Performed by: INTERNAL MEDICINE

## 2024-11-26 NOTE — PROGRESS NOTES
Pulmonary/Critical Care/Sleep Medicine    Consult Note     PCP: Ramón Alex MD   Phone: [phone]   Fax: [phone]          Chief Complaint   Patient presents with    New Patient     Pt here for sleep consult.  Pt states no issues at this time and machine is more than five years old. Sleep questionnaire: 0/24.       HPI  I had the pleasure of seeing Gumaro Wray who is a pleasant 69 year old male who presents for evaluation of PRICILA      The patient states that he has snored at home in past and was noted to have witnessed apnea , he was diagnosed with PRICILA 6-7 years ago and has been on CPAP machine, its motor states that it ia end of its life, so he presents for sleep evaluation.      He states bed time around 930-10  PM . It takes 15 min to 3 hour to fall asleep so takes Ambien every night that works most times and leaves bed around 530 -6 AM. He wakes up occ at  night.  He is sleepy and fatigued during the daytime.  He admits to tossing and turning at night.  He denies nightmares, sleep talking or sleep walking.  He admits to occasional  AM headaches.  He denies symptoms sleep attack     The patient denies kicking legs at night. Denies teeth grinding.       He drinks 2 cups of caffeine coffee daily,  and  1 caffeine cans of soda daily.       He has pets 1 dog  that does not sleep in bed.       Hx of tobacco use: He  reports that he has never smoked. He has never used smokeless tobacco.    Past Medical History:    Actinic keratosis    left wrist    Actinic keratosis    right forearm    Actinic keratosis    left neck    Actinic keratosis    left forearm    Anesthesia complication    HARD TO AWAKEN AFTER PROSTECTOMY    Arthritis    Atelectasis of both lungs    Basal cell carcinoma    left upper arm    Basal cell carcinoma    right shoulder; left forearm    Basal cell carcinoma    right forehead    Breast cancer (HCC)    right breast     Breast cancer in male (HCC)    Right    Calculus of kidney    left  lower lobe     COPD (chronic obstructive pulmonary disease) (HCC)    Esophageal reflux    High blood pressure    High cholesterol    Hyperlipidemia    Incontinence    bladder     Lymphedema of arm    right; wears sleeve; NO BP/IV to right arm     Malignant melanoma (HCC)    left calf    FRIEDMAN (nonalcoholic steatohepatitis)    Dr Louis ; from tamoxifen - stable    Osteoarthritis    Personal history of arthritis    Prostate cancer (HCC)    [facility]    Sleep apnea    CPAP    Squamous cell carcinoma    in-situ, mid upper forehead    Squamous cell carcinoma    in-situ, mid upper chest    Squamous cell carcinoma    in-situ, left upper arm; in-situ, left forearm    Squamous cell carcinoma    in-situ, right posterior neck; in-situ, right upper arm    Visual impairment    glasses      Past Surgical History:   Procedure Laterality Date    Colonoscopy N/A 12/05/2016    Procedure: COLONOSCOPY;  Surgeon: Cole Storey MD;  Location:  ENDOSCOPY    Colonoscopy  12/15/2018    Colonoscopy & polypectomy  12/05/2016    small polyp at prior polypectomy site     Foot/toes surgery proc unlisted  12/2012    Tarsil fusion, 8 screws and 21 plate    Hand/finger surgery unlisted      surgery of the thumb    Laparoscopy, surgical prostatectomy, retropubic radical, w/nerve sparing  01/2015    Leg/ankle surgery proc unlisted  1978 1989    first left then right    Mastectomy right  12/15/2015    SENTINEL AND AXILLARY LYMPH NODE REMOVAL, Do not use right arm for IV/BP    Other surgical history  01/2015    radical prostatectomy    Other surgical history Left 09/2022    melanoma removal calf    Other surgical history Left 10/2022    melanoma removal calf    Repair of biceps tendon at elbow  6/20012    Dr Reyes at [facility]    Repair rotator cuff,chronic      Shoulder surg proc unlisted Bilateral 2004/2012    right shoulder/left    Skin surgery Right 06/2022    shoulder skin cancer removal    Tonsillectomy       Allergies[1]  Current  Outpatient Medications   Medication Sig Dispense Refill    Zolpidem Tartrate ER 12.5 MG Oral Tab CR TAKE ONE TABLET BY MOUTH NIGHTLY AS NEEDED FOR SLEEP 90 tablet 1    COLCHICINE 0.6 MG Oral Tab Take 1 tablet by mouth daily. 90 tablet 0    ascorbic acid 100 MG Oral Tab Vitamin C 100 mg tablet, [RxNorm: 335890]      cyanocobalamin 1000 MCG Oral Tab cyanocobalamin (vit B-12) 1,000 mcg tablet, [RxNorm: 402620]      SILDENAFIL CITRATE 100 MG Oral Tab TAKE 1 TABLET BY MOUTH EVERY DAY AS NEEDED 30 tablet 1    Roflumilast (ZORYVE) 0.3 % External Cream Apply 1 Application topically daily. 60 g 3    azelastine 0.1 % Nasal Solution INHALE 2 SPRAYS NASALLY 2 TIMES DAILY      pantoprazole 40 MG Oral Tab EC Take 1 tablet (40 mg total) by mouth 2 (two) times daily.      atorvastatin 80 MG Oral Tab Take 1 tablet (80 mg total) by mouth daily. 90 tablet 3    Multiple Vitamin (ONE-DAILY MULTI VITAMINS) Oral Tab Take 1 tablet by mouth daily.      Cholecalciferol (VITAMIN D) 50 MCG (2000 UT) Oral Cap Take by mouth.        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, [employer] Methyl ethyl ketone acetone exposure   Tobacco Use    Smoking status: Never    Smokeless tobacco: Never   Vaping Use    Vaping status: Never Used   Substance and Sexual Activity    Alcohol use: Yes     Comment: Social    Drug use: No   Other Topics Concern    Caffeine Concern Yes     Comment: coffee once a week 1 soda a day    Sleep Concern No    Exercise No    Seat Belt Yes      Immunization History   Administered Date(s) Administered    Covid-19 Vaccine Pfizer 30 mcg/0.3 ml 02/15/2021, 03/15/2021, 10/06/2021    Covid-19 Vaccine Pfizer Bivalent 30mcg/0.3mL 11/05/2022, 05/13/2023    Covid-19 Vaccine Pfizer Dario-Sucrose 30 mcg/0.3 ml 05/19/2022    FLULAVAL 6 months & older 0.5 ml Prefilled syringe  (47419) 10/07/2020    FLUZONE 6 months and older PFS 0.5 ml (00361) 10/01/2023    Fluvirin, 3 Years & >, Im 09/17/2015    HEP B, Adult 10/03/2019, 01/27/2020, 08/07/2020    Hep A, Adult 10/03/2019    Influenza 10/04/2013, 09/11/2014, 09/22/2015, 09/21/2016, 09/27/2017, 09/18/2018, 10/15/2019, 10/14/2022, 10/15/2024    Pneumococcal (Prevnar 13) 09/28/2018    Pneumococcal Conjugate PCV20 11/14/2024    Pneumovax 23 09/15/2019, 10/07/2020    RSV, recombinant, RSVpreF, adjuvanted (Arexvy) 11/01/2023    TDAP 02/07/2012, 07/23/2022    Zoster Vaccine Recombinant Adjuvanted (Shingrix) 04/07/2021, 04/06/2023      Family History   Problem Relation Age of Onset    Breast Cancer Sister 50        uterine ca after tamoxifen tx (dx 51)    Cancer Sister 51        uterine ca (due to tamoxifen)    Other (Benign breast biopsy) Sister 50    Gastro-Intestinal Disorder Father         Ulcers    Diabetes Father     Neurological Disorder Father         Parkinson's Disease    Other (Other) Father     Heart Disease Mother     Breast Cancer Mother 75        d.82    Other (Benign breast biopsy) Sister 45    Other (Benign breast biopsy) Sister     Breast Cancer Paternal Aunt         d.92    Cancer Maternal Uncle         brain ca; d.40's    Other (Gout) Brother     Cancer Self 59        prostate    Breast Cancer Self 60        invasive ductal ca        Review of Systems   Constitutional:  Positive for fatigue. Negative for fever and unexpected weight change.   HENT:  Negative for congestion, mouth sores, nosebleeds, postnasal drip, rhinorrhea, sore throat and trouble swallowing.    Eyes:  Negative for visual disturbance.   Respiratory:  Positive for cough. Negative for apnea, choking, chest tightness, shortness of breath and wheezing.    Cardiovascular:  Negative for chest pain, palpitations and leg swelling.   Gastrointestinal:  Negative for abdominal pain, constipation, diarrhea, nausea and vomiting.   Genitourinary:  Negative for difficulty  urinating.   Musculoskeletal:  Negative for arthralgias, back pain, gait problem and myalgias.   Neurological:  Negative for dizziness, weakness and headaches.   Psychiatric/Behavioral:  Negative for sleep disturbance.         Vitals:    11/26/24 1107   BP: 120/78   Pulse: 66   Resp: 16   Temp: 97.5 °F (36.4 °C)      SpO2: 96 %  Ht Readings from Last 1 Encounters:   11/26/24 5' 9\" (1.753 m)     Wt Readings from Last 1 Encounters:   11/26/24 206 lb (93.4 kg)     Body mass index is 30.42 kg/m².     Physical Exam  Constitutional:       General: He is not in acute distress.     Appearance: Normal appearance. He is not ill-appearing or diaphoretic.   HENT:      Head: Normocephalic and atraumatic.      Nose: Nose normal. No congestion or rhinorrhea.      Mouth/Throat:      Mouth: Mucous membranes are moist.      Pharynx: Oropharynx is clear. No oropharyngeal exudate or posterior oropharyngeal erythema.      Comments: Mallampati class IV palate   Eyes:      Extraocular Movements: Extraocular movements intact.      Pupils: Pupils are equal, round, and reactive to light.   Cardiovascular:      Rate and Rhythm: Normal rate.      Pulses: Normal pulses.      Heart sounds: Normal heart sounds. No murmur heard.  Pulmonary:      Effort: Pulmonary effort is normal. No respiratory distress.      Breath sounds: Normal breath sounds. No wheezing or rhonchi.   Chest:      Chest wall: No tenderness.   Abdominal:      General: Abdomen is flat. Bowel sounds are normal.      Palpations: Abdomen is soft.   Musculoskeletal:         General: Normal range of motion.   Skin:     General: Skin is warm.   Neurological:      General: No focal deficit present.      Mental Status: He is alert and oriented to person, place, and time.   Psychiatric:         Mood and Affect: Mood normal.         Behavior: Behavior normal.         Thought Content: Thought content normal.         Judgment: Judgment normal.             Labs:  Last BMP  Lab Results    Component Value Date     (H) 08/15/2024    BUN 15 08/15/2024    CREATSERUM 1.00 08/15/2024    CREATSERUM 1.00 08/15/2024    BUNCREA 15.9 11/28/2022    ANIONGAP 4 08/15/2024    GFRAA 91 07/20/2022    GFRNAA 79 07/20/2022    CA 9.1 08/15/2024    CA 9.4 08/15/2024     08/15/2024    K 4.0 08/15/2024     08/15/2024    CO2 27.0 08/15/2024    OSMOCALC 289 08/15/2024      Last CBC  Lab Results   Component Value Date    WBC 3.5 (L) 08/15/2024    RBC 4.52 08/15/2024    HGB 14.8 08/15/2024    HCT 42.2 08/15/2024    MCV 93.4 08/15/2024    MCH 32.7 08/15/2024    MCHC 35.1 08/15/2024    RDW 12.8 08/15/2024    .0 (L) 08/15/2024      Last CMP  Lab Results   Component Value Date     (H) 08/15/2024    BUN 15 08/15/2024    BUNCREA 15.9 11/28/2022    CREATSERUM 1.00 08/15/2024    CREATSERUM 1.00 08/15/2024    ANIONGAP 4 08/15/2024    GFR 70 08/09/2017    GFRNAA 79 07/20/2022    GFRAA 91 07/20/2022    CA 9.1 08/15/2024    CA 9.4 08/15/2024    OSMOCALC 289 08/15/2024    ALKPHO 74 08/15/2024    AST 35 (H) 08/15/2024    ALT 37 08/15/2024    BILT 2.2 (H) 08/15/2024    TP 6.6 08/15/2024    ALB 4.5 08/15/2024    GLOBULIN 2.1 08/15/2024    AGRATIO 2.2 (H) 06/04/2011     08/15/2024    K 4.0 08/15/2024     08/15/2024    CO2 27.0 08/15/2024      Last Thyroid Function  Lab Results   Component Value Date    T4F 1.2 08/15/2024    TSH 2.449 08/15/2024        Imaging:     PROCEDURE:  CT CHEST(CONTRAST ONLY) (CPT=71260) personally reviewed      LOCATION:  [facility]       COMPARISON:  [facility], XR, XR CHEST PA + LAT CHEST (CPT=71046), 7/26/2022, 6:30 PM.  [facility], XR, XR RIBS WITH CHEST (3 VIEWS), RIGHT  (CPT=71101), 11/11/2019, 9:06 AM.  [facility] , XR, XR CHEST AP PORTABLE  (CPT=71045), 5/08/2023, 12:36 PM.     INDICATIONS:  R91.1 Left upper lobe pulmonary nodule     TECHNIQUE:  CT images were obtained with non-ionic intravenous contrast material. Dose reduction techniques were used. Dose information is  transmitted to the ACR (American College of Radiology) NRDR (National Radiology Data Registry) which includes the  Dose Index Registry.     PATIENT STATED HISTORY:(As transcribed by Technologist)  Left upper lobe pulmonary nodule. History of mastectomy.      CONTRAST USED:  75cc of Isovue 370     FINDINGS:    LUNGS:  No visible pulmonary disease.    VASCULATURE:  No visible pulmonary arterial thrombus or attenuation.    ERIC:  No mass or adenopathy.    MEDIASTINUM:  No mass or adenopathy.    CARDIAC:  No enlargement, pericardial thickening, or significant coronary artery calcification.  PLEURA:  No mass or effusion.    THORACIC AORTA:  No aneurysm.    CHEST WALL:  No mass or axillary adenopathy.    LIMITED ABDOMEN:  Limited images of the upper abdomen are unremarkable.    BONES:  No bony lesion or fracture.                     Impression   CONCLUSION:  No suspicious nodule, mass or consolidation.  Previously suggested possible calcified granuloma in the left lung apex was artifactual.        Dictated by (CST): Rj Estrada MD on 5/19/2023 at 8:06 AM             [facility]       Accredited by the American Academy of Sleep Medicine (AASM)     PATIENT'S NAME:        LILA MARTIN  ATTENDING PHYSICIAN:   Kj Quach M.D.  REFERRING PHYSICIAN:   Ramón Alex M.D.  PATIENT ACCOUNT #:     [id number]        LOCATION:       [facility]  MEDICAL RECORD #:      [id number]        YOB: 1955  DATE OF STUDY:         06/11/2017     SLEEP STUDY REPORT     STUDY 6/15/2017 TYPE:  CPAP titration.     PATIENT DEMOGRAPHICS:  Patient height is 5 feet 10 inches.  Patient weight is 212 pounds.  Body mass index is 30.7.  ICD-10 code is G47.33.  Warren score 0.     CLINICAL HISTORY:  The patient is a 61-year-old male with history of mild obstructive sleep apnea diagnosed on previous overnight polysomnography.  The apnea-hypopnea index was 10.2, the supine apnea-hypopnea index was 35.2, with oxygen  desaturation minesh of 77%.  The patient now presents for CPAP titration.     CPAP TITRATION RECORDING PARAMETER:  The patient underwent a formal CPAP titration evaluation at the [facility]. The study was acquired in compliance with the AASM Manual for the Scoring of Sleep and Associated Events. The following parameters were monitored: EEG, EOG, ECG, chin EMG, left and right tibialis EMG, snore microphone, respiratory inductance plethysmography, and oxygen saturation via continuous pulse oximetry. In accordance with AASM recommendations, hypopnea events are scored based on an oxygen saturation more than or equal to 4 percent. Body position is documented via technician notes every 15 minutes. There is an additional channel for measurement of CPAP flow for the titration of positive airway pressure.     SLEEP CONTINUITY AND SLEEP ARCHITECTURE:  Total recording time was 453 minutes, total sleep time was 403 minutes for a sleep efficiency of 89%, which is within expected norms.  Sleep onset latency was increased at 30.3 minutes.  Wake after sleep onset was normal at 13 minutes.  The amount of stage 1 or light sleep was seen in decreased amounts as it was not observed during this night's study.  Slow-wave sleep was seen in decreased amounts comprising 10.3% of sleep.  REM sleep was seen in decreased amounts comprising 16.5% of sleep.  Sleep was not significantly fragmented during this night's study.     RESPIRATORY MEASURES:  Respiratory monitoring showed resolution of obstructive apneas and hypopneas at a pressure of 10 cm of water.  Supine REM sleep was briefly observed at the final pressure.  Oxygen saturations at this pressure remained greater than 90%.     ECG:  ECG demonstrated sinus rhythm throughout.     PERIODIC LIMB MOVEMENTS:  Periodic limb movements occurred frequently during this study but did not significantly disrupt sleep.  The periodic limb movement index was 32.  The periodic limb movement index  with arousals was 2.2.     EEG:  With the limited montage recorded, no EEG abnormalities were observed.     IMPRESSION:  Nasal CPAP titration was performed, and the patient's sleep-disordered breathing was effectively treated.     RECOMMENDATIONS:  Nasal CPAP at a pressure of 10 cm of water is recommended.  The patient used a ResMed AirFit P10 medium size mask with heated humidity of 5 and an expiatory pressure release of 3.  Close clinical followup will be needed to ensure treatment compliance and remission of daytime impairment.  The patient should avoid the use of alcohol and sedating medications at bedtime and engage in a formal weight loss program.  The patient should avoid driving while sleepy.  Based on ordering physician's request, we will defer notification and followup of these results to their office.  If requested, we would be happy to see the patient in the [facility] to help arrange for treatment and followup.     Dictated By Kj Quach M.D.  d:06/15/2017 15:47:40      [facility]       Accredited by the American Academy of Sleep Medicine (AASM)     PATIENT'S NAME:        LILA MARTIN  ATTENDING PHYSICIAN:   Kj Quach M.D.  REFERRING PHYSICIAN:   Ramón Alex M.D.  PATIENT ACCOUNT #:     [id number]        LOCATION:       [facility]  MEDICAL RECORD #:      [id number]        YOB: 1955  DATE OF STUDY:         04/11/2017     SLEEP STUDY REPORT     STUDY TYPE:  Overnight polysomnography.     PATIENT DEMOGRAPHICS:  Patient height is 5 feet 10 inches.  Patient weight is 212 pounds.  Body mass index is 30.7.  ICD 10 code is G47.33.  [place] Score 0.     CLINICAL HISTORY:  Patient is a 61-year-old male with history of hypertension and difficulty sleeping who presents for evaluation of obstructive sleep apnea.       DIAGNOSTIC RECORDING PARAMETERS:  The patient underwent a formal polysomnographic evaluation at the [facility]. The study was  acquired in compliance with the AASM Manual for the Scoring of Sleep and Associated Events. The following parameters were monitored: EOG, EEG, ECG, chin EMG, left and right tibialis EMG, snore microphone, an oronasal thermal sensor, nasal pressure transducer, respiratory inductance plethysmography, and oxygen saturation via continuous pulse oximetry. In accordance with AASM recommendations, hypopnea events are scored based on an oxygen saturation more than or equal to 4 percent.  Body position is documented via technician notes every 15 minutes.      SLEEP CONTINUITY AND SLEEP ARCHITECTURE:  Total recording time was 416 minutes, total sleep time was 340 minutes for a sleep efficiency of 82% which is slightly decreased as compared to expected norms.  Sleep onset latency was increased at 22 minutes.  Wake after sleep onset was slightly increased at 55 minutes.  Sleep was fragmented during this night's study due to respiratory and idiopathic events.  The amount of stage 1, or light sleep, was seen in decreased amounts comprising 0.3% of sleep.  Slow wave sleep was absent during this study.  REM sleep was seen in decreased amounts comprising 16.6% of sleep.       RESPIRATORY MEASURES:  Respiratory monitoring showed obstructive apneas and hypopneas that were worse in the supine position.  The apnea-hypopnea index was 10.2.  The supine apnea-hypopnea index was 35.  The non-supine apnea-hypopnea index was 7.  Sleep-disordered breathing resulted in moderate oxyhemoglobin desaturations with a minesh of 77%.      ECG:  ECG demonstrated sinus rhythm throughout.       PERIODIC LIMB MOVEMENTS:  Periodic limb movements were occasionally observed during this study, but did not significantly disrupt sleep.  The periodic limb movement index was 18.  The periodic limb movement index with arousals was 4.     EEG:  With limited montage recorded, no EEG abnormalities were observed.       IMPRESSION:  This study along with clinical  history is consistent with mild obstructive sleep apnea-hypopnea syndrome.     RECOMMENDATIONS:  Clinical correlation is recommended.  Treatment options include conservative management with behavioral/positional therapy.  Further options for the treatment of this patient's obstructive sleep apnea include a trial of nasal CPAP, evaluation for an oral appliance, or evaluation for upper airway surgery.  Per the ordering physician's request, we will defer notification and management of this patient's obstructive sleep apnea to the ordering physician.  The patient should avoid the use of alcohol and sedating medications at bedtime and engage in a formal weight loss program.  The patient should avoid driving while sleepy.       Dictated By Kj Quach M.D.  d:2017 17:01:19      Cesar Wray  2024 - 2024  : 10/05/1955  Age: 69 years  [address] [place]  [address]  [address]  [facility], [address]  Compliance Report  Usage 2024 - 2024  Usage days 90/90 days (100%)  >= 4 hours 87 days (97%)  < 4 hours 3 days (3%)  Usage hours 674 hours 29 minutes  Average usage (total days) 7 hours 30 minutes  Average usage (days used) 7 hours 30 minutes  Median usage (days used) 7 hours 24 minutes  AirSense 10 Elite  Serial number [id number]  Mode CPAP  Set pressure 10 cmH2O  EPR Fulltime  EPR level 3  Therapy  Leaks - L/min Median: 1.5 95th percentile: 20.7 Maximum: 33.5  Events per hour AI: 2.0 HI: 0.2 AHI: 2.2  Apnea Index Central: 0.4 Obstructive: 1.5 Unknown: 0.1  RERA Index 0.0  Cheyne-Baumann respiration (average duration per night) 0 minutes (0%)     DEVICE EXCEEDS MOTOR LIFE. SET UP DATE 2017          [place] Sleepiness Scale: (ESS) score on today's visit is   out of 24.     Score total of 1-6    Normal sleep   Score total of 7-8    Average sleepiness   Score total of 9-24    Abnormal (possibly pathologic) sleepiness       Impression:    Obstructive sleep apnea syndrome (OSAS):  Attended sleep study performed on 4/11/2017 pnea-hypopnea index was 10.2.  The supine apnea-hypopnea index was 35.  The non-supine apnea-hypopnea index was 7.  Sleep-disordered breathing resulted in moderate oxyhemoglobin desaturations with a minesh of 77%. CPAP titration sleep study on 6/15/2017 showed  resolution of AHI with Nasal CPAP at a pressure of 10 cm of water is recommended. Download data on 11/25/2024  for 90  days shows adequate AHI and compliance   Daytime hypersomnolence/fatigue: controlled symptoms   Obesity: Class I   ;  Body mass index is 30.42 kg/m².  Insomnia: uses Ambien every night   Right breast cancer s/p Mastectomy on 12/2015  Hx malignant melanoma 9/2022 resection   Basal cell skin cancer  Actinic Keratosis   GERD  Hypertension   Hypercholesterolemia                               Plan:    Continue current CPAP 10 as patient is using and benefiting from CPAP use  Then Obtain Download data for compliance and efficacy from CPAP machine in 30 days   Advised about weight loss   Advised against drowsy driving and to avoid alcoholic beverage and respiratory depressants as these may worsen sleep apnea      Follow up: 6  months with PA and 1 year with Dr. Woodward    Thank you for allowing me to participate in your patient care.    TERRA Woodward MD, FACP, FCCP, FAA - Pulmonary/Critical care/Sleep Medicine  Please contact our office if you have any questions or concerns at [phone]    Note to the patient: The 21st Century Cures Act makes medical notes like these available to patients in the interest of transparency. However, be advised that this is a medical document. It is intended as peer to peer communication. It is written in medical language and may contain abbreviations or verbiage that are unfamiliar. It may appear blunt or direct. Medical documents are intended to carry relevant information, facts as evident, and clinical opinion of the practitioner.      Disclaimer: Components of this note  were documented using voice recognition system and are subject to errors not corrected at proofreading. Contact the author of this note for any clarifications         [1]   Allergies  Allergen Reactions    Levaquin [Levofloxacin] SWELLING     Other reaction(s): Other  Insomnia; swelling      Hibiclens RASH

## 2024-11-26 NOTE — PATIENT INSTRUCTIONS
Plan:    Continue current CPAP 10 as patient is using and benefiting from CPAP use  Then Obtain Download data for compliance and efficacy from CPAP machine in 30 days   Advised about weight loss   Advised against drowsy driving and to avoid alcoholic beverage and respiratory depressants as these may worsen sleep apnea      Follow up: 6  months with PA and 1 year with Dr. Trace Woodward MD      Obstructive Sleep Apnea  Obstructive sleep apnea is a condition caused by air passages becoming narrowed or blocked during sleep. As a result, breathing stops for short periods. Your body wakes up enough for breathing to start again. But you don't remember it. The cycle of stopped breathing and brief awakenings can repeat dozens of times a night. This prevents the body from getting to the deeper stages of sleep that are needed for good rest.   Signs of sleep apnea include loud snoring, noisy breathing, and gasping sounds during sleep. People with sleep apnea often find they use the bathroom many times during the night. Daytime symptoms include waking up tired after a full night's sleep and waking up with headaches. They can also include feeling very sleepy or falling asleep during the day, and having problems with memory or concentration.   Risk factors for sleep apnea include:  Being overweight  Being assigned male at birth, or being in menopause  Smoking  Using alcohol or sedating medicines  Having enlarged structures in the nose or throat such as enlarged tonsils or adenoids, or extra tissue in the airway  Home care  Lifestyle changes that can help treat snoring and sleep apnea include:   If you're overweight, talk with your healthcare provider about a weight-loss plan for you.  Don't drink alcohol for 3 to 4 hours before bedtime.  Don't take sedating medicines. Ask your healthcare provider about the medicines you take.  If you smoke, talk to your provider about ways to quit. It's important to stay away from  secondhand smoke. Don't use e-cigarettes because of their harmful side effects.  Sleep on your side. This can help prevent gravity from pulling relaxed throat tissues into your breathing passages.  If you have allergies or sinus problems that block your nose, ask your provider for help.  Use positive airway pressure (PAP). Discuss with your provider the benefits of using PAP at home. And talk about the type of PAP that's best for you.  Follow-up care  Follow up with your healthcare provider, or as advised. A diagnosis of sleep apnea is made with a sleep study. Your provider can tell you more about this test.   When to get medical care  See your healthcare provider if you have daytime symptoms of sleep apnea. These include:   Waking up tired after a full night's sleep  Waking up with a headache  Feeling very sleepy or falling asleep during the day  Having problems with memory or concentration  Also talk with your provider if your partner tells you that you snore, gasp for air, or stop breathing while you sleep.   Seeing your provider is important because sleep apnea can make you more likely to have certain health problems. These include high blood pressure, heart attack, stroke, and sexual dysfunction. If you have sleep apnea, talk with your healthcare provider about the best treatments for you.   [employer] last reviewed this educational content on 5/1/2022 © 2000-2023 [employer]. All rights reserved. This information is not intended as a substitute for professional medical care. Always follow your healthcare professional's instructions.        Continuous Positive Air Pressure (CPAP)     A mask over the nose gently directs air into the throat to keep the airway open.     Continuous positive air pressure (CPAP) uses gentle air pressure to hold the airway open. CPAP is often the most effective treatment for sleep apnea. It works very well as a treatment for adults diagnosed with obstructive sleep apnea  with a lot of sleepiness. But keep in mind that it can take several adjustments before the setup is right for you.   How CPAP works  The CPAP machine  is a small portable pump that sits beside the bed. The pump sends air through a hose, which is held over your nose alone, or nose and mouth by a mask. Mild air pressure is gently pushed through your airway. The air pressure nudges sagging tissues aside. This widens the airway so you can breathe better. CPAP may be combined with other kinds of therapy for sleep apnea.   Types of air pressure treatments  There are different types of CPAP. Your doctor or CPAP technician will help you decide which type is best for you:   Basic CPAP keeps the pressure constant all night long.  A bilevel device (BiPAP) provides more pressure when you breathe in and less when you breathe out. A BiPAP machine also may be set to provide automatic breaths to maintain breathing if you stop breathing while sleeping.  An autoCPAP device automatically adjusts pressure throughout the night and in response to changes such as body position, sleep stage, and snoring.  [employer] last reviewed this educational content on 7/1/2019  © 4965-1226 [employer]. All rights reserved. This information is not intended as a substitute for professional medical care. Always follow your healthcare professional's instructions.

## 2024-11-26 NOTE — PROGRESS NOTES
Cesar Wray  2024 - 2024  : 10/05/1955  Age: 69 years  [address] [place]  [address]  [address]  [facility], [address]  Compliance Report  Usage 2024 - 2024  Usage days 90/90 days (100%)  >= 4 hours 87 days (97%)  < 4 hours 3 days (3%)  Usage hours 674 hours 29 minutes  Average usage (total days) 7 hours 30 minutes  Average usage (days used) 7 hours 30 minutes  Median usage (days used) 7 hours 24 minutes  AirSense 10 Elite  Serial number [id number]  Mode CPAP  Set pressure 10 cmH2O  EPR Fulltime  EPR level 3  Therapy  Leaks - L/min Median: 1.5 95th percentile: 20.7 Maximum: 33.5  Events per hour AI: 2.0 HI: 0.2 AHI: 2.2  Apnea Index Central: 0.4 Obstructive: 1.5 Unknown: 0.1  RERA Index 0.0  Cheyne-Baumann respiration (average duration per night) 0 minutes (0%)    DEVICE EXCEEDS MOTOR LIFE. SET UP DATE 2017

## 2024-12-16 DIAGNOSIS — M19.90 INFLAMMATORY ARTHRITIS: ICD-10-CM

## 2024-12-16 RX ORDER — COLCHICINE 0.6 MG/1
0.6 TABLET ORAL DAILY
Qty: 90 TABLET | Refills: 0 | OUTPATIENT
Start: 2024-12-16

## 2024-12-16 NOTE — TELEPHONE ENCOUNTER
Future Appointments   Date Time Provider Department Center   12/19/2024  8:45 AM Jeff Cardenas DPM [id number]   12/20/2024  7:40 AM EH EUGENIO RM3 EH [facility]   2/13/2025  9:30 AM Cesar Arnold MD G&B DERM [facility]   3/12/2025 11:00 AM Ruba Richards DO EMGRHEUMHBSN EM[facility] [place]   5/15/2025  7:45 AM Ramón Alex MD EMG 3 [facility] [place]   5/28/2025  8:30 AM Jia Argueta PA-C EEMG Pulm [facility] [place]   9/10/2025  3:45 PM Willem Steele MD NP [facility]   10/14/2025  8:45 AM Cande Zimmerman MD EMGSURGONC EMG Surg/Onc   11/25/2025  9:00 AM Raj Woodward MD EEMG Pulm EM[facility] [place]     Last office visit: 8/7/2024    Last fill: 11/1/2024 90 tab, 0 refills    Sending pt my chart message that there are fills at the pharmacy

## 2024-12-19 ENCOUNTER — OFFICE VISIT (OUTPATIENT)
Dept: PODIATRY CLINIC | Facility: CLINIC | Age: 69
End: 2024-12-19
Payer: COMMERCIAL

## 2024-12-19 DIAGNOSIS — M77.41 METATARSALGIA OF RIGHT FOOT: ICD-10-CM

## 2024-12-19 DIAGNOSIS — M21.6X1 PLANTAR FAT PAD ATROPHY OF RIGHT FOOT: ICD-10-CM

## 2024-12-19 DIAGNOSIS — L84 CALLUS OF FOOT: Primary | ICD-10-CM

## 2024-12-19 PROCEDURE — 99203 OFFICE O/P NEW LOW 30 MIN: CPT | Performed by: PODIATRIST

## 2024-12-19 NOTE — PROGRESS NOTES
Cesar Wray is a 69 year old male.   Chief Complaint   Patient presents with    Foot Pain     Right foot- bottom portion- there is a little dot in the middle- rates pain 4/10          HPI:   Gentleman complains of a painful callus on the bottom of his right foot.  He pain he claims there is a little dot in the center of it.  It is painful to walk on he does have a history of melanoma as well as skin cancers.  At today's visit reviewed nurse's history as taken above, allergies medications and medical history as documented below.  All changes duly noted  Allergies: Levaquin [levofloxacin] and Hibiclens   Current Outpatient Medications   Medication Sig Dispense Refill    Zolpidem Tartrate ER 12.5 MG Oral Tab CR TAKE ONE TABLET BY MOUTH NIGHTLY AS NEEDED FOR SLEEP 90 tablet 1    COLCHICINE 0.6 MG Oral Tab Take 1 tablet by mouth daily. 90 tablet 0    ascorbic acid 100 MG Oral Tab Vitamin C 100 mg tablet, [RxNorm: 435829]      cyanocobalamin 1000 MCG Oral Tab cyanocobalamin (vit B-12) 1,000 mcg tablet, [RxNorm: 620947]      SILDENAFIL CITRATE 100 MG Oral Tab TAKE 1 TABLET BY MOUTH EVERY DAY AS NEEDED 30 tablet 1    Roflumilast (ZORYVE) 0.3 % External Cream Apply 1 Application topically daily. 60 g 3    azelastine 0.1 % Nasal Solution INHALE 2 SPRAYS NASALLY 2 TIMES DAILY      pantoprazole 40 MG Oral Tab EC Take 1 tablet (40 mg total) by mouth 2 (two) times daily.      atorvastatin 80 MG Oral Tab Take 1 tablet (80 mg total) by mouth daily. 90 tablet 3    Multiple Vitamin (ONE-DAILY MULTI VITAMINS) Oral Tab Take 1 tablet by mouth daily.      Cholecalciferol (VITAMIN D) 50 MCG (2000 UT) Oral Cap Take by mouth.        Past Medical History:    Actinic keratosis    left wrist    Actinic keratosis    right forearm    Actinic keratosis    left neck    Actinic keratosis    left forearm    Anesthesia complication    HARD TO AWAKEN AFTER PROSTECTOMY    Arthritis    Atelectasis of both lungs    Basal cell carcinoma    left  upper arm    Basal cell carcinoma    right shoulder; left forearm    Basal cell carcinoma    right forehead    Breast cancer (HCC)    right breast     Breast cancer in male (HCC)    Right    Calculus of kidney    left lower lobe     COPD (chronic obstructive pulmonary disease) (HCC)    Esophageal reflux    High blood pressure    High cholesterol    Hyperlipidemia    Incontinence    bladder     Lymphedema of arm    right; wears sleeve; NO BP/IV to right arm     Malignant melanoma (HCC)    left calf    FRIEDMAN (nonalcoholic steatohepatitis)    Dr Louis ; from tamoxifen - stable    Osteoarthritis    Personal history of arthritis    Prostate cancer (HCC)    [facility]    Sleep apnea    CPAP    Squamous cell carcinoma    in-situ, mid upper forehead    Squamous cell carcinoma    in-situ, mid upper chest    Squamous cell carcinoma    in-situ, left upper arm; in-situ, left forearm    Squamous cell carcinoma    in-situ, right posterior neck; in-situ, right upper arm    Visual impairment    glasses      Past Surgical History:   Procedure Laterality Date    Colonoscopy N/A 12/05/2016    Procedure: COLONOSCOPY;  Surgeon: Cole Storey MD;  Location:  ENDOSCOPY    Colonoscopy  12/15/2018    Colonoscopy & polypectomy  12/05/2016    small polyp at prior polypectomy site     Foot/toes surgery proc unlisted  12/2012    Tarsil fusion, 8 screws and 21 plate    Hand/finger surgery unlisted      surgery of the thumb    Laparoscopy, surgical prostatectomy, retropubic radical, w/nerve sparing  01/2015    Leg/ankle surgery proc unlisted  1978 1989    first left then right    Mastectomy right  12/15/2015    SENTINEL AND AXILLARY LYMPH NODE REMOVAL, Do not use right arm for IV/BP    Other surgical history  01/2015    radical prostatectomy    Other surgical history Left 09/2022    melanoma removal calf    Other surgical history Left 10/2022    melanoma removal calf    Repair of biceps tendon at elbow  6/20012    Dr Reyes at [facility]     Repair rotator cuff,chronic      Shoulder surg proc unlisted Bilateral 2004/2012    right shoulder/left    Skin surgery Right 06/2022    shoulder skin cancer removal    Tonsillectomy        Family History   Problem Relation Age of Onset    Breast Cancer Sister 50        uterine ca after tamoxifen tx (dx 51)    Cancer Sister 51        uterine ca (due to tamoxifen)    Other (Benign breast biopsy) Sister 50    Gastro-Intestinal Disorder Father         Ulcers    Diabetes Father     Neurological Disorder Father         Parkinson's Disease    Other (Other) Father     Heart Disease Mother     Breast Cancer Mother 75        d.82    Other (Benign breast biopsy) Sister 45    Other (Benign breast biopsy) Sister     Breast Cancer Paternal Aunt         d.92    Cancer Maternal Uncle         brain ca; d.40's    Other (Gout) Brother     Cancer Self 59        prostate    Breast Cancer Self 60        invasive ductal ca      Social History     Socioeconomic History    Marital status:     Number of children: 3   Occupational History    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 [employer], [employer] Methyl ethyl ketone acetone exposure   Tobacco Use    Smoking status: Never    Smokeless tobacco: Never   Vaping Use    Vaping status: Never Used   Substance and Sexual Activity    Alcohol use: Yes     Comment: Social    Drug use: No   Other Topics Concern    Caffeine Concern Yes     Comment: coffee once a week 1 soda a day    Sleep Concern No    Exercise No    Seat Belt Yes           REVIEW OF SYSTEMS:   Today reviewed systens as documented below  GENERAL HEALTH: feels well otherwise  SKIN: Refer to exam below  RESPIRATORY: denies shortness of breath with exertion  CARDIOVASCULAR: denies chest pain on exertion  GI: denies abdominal pain and denies heartburn  NEURO: denies headaches    EXAM:   There were no vitals taken for this visit.  GENERAL: well developed, well nourished, in no apparent  distress  EXTREMITIES:   1. Integument: Skin is right foot was evaluated is warm and dry is a hyperkeratotic lesion directly underneath the fifth metatarsal head with a nucleated center.  It is painful to palpation is painful to walk on.   2. Vascular: Patient has palpable dorsalis pedis posterior tibial pulses on   3. Neurologic: Patient has intact pain sensation on the   4. Musculoskeletal: Has a cavus foot structure some atrophy of the fat pad.    ASSESSMENT AND PLAN:   Diagnoses and all orders for this visit:    Callus of foot    Metatarsalgia of right foot    Plantar fat pad atrophy of right foot        Plan: Trimmed down debrided the area gave him home care instructions we will try that first if not then we can maybe do a osteotomy to elevate the bone.  But for right now we will start out with routine care.  A sterile 15 blade was used for the trimming there was no hemorrhaging.  Padding was dispensed.    The patient indicates understanding of these issues and agrees to the plan.    Jeff Cardenas DPM

## 2024-12-20 ENCOUNTER — HOSPITAL ENCOUNTER (OUTPATIENT)
Dept: MAMMOGRAPHY | Facility: HOSPITAL | Age: 69
Discharge: HOME | End: 2024-12-20
Attending: SURGERY
Payer: COMMERCIAL

## 2024-12-20 DIAGNOSIS — N64.4 BREAST PAIN, LEFT: ICD-10-CM

## 2024-12-20 PROCEDURE — 77065 DX MAMMO INCL CAD UNI: CPT | Performed by: SURGERY

## 2024-12-20 PROCEDURE — 77061 BREAST TOMOSYNTHESIS UNI: CPT | Performed by: SURGERY

## 2024-12-21 DIAGNOSIS — M19.90 INFLAMMATORY ARTHRITIS: ICD-10-CM

## 2024-12-23 NOTE — TELEPHONE ENCOUNTER
Future Appointments   Date Time Provider Department Center   2/13/2025  9:30 AM Cesar Arnold MD G&B DERM [facility] [place]   3/12/2025 11:00 AM Ruba Richards DO EMGRHEUMHBSN EMG [place]   5/15/2025  7:45 AM Ramón Alex MD EMG 3 EMG [place]   5/28/2025  8:30 AM Jia Argueta PA-C EEMG Pulm EMG [place]   6/19/2025  9:00 AM Jeff Cardenas DPM [id number]   9/10/2025  3:45 PM [facility]   10/14/2025  8:45 AM Cande Zimmerman MD EMGSURGONC EMG Surg/Onc   11/25/2025  9:00 AM Raj Woodward MD EEMG Pulm EM[facility] [place]     Last office visit: 8/7/2024    Last fill: 11/1/2024 90 tab, 0 refills    Sending pt my chart message that there are fills at the pharmacy

## 2025-01-11 ENCOUNTER — HOSPITAL ENCOUNTER (OUTPATIENT)
Age: 70
Discharge: HOME | End: 2025-01-11
Attending: EMERGENCY MEDICINE
Payer: COMMERCIAL

## 2025-01-11 VITALS
OXYGEN SATURATION: 97 % | RESPIRATION RATE: 18 BRPM | WEIGHT: 204 LBS | BODY MASS INDEX: 29.2 KG/M2 | HEIGHT: 70 IN | DIASTOLIC BLOOD PRESSURE: 78 MMHG | SYSTOLIC BLOOD PRESSURE: 144 MMHG | TEMPERATURE: 97 F | HEART RATE: 72 BPM

## 2025-01-11 DIAGNOSIS — R30.0 DYSURIA: Primary | ICD-10-CM

## 2025-01-11 LAB
BILIRUB UR QL STRIP: NEGATIVE
CLARITY UR: CLEAR
COLOR UR: YELLOW
GLUCOSE UR STRIP-MCNC: NEGATIVE MG/DL
HGB UR QL STRIP: NEGATIVE
KETONES UR STRIP-MCNC: NEGATIVE MG/DL
LEUKOCYTE ESTERASE UR QL STRIP: NEGATIVE
NITRITE UR QL STRIP: NEGATIVE
PH UR STRIP: 5.5 [PH]
PROT UR STRIP-MCNC: NEGATIVE MG/DL
SP GR UR STRIP: 1.01
UROBILINOGEN UR STRIP-ACNC: <2 MG/DL

## 2025-01-11 PROCEDURE — 99214 OFFICE O/P EST MOD 30 MIN: CPT

## 2025-01-11 PROCEDURE — 81002 URINALYSIS NONAUTO W/O SCOPE: CPT

## 2025-01-11 PROCEDURE — 99212 OFFICE O/P EST SF 10 MIN: CPT

## 2025-01-11 PROCEDURE — 87086 URINE CULTURE/COLONY COUNT: CPT | Performed by: EMERGENCY MEDICINE

## 2025-01-11 PROCEDURE — 99213 OFFICE O/P EST LOW 20 MIN: CPT

## 2025-01-11 RX ORDER — SULFAMETHOXAZOLE AND TRIMETHOPRIM 800; 160 MG/1; MG/1
TABLET ORAL
COMMUNITY
Start: 2025-01-10

## 2025-01-11 NOTE — ED PROVIDER NOTES
Patient Seen in: [facility]      History   No chief complaint on file.    Stated Complaint: UTI symptoms    Subjective:   HPI    69-year-old male presents to the immediate care with complaints of urinary frequency urgency and dysuria.  He states he has been having the symptoms since December.  He came to the immediate care today because an outpatient order for a urinalysis but a urine culture had not been ordered.  He is here requesting a urine culture.  Patient is already prescribed an oral antibiotic with Bactrim.  He does have a follow-up appointment with his urologist.  He states that he is able to empty his bladder fully.  Denies any fevers or chills.  No there is no complaints of any nausea vomiting or flank pain.    Objective:     Past Medical History:    Actinic keratosis    left wrist    Actinic keratosis    right forearm    Actinic keratosis    left neck    Actinic keratosis    left forearm    Anesthesia complication    HARD TO AWAKEN AFTER PROSTECTOMY    Arthritis    Atelectasis of both lungs    Basal cell carcinoma    left upper arm    Basal cell carcinoma    right shoulder; left forearm    Basal cell carcinoma    right forehead    Breast cancer (HCC)    right breast     Breast cancer in male (HCC)    Right    Calculus of kidney    left lower lobe     COPD (chronic obstructive pulmonary disease) (HCC)    Esophageal reflux    High blood pressure    High cholesterol    Hyperlipidemia    Incontinence    bladder     Lymphedema of arm    right; wears sleeve; NO BP/IV to right arm     Malignant melanoma (HCC)    left calf    FRIEDMAN (nonalcoholic steatohepatitis)    Dr Louis ; from tamoxifen - stable    Osteoarthritis    Personal history of arthritis    Prostate cancer (HCC)    [facility]    Sleep apnea    CPAP    Squamous cell carcinoma    in-situ, mid upper forehead    Squamous cell carcinoma    in-situ, mid upper chest    Squamous cell carcinoma    in-situ, left upper arm; in-situ,  left forearm    Squamous cell carcinoma    in-situ, right posterior neck; in-situ, right upper arm    Visual impairment    glasses              Past Surgical History:   Procedure Laterality Date    Colonoscopy N/A 12/05/2016    Procedure: COLONOSCOPY;  Surgeon: Cole Storey MD;  Location:  ENDOSCOPY    Colonoscopy  12/15/2018    Colonoscopy & polypectomy  12/05/2016    small polyp at prior polypectomy site     Foot/toes surgery proc unlisted  12/2012    Tarsil fusion, 8 screws and 21 plate    Hand/finger surgery unlisted      surgery of the thumb    Laparoscopy, surgical prostatectomy, retropubic radical, w/nerve sparing  01/2015    Leg/ankle surgery proc unlisted  1978 1989    first left then right    Mastectomy right  12/15/2015    SENTINEL AND AXILLARY LYMPH NODE REMOVAL, Do not use right arm for IV/BP    Other surgical history  01/2015    radical prostatectomy    Other surgical history Left 09/2022    melanoma removal calf    Other surgical history Left 10/2022    melanoma removal calf    Repair of biceps tendon at elbow  6/20012    Dr Reyes at [facility]    Repair rotator cuff,chronic      Shoulder surg proc unlisted Bilateral 2004/2012    right shoulder/left    Skin surgery Right 06/2022    shoulder skin cancer removal    Tonsillectomy                  Social History     Socioeconomic History    Marital status:     Number of children: 3   Occupational History    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  Constitutional and vital signs reviewed.      All other systems reviewed and negative except as noted above.    Physical Exam     ED Triage Vitals [01/11/25 1035]   /78   Pulse 72   Resp 18   Temp 97.3 °F (36.3 °C)   Temp src Oral   SpO2 97 %   O2 Device None (Room air)       Current Vitals:   Vital Signs  BP: 144/78  Pulse: 72  Resp: 18  Temp: 97.3 °F (36.3 °C)  Temp src: Oral    Oxygen Therapy  SpO2: 97 %  O2 Device: None (Room air)        Physical Exam  General: Alert and oriented. No acute distress.  HEENT: Normocephalic. No evidence of trauma. Extraocular movements are intact.  Genitourinary: No evidence of any urethral discharge.  There is no external lesions to the penis.  He is circumcised.  Bilateral descended testes.  Extremities: No evidence of deformity. No clubbing or cyanosis.  Neuro: No focal deficit is noted.    ED Course     Labs Reviewed   [facility] POCT URINALYSIS DIPSTICK   URINE CULTURE, ROUTINE   Urine dip is unremarkable.  A urine culture will be sent.  Recommend the patient follow-up with urology.         MDM   Patient was screened and evaluated during this visit.   As a treating physician attending to the patient, I determined, within reasonable clinical confidence and prior to discharge, that an emergency medical condition was not or was no longer present.  There was no indication for further evaluation, treatment or admission on an emergency basis.  Comprehensive verbal and written discharge and follow-up instructions were provided to help prevent relapse or worsening.  Patient was instructed to follow-up with her primary care provider for further evaluation and treatment, but to return immediately to the ER for worsening, concerning, new, changing or persisting symptoms.  I discussed the case with the patient and they had no questions, complaints, or concerns.  Patient felt comfortable going home.    ^^Please note that this report has been  produced using speech recognition software and may contain errors related to that system including, but not limited to, errors in grammar, punctuation, and spelling, as well as words and phrases that possibly may have been recognized inappropriately.  If there are any questions or concerns, contact the dictating provider for clarification        Medical Decision Making      Disposition and Plan     Clinical Impression:  1. Dysuria         Disposition:  Discharge  1/11/2025 11:16 am    Follow-up:  No follow-up provider specified.        Medications Prescribed:  Current Discharge Medication List              Supplementary Documentation:

## 2025-01-29 ENCOUNTER — TELEPHONE (OUTPATIENT)
Facility: CLINIC | Age: 70
End: 2025-01-29

## 2025-01-29 NOTE — TELEPHONE ENCOUNTER
Usage 12/30/2024 - 01/28/2025  Usage days 29/30 days (97%)  >= 4 hours 28 days (93%)  < 4 hours 1 days (3%)  Usage hours 208 hours 30 minutes  Average usage (total days) 6 hours 57 minutes  Average usage (days used) 7 hours 11 minutes  Median usage (days used) 7 hours 21 minutes  Total used hours (value since last reset - 01/28/2025) 424 hours  AirSense 11 AutoSet  Serial number [id number]  Mode CPAP  Set pressure 10 cmH2O  EPR Fulltime  EPR level 1  Therapy  Leaks - L/min Median: 2.5 95th percentile: 23.4 Maximum: 34.9  Events per hour AI: 1.0 HI: 0.2 AHI: 1.2  Apnea Index Central: 0.1 Obstructive: 0.9 Unknown: 0.0  RERA Index 0.1  Cheyne-Baumann respiration (average duration per night) 0 minutes (0%)

## 2025-02-06 ENCOUNTER — HOSPITAL ENCOUNTER (OUTPATIENT)
Age: 70
Discharge: HOME | End: 2025-02-06
Attending: EMERGENCY MEDICINE
Payer: COMMERCIAL

## 2025-02-06 ENCOUNTER — APPOINTMENT (OUTPATIENT)
Dept: CT IMAGING | Age: 70
End: 2025-02-06
Attending: EMERGENCY MEDICINE
Payer: COMMERCIAL

## 2025-02-06 VITALS
DIASTOLIC BLOOD PRESSURE: 91 MMHG | HEART RATE: 82 BPM | BODY MASS INDEX: 29.53 KG/M2 | TEMPERATURE: 99 F | SYSTOLIC BLOOD PRESSURE: 165 MMHG | HEIGHT: 69.5 IN | WEIGHT: 204 LBS | RESPIRATION RATE: 18 BRPM | OXYGEN SATURATION: 97 %

## 2025-02-06 DIAGNOSIS — S00.83XA CONTUSION OF FACE, INITIAL ENCOUNTER: Primary | ICD-10-CM

## 2025-02-06 PROCEDURE — 99213 OFFICE O/P EST LOW 20 MIN: CPT

## 2025-02-06 PROCEDURE — 99214 OFFICE O/P EST MOD 30 MIN: CPT

## 2025-02-06 PROCEDURE — 70450 CT HEAD/BRAIN W/O DYE: CPT | Performed by: EMERGENCY MEDICINE

## 2025-02-06 NOTE — DISCHARGE INSTRUCTIONS
Light diet  Push fluids  Tylenol and Motrin for pain  Reviewed the head injury instructions  Good wound care    Follow-up with your primary care doctor.  Call today for an appointment for reexamination culture check next week

## 2025-02-06 NOTE — ED PROVIDER NOTES
Patient Seen in: [facility]      History     Chief Complaint   Patient presents with    Fall     Stated Complaint: Headache - Took a fall    Subjective:   HPI      Patient had a fall last night.  He fell forward and struck his head.  He never lost consciousness.  Patient had a laceration to his forehead and has some bruising around the left eye.  No visual change such as double or blurry vision.  No neck pain.  No numbness or weakness of his body, incoordination or clumsiness.  Patient took some ibuprofen this morning.  He cannot recall his last tetanus being less than 5 years    Objective:     Past Medical History:    Actinic keratosis    left wrist    Actinic keratosis    right forearm    Actinic keratosis    left neck    Actinic keratosis    left forearm    Anesthesia complication    HARD TO AWAKEN AFTER PROSTECTOMY    Arthritis    Atelectasis of both lungs    Basal cell carcinoma    left upper arm    Basal cell carcinoma    right shoulder; left forearm    Basal cell carcinoma    right forehead    Breast cancer (HCC)    right breast     Breast cancer in male (HCC)    Right    Calculus of kidney    left lower lobe     COPD (chronic obstructive pulmonary disease) (HCC)    Esophageal reflux    High blood pressure    High cholesterol    Hyperlipidemia    Incontinence    bladder     Lymphedema of arm    right; wears sleeve; NO BP/IV to right arm     Malignant melanoma (HCC)    left calf    FRIEDMAN (nonalcoholic steatohepatitis)    Dr Louis ; from tamoxifen - stable    Osteoarthritis    Personal history of arthritis    Prostate cancer (HCC)    [facility]    Sleep apnea    CPAP    Squamous cell carcinoma    in-situ, mid upper forehead    Squamous cell carcinoma    in-situ, mid upper chest    Squamous cell carcinoma    in-situ, left upper arm; in-situ, left forearm    Squamous cell carcinoma    in-situ, right posterior neck; in-situ, right upper arm    Visual impairment    glasses               Past Surgical History:   Procedure Laterality Date    Colonoscopy N/A 12/05/2016    Procedure: COLONOSCOPY;  Surgeon: Cole Storey MD;  Location:  ENDOSCOPY    Colonoscopy  12/15/2018    Colonoscopy & polypectomy  12/05/2016    small polyp at prior polypectomy site     Foot/toes surgery proc unlisted  12/2012    Tarsil fusion, 8 screws and 21 plate    Hand/finger surgery unlisted      surgery of the thumb    Laparoscopy, surgical prostatectomy, retropubic radical, w/nerve sparing  01/2015    Leg/ankle surgery proc unlisted  1978 1989    first left then right    Mastectomy right  12/15/2015    SENTINEL AND AXILLARY LYMPH NODE REMOVAL, Do not use right arm for IV/BP    Other surgical history  01/2015    radical prostatectomy    Other surgical history Left 09/2022    melanoma removal calf    Other surgical history Left 10/2022    melanoma removal calf    Repair of biceps tendon at elbow  6/20012    Dr Reyes at [facility]    Repair rotator cuff,chronic      Shoulder surg proc unlisted Bilateral 2004/2012    right shoulder/left    Skin surgery Right 06/2022    shoulder skin cancer removal    Tonsillectomy                  Social History     Socioeconomic History    Marital status:     Number of children: 3   Occupational History    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, [employer] Methyl ethyl ketone acetone exposure   Tobacco Use    Smoking status: Never    Smokeless tobacco: Never   Vaping Use    Vaping status: Never Used   Substance and Sexual Activity    Alcohol use: Yes     Comment: Social    Drug use: No   Other Topics Concern    Caffeine Concern Yes     Comment: coffee once a week 1 soda a day    Sleep Concern No    Exercise No    Seat Belt Yes              Review of Systems    Positive for stated complaint: Headache - Took a fall  Other systems are as noted in HPI.  Constitutional and vital signs reviewed.      All other systems reviewed and  negative except as noted above.    Physical Exam     ED Triage Vitals [02/06/25 1322]   BP (!) 165/91   Pulse 82   Resp 18   Temp 99.2 °F (37.3 °C)   Temp src Oral   SpO2 97 %   O2 Device None (Room air)       Current Vitals:   Vital Signs  BP: (!) 165/91  Pulse: 82  Resp: 18  Temp: 99.2 °F (37.3 °C)  Temp src: Oral    Oxygen Therapy  SpO2: 97 %  O2 Device: None (Room air)        Physical Exam  General: The patient is awake, alert, conversant.  There is a scabbed over 1 cm laceration over the left lower forehead about 1 fingerbreadth superior to the lateral aspect of the eyebrow.  A smaller more superficial appearing abrasion noted at the inferior lateral border of the orbit.  Mild soft tissue swelling surrounding.  There is some ecchymosis noted in the infraorbital and lateral aspect about the orbit.  Eyes: sclera white, conjunctiva pink and moist.  Lids and lashes are normal.  Pupils are equal, round, and reactive.  Extract movements intact without diplopia  Nose: Atraumatic  Throat: Posterior pharynx is normal.  Jaw has good nontender active range of motion   Neck: nontender midline and paraspinal musculature with good active nontender range of motion  Neurologic:  Mental status as above.  Patient moves all extremities with good strength and coordination.        ED Course   Labs Reviewed - No data to display                MDM     Patient with contusion to the head.  Lacerations are well-approximated and healing by secondary intention would be the safest course of action seeing the injury occurred last night.  Patient has had a significant contusion to the head and has some concussive symptoms with headache  Intracranial injury such as subdural hematoma should be excluded.    Tetanus was updated    CT brain    CONCLUSION:  No acute disease        I reviewed the results of the workup.  I recommend supportive care.    Medical Decision Making      Disposition and Plan     Clinical Impression:  1. Contusion of face,  initial encounter         Disposition:  Discharge  2/6/2025  2:38 pm    Follow-up:  Ramón Alex MD  [address] [place] [id number] [phone]    Call today  For wound re-check          Medications Prescribed:  Current Discharge Medication List              Supplementary Documentation:

## 2025-02-11 ENCOUNTER — OFFICE VISIT (OUTPATIENT)
Dept: FAMILY MEDICINE CLINIC | Facility: CLINIC | Age: 70
End: 2025-02-11
Payer: COMMERCIAL

## 2025-02-11 VITALS
SYSTOLIC BLOOD PRESSURE: 140 MMHG | OXYGEN SATURATION: 98 % | HEART RATE: 70 BPM | RESPIRATION RATE: 16 BRPM | BODY MASS INDEX: 32 KG/M2 | WEIGHT: 217 LBS | DIASTOLIC BLOOD PRESSURE: 70 MMHG

## 2025-02-11 DIAGNOSIS — S00.83XD TRAUMATIC ECCHYMOSIS OF FACE, SUBSEQUENT ENCOUNTER: ICD-10-CM

## 2025-02-11 DIAGNOSIS — S01.81XD FACIAL LACERATION, SUBSEQUENT ENCOUNTER: ICD-10-CM
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
## 2025-02-11 PROCEDURE — 3077F SYST BP >= 140 MM HG: CPT | Performed by: PHYSICIAN ASSISTANT

## 2025-02-11 PROCEDURE — 99214 OFFICE O/P EST MOD 30 MIN: CPT | Performed by: PHYSICIAN ASSISTANT

## 2025-02-11 PROCEDURE — 3078F DIAST BP <80 MM HG: CPT | Performed by: PHYSICIAN ASSISTANT

## 2025-02-17 DIAGNOSIS — M19.90 INFLAMMATORY ARTHRITIS: ICD-10-CM

## 2025-02-17 RX ORDER — COLCHICINE 0.6 MG/1
0.6 TABLET ORAL DAILY
Qty: 90 TABLET | Refills: 0 | OUTPATIENT
Start: 2025-02-17

## 2025-02-17 RX ORDER — COLCHICINE 0.6 MG/1
0.6 TABLET ORAL DAILY
Qty: 90 TABLET | Refills: 0 | Status: SHIPPED | OUTPATIENT
Start: 2025-02-17

## 2025-02-17 NOTE — PROGRESS NOTES
Chief Complaint   Patient presents with    ER F/U     02/06/25, Fall         HISTORY OF PRESENT ILLNESS  Cesar Wray is a 69 year old male who presents for follow up after fall. Patient was leaving restaurant and slipped on ice. He feel forward onto his left side of the face/ eye. No LOC. He was able to get up and start walking right away. He went to  the next day- unremarkable CT brain. He did have 2 small scabbed lacerations (one near eyebrow, one inferior to eye). Now with bruising below the eye. No vision changes noted. He is otherwise feeling well. No numbness, tingling, weakness, gait disturbance.       Current Outpatient Medications:     sulfamethoxazole-trimethoprim -160 MG Oral Tab per tablet, , Disp: , Rfl:     Zolpidem Tartrate ER 12.5 MG Oral Tab CR, TAKE ONE TABLET BY MOUTH NIGHTLY AS NEEDED FOR SLEEP, Disp: 90 tablet, Rfl: 1    COLCHICINE 0.6 MG Oral Tab, Take 1 tablet by mouth daily., Disp: 90 tablet, Rfl: 0    ascorbic acid 100 MG Oral Tab, Vitamin C 100 mg tablet, [RxNorm: 175066], Disp: , Rfl:     cyanocobalamin 1000 MCG Oral Tab, cyanocobalamin (vit B-12) 1,000 mcg tablet, [RxNorm: 325231], Disp: , Rfl:     SILDENAFIL CITRATE 100 MG Oral Tab, TAKE 1 TABLET BY MOUTH EVERY DAY AS NEEDED, Disp: 30 tablet, Rfl: 1    azelastine 0.1 % Nasal Solution, INHALE 2 SPRAYS NASALLY 2 TIMES DAILY, Disp: , Rfl:     pantoprazole 40 MG Oral Tab EC, Take 1 tablet (40 mg total) by mouth 2 (two) times daily., Disp: , Rfl:     atorvastatin 80 MG Oral Tab, Take 1 tablet (80 mg total) by mouth daily., Disp: 90 tablet, Rfl: 3    Multiple Vitamin (ONE-DAILY MULTI VITAMINS) Oral Tab, Take 1 tablet by mouth daily., Disp: , Rfl:     Cholecalciferol (VITAMIN D) 50 MCG (2000 UT) Oral Cap, Take by mouth., Disp: , Rfl:     Calcipotriene (CALCITRENE) 0.005 % External Ointment, Apply to arms and legs each evening., Disp: 60 g, Rfl: 2    clobetasol 0.05 % External Ointment, Apply to arms and legs each morning, Disp:  30 g, Rfl: 2    Allergies: Levaquin [levofloxacin] and Hibiclens    Patient Active Problem List   Diagnosis    History of prostate cancer    Pain in joint, ankle and foot    Mixed hyperlipidemia    Vitamin D deficiency    Insomnia    Hemangioma    Nephrolithiasis    History of breast cancer in male    Family history of malignant neoplasm of breast    PRICILA (obstructive sleep apnea)    Thrombocytopenia    ED (erectile dysfunction) of organic origin    PEDRO (stress urinary incontinence), male    Psoriasis    NAFLD (nonalcoholic fatty liver disease)    History of malignant melanoma of skin    Chronic obstructive pulmonary disease, unspecified (HCC)    SVT (supraventricular tachycardia) (HCC)    Lymphedema    Primary hypertension    Inflammatory polyarthritis (HCC)    Selective IgM deficiency (HCC)    Breast pain, left    History of right breast cancer       Past Surgical History:   Procedure Laterality Date    Colonoscopy N/A 12/05/2016    Procedure: COLONOSCOPY;  Surgeon: Cole Storey MD;  Location:  ENDOSCOPY    Colonoscopy  12/15/2018    Colonoscopy & polypectomy  12/05/2016    small polyp at prior polypectomy site     Foot/toes surgery proc unlisted  12/2012    Tarsil fusion, 8 screws and 21 plate    Hand/finger surgery unlisted      surgery of the thumb    Laparoscopy, surgical prostatectomy, retropubic radical, w/nerve sparing  01/2015    Leg/ankle surgery proc unlisted  1978 1989    first left then right    Mastectomy right  12/15/2015    SENTINEL AND AXILLARY LYMPH NODE REMOVAL, Do not use right arm for IV/BP    Other surgical history  01/2015    radical prostatectomy    Other surgical history Left 09/2022    melanoma removal calf    Other surgical history Left 10/2022    melanoma removal calf    Repair of biceps tendon at elbow  6/20012    Dr Reyes at [facility]    Repair rotator cuff,chronic      Shoulder surg proc unlisted Bilateral 2004/2012    right shoulder/left    Skin surgery Right 06/2022    shoulder skin  cancer removal    Tonsillectomy         Social History     Socioeconomic History    Marital status:     Number of children: 3   Occupational History    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  HEAD: Normocephalic, atraumatic. 2 scabbed lacerations (near eyebrow, inferior lateral border of orbit). Mild swelling and bruising infraorbital.   EYES: White conjunctiva, clear sclera. PERRL. EOMI.  EARS: External auditory canals clear bilaterally. No pain with manipulation of tragus or auricle. No mastoid tenderness or erythema. Tympanic membranes clear bilaterally.  NOSE clear  MOUTH/ THROAT: Moist mucous membranes. Posterior oropharynx clear without exudate or erythema.  NECK: Supple without lymphadenopathy.  CARDIOVASCULAR: Regular rate and rhythm, no murmurs/ rubs/ gallops.  PULMONARY: Normal effort on room air. Clear to auscultation bilaterally. No adventitious breath sounds appreciated.  NEURO Normal gait. Normal finger to nose. CN 2-12 grossly intact.       ASSESSMENT/ PLAN  1. Fall, subsequent encounter  2. Facial laceration, subsequent encounter  3. Traumatic ecchymosis of face, subsequent encounter  Doing well. Reviewed CT imaging report. Lacerations healing well. Swelling/ bruising also appears to be healing. Recommend that he try warm compresses on this if interested. Can also  use vaseline on the lacerations to help with scarring. Slipped on ice- no concerns for underlying cause of gait instability/ fall risk. Follow up PRN.      Patient expresses understanding and agreement with above plan.  Steven Pena PA-C

## 2025-02-17 NOTE — TELEPHONE ENCOUNTER
Colchicine 0.6 mg    Future Appointments   Date Time Provider Department Center   2/20/2025  7:45 AM SONAL NURSE [facility]   3/12/2025 11:00 AM Ruba Richards DO [facility] [place]   3/12/2025 11:30 AM Cesar Arnold MD [facility]   5/15/2025  7:45 AM Ramón Alex MD EM[facility] 3 [facility]   5/28/2025  8:30 AM Jia Argueta PA-C [facility] Pulm [facility] [place]   6/19/2025  9:00 AM Jeff Cardenas DPM [id number]   8/15/2025  8:30 AM Cesar Arnold MD [facility]   9/10/2025  3:45 PM [facility]   10/14/2025  8:45 AM Cande Zimmerman MD [facility]GON[facility] Surg/Onc   11/25/2025  9:00 AM Raj Woodward MD [facility] Pulm [facility] [place]     Last office visit: 8/7/2024    Last fill: 90 tab, 0 refills

## 2025-03-12 ENCOUNTER — OFFICE VISIT (OUTPATIENT)
Dept: RHEUMATOLOGY | Facility: CLINIC | Age: 70
End: 2025-03-12
Payer: COMMERCIAL

## 2025-03-12 VITALS
BODY MASS INDEX: 31.13 KG/M2 | WEIGHT: 215 LBS | HEART RATE: 72 BPM | OXYGEN SATURATION: 96 % | DIASTOLIC BLOOD PRESSURE: 70 MMHG | HEIGHT: 69.5 IN | TEMPERATURE: 99 F | SYSTOLIC BLOOD PRESSURE: 142 MMHG | RESPIRATION RATE: 16 BRPM

## 2025-03-12 DIAGNOSIS — I89.0 LYMPHEDEMA: ICD-10-CM

## 2025-03-12 DIAGNOSIS — M19.90 INFLAMMATORY ARTHRITIS: Primary | ICD-10-CM

## 2025-03-12 DIAGNOSIS — L40.9 PSORIASIS: ICD-10-CM

## 2025-03-12 DIAGNOSIS — Z79.899 ON COLCHICINE THERAPY: ICD-10-CM

## 2025-03-12 DIAGNOSIS — M25.50 POLYARTHRALGIA: ICD-10-CM

## 2025-03-12 PROCEDURE — 3008F BODY MASS INDEX DOCD: CPT | Performed by: INTERNAL MEDICINE

## 2025-03-12 PROCEDURE — 3077F SYST BP >= 140 MM HG: CPT | Performed by: INTERNAL MEDICINE

## 2025-03-12 PROCEDURE — 3078F DIAST BP <80 MM HG: CPT | Performed by: INTERNAL MEDICINE

## 2025-03-12 PROCEDURE — 99214 OFFICE O/P EST MOD 30 MIN: CPT | Performed by: INTERNAL MEDICINE

## 2025-03-12 NOTE — PROGRESS NOTES
RHEUMATOLOGY FOLLOW UP   Date of visit: 03/12/2025  ?  Chief Complaint   Patient presents with    Follow - Up     7 month f/u. Feeling pretty good. Joint pain in knees, ankles, and left wrist. Swelling in left wrist. No new symptoms. Having KAYLEN procedure in April. Converted rapid score of 1.3       ASSESSMENT, DISCUSSION & PLAN   Assessment:  1. Inflammatory arthritis    2. Polyarthralgia    3. Lymphedema    4. On colchicine therapy    5. Psoriasis          Discussion:  Mr. Cesar Wray is a 68 yo man with a complicated history of prostate cancer, breast cancer as well as multiple skin cancers who presented for evaluation due to polyarticular joint pain and swelling. States symptoms started in July/August 2020. He had a complicated course of knee aspiration showing staph aureus, however cell counts not consistent with septic arthritis. Was placed on antibiotics. Since that time, he has had worsened joint pain in his wrists and elbows with pain/swelling there. He has chronic pain in his feet/ankles from previous surgeries. He did follow up with ID and told joint pain unlikely infectious in nature.   At his initial visit, I worried about a non-infectious inflammatory arthropathy. He may have a reactive arthritis linked to his frequent UTIs vs a seronegative spondyloarthropathy vs RA like disease. Another consideration is possible underlying malignancy causing an inflammatory arthropathy.   His autoimmune workup was negative including inflammatory markers.  Since his initial visit, he underwent right rotator cuff surgery. Then, he had worsened bilateral wrist and hand pain. He reports swelling of the left wrist, underwent MRI imaging which did not show any signs of inflammation but more degenerative changes. He is following with ortho for that and knees.     His course of action is very complicated. He already has borderline renal function as well as hx of fatty liver for which he is following with   Missy. Had previously recommended if DMARD therapy initiated, to try sulfasalazine instead of methotrexate. He took 6 tabs once weekly without significant relief. He stopped methotrexate and nabumetone and noted worsened symptoms overall. Labs still show the leukopenia and thrombocytopenia unchanged compared to pre-methotrexate and while on methotrexate. 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   He tried sulfasalazine without improvement of symptoms. Even tried increasing to 1500mg BID with dizziness and other side effects. Despite the increase dosing for a month, he didn't have any improvement of symptoms.  He was dx with melanoma and required radical dissection. After this, he had had worsened lymphedema of his left leg and abdomen. Seems to have triggered a reactive arthritis flare again  He was given course of prednisone without significant improvement of symptoms.  At his last visit, recommended trial of colchicine which he has been taking regularly. Seems like his flares are less often and possibly less severe. No significant increased infections incidence.   Discussed given hx of psoriasis, there may be a component of psoriatic arthritis. May benefit from considering otezla which he will discuss further with dermatology. However concerned for potential cancer risk so pt will need to decide if risk worth the benefit  In the meantime, recommended updated labs to monitor for toxicities from chronic colchicine use.     Follow up in 6-9 months or sooner as needed.     Patient verbalized understanding of above instructions. No further questions at this time.    Code selection for this visit was based on time spent (30min) on date of service in preparing to see the patient, obtaining and/or reviewing separately obtained history, performing a medically appropriate examination, counseling and educating the patient/family/caregiver, ordering medications or  testing, referring and communicating with other healthcare providers, documenting clinical information in the E HR, independently interpreting results and communicating results to the patient/family/caregiver and care coordination with the patient's other providers.      ?  Plan:  Diagnoses and all orders for this visit:    Inflammatory arthritis  -     CBC With Differential With Platelet; Future  -     Comp Metabolic Panel (14); Future  -     C-Reactive Protein; Future  -     Sed Rate, Westergren (Automated); Future    Polyarthralgia  -     CBC With Differential With Platelet; Future  -     Comp Metabolic Panel (14); Future  -     C-Reactive Protein; Future  -     Sed Rate, Westergren (Automated); Future    Lymphedema    On colchicine therapy  -     CBC With Differential With Platelet; Future  -     Comp Metabolic Panel (14); Future  -     C-Reactive Protein; Future  -     Sed Rate, Westergren (Automated); Future    Psoriasis            Return in about 6 months (around 9/12/2025).  ?  HPI   Cesar Wray is a 69 year old male with the following active problems who was seen as a new patient a few months ago for polyarthralgia. He had multiple areas of joint pain. He was also admitted to the hospital previously for septic arthritis. He presents for follow up today.      Since his last visit, he feels about the same.   Has to have MOHS surgery for squamous cell over the scalp, planned in April.    States about a 1-1.5 months ago, had a head cold and UTI, and had knees, ankles and left hand. Did have some swelling. Was taking motrin, took about a month to resolve.   Then last month, suffered a concussion on 2/5, slipped on ice. Did have some cuts on face. Still has some symptoms-headaches on/off.     Following with ortho for the microfracture in the left knee- did crutches for 16w. Told when he feels ready to follow up with ortho for replacement but pt holding off for now.  Having morning stiffness as well as  when getting up from seated position after resting for some time- particularly knees and ankles   Left wrist pain stable. Right fine   Not much left elbow pain but can get swelling occasionally   Some fluid retention in the legs. - not wearing left compression sleeve now. More intermittent.   Denies new nodule or cyst formation.     + rash behind right knee and right upper arm and left ankle. Stopped Zorvye and now on two different topicals- now thinks psoriasis.     Had EGD/cscope done previous- a few polyps and small hiatal hernia. Put on pantoprazole BID. Prior chest discomfort has gotten better. Also stopped with mucous production. Also added nasal spray.  Underwent sinus surgery in Nov (fixed deviated septum). No longer have the sinus pain. No recent abx- no blood or dark colored mucous  Cough less overall   + one UTI in early Nov     Reminds me he did not go through any chemotherapy for either prostate or breast cancer. He was on tamoxifen until earlier this year (s/p 5 years of therapy). He did have knee septic arthritis previously which was the start of his problems.   Some basal cell carcinoma.     Previously tried (none provided sustained relief)   Sulfasalazine 1500mg BID  Methotrexate  Colchicine  Prednisone   Amitriptyline- didn't like how it made him feel- loopy     HPI from initial consultation  referred for rheumatologic evaluation due to joint pain.     States he developed bilateral foot pain in July/August. Did have pain without swelling. Does have hx of reconstructive surgery in both ankles and both feet. States the morning he went to see his PCP, he had left knee pain and swelling and felt warmer (checked temperature). Followed with one orthopedic surgeon. Had fluid aspirated and told to check in to ER. Was in hospital at the end of August, given IV antibiotics and discharged on oral antibiotics for 21 days. Then also on Bactrim for sometime. States aspiration was negative for crystals.   Then  developed bilateral wrist pain and swelling about one month ago. Also has bilateral elbow swelling.   Followed with Dr. Rutherford who did not think related to septic.   Currently knees are uncomfortable.  Also is currently treated with antibiotics currently for a UTI or kidney infection.   Was given prednisone and colchicine prior to hospitalization and was on for about 1 week without improvement of symptoms.     Hx of prostate cancer s/p prostatectomy (no radiation or chemo) - Jan 2015  Hx of breast cancer s/p mastectomy; on tamoxifen now  (no radiation or chemo) - Dec 2015  Hx of multiple skin cancers (both squamous and basal)  Hx of PRICILA on CPAP    + morning stiffness lasts throughout the day, but worst for 5 minutes, improved minimally with activity   + right supraspinatus and infraspinatus repair   + left big toe pain chronic since fusion years ago   + hair loss/thinning thought normal age related   + hx of rashes but stopped after prostate surgery, no recurrence since that time.   + hx of borderline thrombocytopenia   + hx of fatty liver disease  + hx of renal stones   + tightness in the calves  + intermittent lymphadenopathy around the angle of the jaw and left armpit; regardless of feeling sick.   + night sweats thought related to tamoxifen   + admits to slow healing of minor wounds   + gets blood in urine typically when has UTI    The patient denies oral or nasal ulcers, photosensitive rash, Raynaud's phenomenon, or history of seizures. Denies hx of pericarditis or pleuritis.   No history of prior blood clot in the legs or lungs, strokes or ischemic phenomenon.  Denies nonhealing ulcers on the fingertips, trouble swallowing, or severe acid reflux.  The patient denies any history of uveitis, crampy abdominal pain,  constipation, diarrhea, bloody stools, nodular painful shin bruises, Achilles heel pain, spooning or pitting of the nails, or history of dactylitis..  There are no symptoms of severe dry eyes, dry  mouth, or swelling of the cheeks or under the jawbone.   No fevers, chills, unexpected weight loss, easy bruising or bleeding, or unexplained weakness.  Denies epistaxis.  Denies chronic cough or hemoptysis.     Family hx:  Multiple family members with gout- father, older brother         Past Medical History:  Past Medical History:    Actinic keratosis    left wrist    Actinic keratosis    right forearm    Actinic keratosis    left neck    Actinic keratosis    left forearm    Anesthesia complication    HARD TO AWAKEN AFTER PROSTECTOMY    Arthritis    Atelectasis of both lungs    Basal cell carcinoma    left upper arm    Basal cell carcinoma    right shoulder; left forearm    Basal cell carcinoma    right forehead    Breast cancer (HCC)    right breast     Breast cancer in male (HCC)    Right    Calculus of kidney    left lower lobe     COPD (chronic obstructive pulmonary disease) (HCC)    Esophageal reflux    High blood pressure    High cholesterol    Hyperlipidemia    Incontinence    bladder     Lymphedema of arm    right; wears sleeve; NO BP/IV to right arm     Malignant melanoma (HCC)    left calf    FRIEDMAN (nonalcoholic steatohepatitis)    Dr Louis ; from tamoxifen - stable    Osteoarthritis    Personal history of arthritis    Prostate cancer (HCC)    [facility]    Sleep apnea    CPAP    Squamous cell carcinoma    in-situ, mid upper forehead    Squamous cell carcinoma    in-situ, mid upper chest    Squamous cell carcinoma    in-situ, left upper arm; in-situ, left forearm    Squamous cell carcinoma    in-situ, right posterior neck; in-situ, right upper arm    Visual impairment    glasses     Past Surgical History:  Past Surgical History:   Procedure Laterality Date    Colonoscopy N/A 12/05/2016    Procedure: COLONOSCOPY;  Surgeon: Cole Storey MD;  Location:  ENDOSCOPY    Colonoscopy  12/15/2018    Colonoscopy & polypectomy  12/05/2016    small polyp at prior polypectomy site     Foot/toes surgery  proc unlisted  12/2012    Tarsil fusion, 8 screws and 21 plate    Hand/finger surgery unlisted      surgery of the thumb    Laparoscopy, surgical prostatectomy, retropubic radical, w/nerve sparing  01/2015    Leg/ankle surgery proc unlisted  1978 1989    first left then right    Mastectomy right  12/15/2015    SENTINEL AND AXILLARY LYMPH NODE REMOVAL, Do not use right arm for IV/BP    Other surgical history  01/2015    radical prostatectomy    Other surgical history Left 09/2022    melanoma removal calf    Other surgical history Left 10/2022    melanoma removal calf    Repair of biceps tendon at elbow  6/20012    Dr Reyes at [facility]    Repair rotator cuff,chronic      Shoulder surg proc unlisted Bilateral 2004/2012    right shoulder/left    Skin surgery Right 06/2022    shoulder skin cancer removal    Tonsillectomy       Family History:  Family History   Problem Relation Age of Onset    Breast Cancer Sister 50        uterine ca after tamoxifen tx (dx 51)    Cancer Sister 51        uterine ca (due to tamoxifen)    Other (Benign breast biopsy) Sister 50    Gastro-Intestinal Disorder Father         Ulcers    Diabetes Father     Neurological Disorder Father         Parkinson's Disease    Other (Other) Father     Heart Disease Mother     Breast Cancer Mother 75        d.82    Other (Benign breast biopsy) Sister 45    Other (Benign breast biopsy) Sister     Breast Cancer Paternal Aunt         d.92    Cancer Maternal Uncle         brain ca; d.40's    Other (Gout) Brother     Cancer Self 59        prostate    Breast Cancer Self 60        invasive ductal ca     Social History:  Social History     Socioeconomic History    Marital status:     Number of children: 3   Occupational History    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, [employer] ethyl ketone acetone exposure   Tobacco Use    Smoking status: Never    Smokeless tobacco: Never   Vaping Use    Vaping  status: Never Used   Substance and Sexual Activity    Alcohol use: Yes     Comment: Social    Drug use: No   Other Topics Concern    Caffeine Concern Yes     Comment: coffee once a week 1 soda a day    Sleep Concern No    Exercise No    Seat Belt Yes     Medications:  Outpatient Medications Marked as Taking for the 3/12/25 encounter (Office Visit) with Ruba Richards DO   Medication Sig Dispense Refill    colchicine 0.6 MG Oral Tab Take 1 tablet (0.6 mg total) by mouth daily. 90 tablet 0    Calcipotriene (CALCITRENE) 0.005 % External Ointment Apply to arms and legs each evening. 60 g 2    clobetasol 0.05 % External Ointment Apply to arms and legs each morning 30 g 2    Zolpidem Tartrate ER 12.5 MG Oral Tab CR TAKE ONE TABLET BY MOUTH NIGHTLY AS NEEDED FOR SLEEP 90 tablet 1    ascorbic acid 100 MG Oral Tab Vitamin C 100 mg tablet, [RxNorm: 326100]      cyanocobalamin 1000 MCG Oral Tab cyanocobalamin (vit B-12) 1,000 mcg tablet, [RxNorm: 314346]      SILDENAFIL CITRATE 100 MG Oral Tab TAKE 1 TABLET BY MOUTH EVERY DAY AS NEEDED 30 tablet 1    azelastine 0.1 % Nasal Solution INHALE 2 SPRAYS NASALLY 2 TIMES DAILY      pantoprazole 40 MG Oral Tab EC Take 1 tablet (40 mg total) by mouth 2 (two) times daily.      atorvastatin 80 MG Oral Tab Take 1 tablet (80 mg total) by mouth daily. 90 tablet 3    Multiple Vitamin (ONE-DAILY MULTI VITAMINS) Oral Tab Take 1 tablet by mouth daily.      Cholecalciferol (VITAMIN D) 50 MCG (2000 UT) Oral Cap Take by mouth.       Modified Medications    No medications on file     Medications Discontinued During This Encounter   Medication Reason    sulfamethoxazole-trimethoprim -160 MG Oral Tab per tablet      ?  ?  Allergies:  Allergies   Allergen Reactions    Levaquin [Levofloxacin] SWELLING     Other reaction(s): Other  Insomnia; swelling      Hibiclens RASH     ?  REVIEW OF SYSTEMS   ?  Review of Systems   Constitutional:  Positive for malaise/fatigue. Negative for chills, fever and  weight loss.   HENT:  Positive for tinnitus. Negative for congestion and hearing loss.         + bleeding in mouth   Eyes:  Negative for pain and redness.   Respiratory:  Positive for cough. Negative for hemoptysis and shortness of breath.    Cardiovascular:  Positive for leg swelling. Negative for chest pain.   Gastrointestinal:  Negative for abdominal pain, blood in stool, constipation, diarrhea, heartburn and nausea.   Genitourinary:  Positive for frequency. Negative for dysuria, hematuria and urgency.   Musculoskeletal:  Positive for joint pain and neck pain. Negative for back pain and myalgias.        Ankles, knees, wrists   Skin:  Positive for rash. Negative for itching.   Neurological:  Negative for dizziness, tingling, seizures, weakness and headaches.        + muscle twitching   Endo/Heme/Allergies:  Bruises/bleeds easily.   Psychiatric/Behavioral:  Negative for depression. The patient has insomnia. The patient is not nervous/anxious.         micah Kinney     PHYSICAL EXAM   Today's Vitals:  Temperature Blood Pressure Heart Rate Resp Rate SpO2   Temp: 98.7 °F (37.1 °C) BP: 142/70 Pulse: 72 Resp: 16 SpO2: 96 %   ?  Current Weight Height BMI BSA Pain   Wt Readings from Last 1 Encounters:   03/12/25 215 lb (97.5 kg)    Height: 5' 9.5\" (176.5 cm) Body mass index is 31.29 kg/m². Body surface area is 2.14 meters squared.         Physical Exam  Vitals and nursing note reviewed.   Constitutional:       General: He is not in acute distress.     Appearance: Normal appearance. He is well-developed. He is not diaphoretic.   HENT:      Head: Normocephalic.   Eyes:      General: No scleral icterus.     Extraocular Movements: Extraocular movements intact.      Conjunctiva/sclera: Conjunctivae normal.   Neck:      Vascular: No JVD.      Trachea: No tracheal deviation.   Cardiovascular:      Rate and Rhythm: Normal rate and regular rhythm.      Heart sounds: Normal heart sounds. No murmur heard.  Pulmonary:      Effort:  Pulmonary effort is normal. No respiratory distress.      Breath sounds: Normal breath sounds. No wheezing.   Musculoskeletal:         General: Swelling, tenderness and deformity present.      Cervical back: Neck supple.      Comments: Scattered small heberden and kyung nodes of the fingers, no basilar joint tenderness of the 1st CMC bilaterally but slight squaring present.   + slight swelling and tenderness b/l wrists- left more swollen;restriction in extension - improved   No swelling, tenderness, redness or restriction of motion of the DIPs, PIPs, MCPs.  Left elbow tender; swelling over medial aspect   Knees, ankles and bones of feet benign on exam but ankles quite tender- left along medial malleolus   Lymphedema of left leg   Lymphadenopathy:      Cervical: No cervical adenopathy.   Skin:     General: Skin is warm and dry.      Findings: No erythema or rash.      Comments: Multiple hyperpigmented spots  No periungal erythema  No nail pitting  Right arm in compression sleeve d/t lymphedema   Neurological:      Mental Status: He is alert and oriented to person, place, and time.      Cranial Nerves: No cranial nerve deficit.   Psychiatric:         Mood and Affect: Mood normal.         Behavior: Behavior normal.       ?  Radiology review:       Formatting of this note might be different from the original.  IMPRESSIONS:  1.  Subtle minimally depressed cortical fracture medial femoral condyle with developing underlying cyst formation and moderate edema, the degree of edema has decreased since the prior examination but the subtle cortical fracture is new  2.  Horizontal tearing posterior horn and body segment medial meniscus with developing flap of meniscal tissue caudal to the joint space at the posterior medial corner, similar to the prior examination  3.  Patellofemoral chondral thinning and fissuring without subchondral edema  4.  Joint effusion and Baker's cyst    CLINICAL INDICATION: Knee pain    EXAMINATION:  MRI left knee    Comparison: MRI 3/12/2024    FINDINGS:   Medial compartment: There is a horizontal tear of the posterior horn of the medial meniscus extending to the body segment and a developing flap of meniscus tissue beginning to extend caudal to the joint line at the posterior medial corner.  This tear is   similar to what was seen on the prior examination.  There is a small cortical fracture of the medial femoral condylar cortical surface at the site of the nondepressed trabecular fracture seen on the prior examination with very minimal, less than 1 mm  depression.  There is some underlying fluid signal which could developing subchondral cyst.  Moderate edema is seen throughout the remainder of the medial femoral condyle, the degree of edema has improved since the prior examination.  Medial tibial  plateau articular cartilage and subchondral bone is intact.  Medial collateral ligament is intact    Lateral compartment: Lateral meniscus is not torn.  No chondral defect or subchondral edema are present laterally.  Lateral collateral ligament and popliteus tendons are intact    Intercondylar notch: Anterior and posterior cruciate ligaments are intact    Patellofemoral articulation: Distal quadriceps tendon and patellar tendons are intact.  Chondral thinning and fissuring is present at the patellofemoral articulation without subchondral edema.  A moderate joint effusion is present with a small Baker's  cyst.  Electronically signed by GISELLE ZHOU  Exam End: 06/26/24  7:09 PM       Exam: MRI WRIST LT W/O CONTRAST   CPT Code(s): 77645 - MRI JOINT UPR EXTREM W/O DYE     CLINICAL HISTORY: Inflammatory arthritis (M19.90). Left wrist pain (M25.532).     COMPARISON: Left wrist MRI, 6/25/2021, from [facility].     TECHNIQUE: Left wrist MRI without contrast. Exam performed on an ultra high field 3.0 Yanelis MR scanner.     FINDINGS: Multiple subchondral cysts are identified in the distal radius underlying the radiocarpal  joint measuring 1.7 cm and smaller. Mild subchondral bone marrow edema is also present in the distal radius underlying the radiocarpal joint. Osteophytes   in the dorsal margin of the distal radius. Severe articular cartilage thinning at the radiocarpal joint. Severe articular cartilage thinning at the distal radioulnar joint with osteophytes at the margins of the margin of the distal ulna. The ulnar   styloid is blunted, and there is mild bone marrow edema in the distal ulna. Small joint effusion and synovitis at the distal radioulnar joint, in addition to a 1.0 cm corticated ossific loose body in the volar aspect of the distal radioulnar joint.   Subcentimeter subchondral cysts versus erosions and mild subchondral bone marrow edema in the scaphoid and lunate. Subcentimeter subchondral cysts in the distal capitate and in the second and third metacarpal bases underlying the carpometacarpal joints,   in addition to mild subchondral bone marrow edema in the second metacarpal base underlying the second carpometacarpal joint. Osteophytes and moderate joint space narrowing at the first carpometacarpal joint. A subcentimeter corticated intra-articular   loose body is identified along the ulnar margin of the first carpometacarpal joint. Small midcarpal joint effusion. No fracture or dislocation are identified.     The distal ulna abuts the proximal surface of the lunate. Extensive full-thickness tearing/maceration of the triangular fibrocartilage.     The scapholunate interval is mildly widened measuring 4 mm. There is mild ill-defined increased proton density signal in the scapholunate ligament, most pronounced in the membranous band, although no discrete fluid signal tear is identified in the   scapholunate ligament. The lunotriquetral ligament appears grossly intact, and the lunotriquetral interval is normal.     Subcentimeter ganglion cysts along the course of the radioscaphocapitate and long radiolunate ligaments near  the radial attachment. Subcentimeter ganglion cysts along the volar margin of the capitate-hamate articulation, third carpometacarpal joint, and   triquetral-hamate articulation.     Mild to moderate tendinosis of the extensor carpi ulnaris tendon. Small amount of fluid in the extensor carpi ulnaris tendon sheath. The remainder of the left wrist extensor tendons and the left wrist flexor tendons are intact and normal in caliber. The   mild ill-defined increased proton density signal in the tendons in extensor compartments 3 and 4 is less conspicuous on the axial T2-weighted sequence and is likely due to \"magic angle\" effect rather than mild tendinosis.     Neurovascular structures are unremarkable.     IMPRESSION:   1. Large subchondral cysts in the distal radius and mild subchondral bone marrow edema underlying the radiocarpal joint. Severe articular cartilage thinning at the radiocarpal joint and the distal radioulnar joint. Blunted ulnar styloid which appears   eroded. Subchondral cysts versus erosions and mild subchondral bone marrow edema in the scaphoid and lunate. Subchondral cysts at the second and third carpometacarpal joints. Osteophytes at the distal radioulnar joint, radiocarpal joint, and first   carpometacarpal joint. Small joint effusion and synovitis at the distal radioulnar joint, corticated ossific loose bodies in the distal radioulnar joint and first carpometacarpal joint, and small midcarpal joint effusion. 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 Similar findings were noted on the prior MRI, although the previously seen extraosseous   ganglion cyst at the volar margin of the distal radius is no longer present.     2. Extensive full-thickness tearing/maceration of the triangular fibrocartilage.     3. Mildly widened scapholunate interval with  ill-defined increased proton density signal compatible with degeneration of the scapholunate ligament, although no discrete fluid signal tear is identified in the scapholunate ligament. This is similar to the   prior MRI.     4. Mild to moderate tendinosis of the extensor carpi ulnaris tendon, and mild extensor carpi ulnaris tenosynovitis.     5. Subcentimeter ganglion cysts along the course of the radioscaphocapitate and long radiolunate ligaments near the radial attachment may reflect the sequela of sprains of the volar extrinsic carpal ligaments.     Interpreting Radiologist:     Emerson Fish M.D.   Electronically Signed: 04/20/2023 09:47 AM   PROCEDURE:  MRI WRIST (W+WO), LEFT (CPT=73223)       COMPARISON:  None.       INDICATIONS:  M67.432 Ganglion cyst of wrist, left M25.832 Mass of joint of left wrist       TECHNIQUE:  A complete multi-planar MRI of the wrist was performed with and without intravenous gadolinium contrast.         PATIENT STATED HISTORY:(As transcribed by Technologist)  Patient has a cyst in his left wrist and complains of wrist pain        CONTRAST USED:  20 mL of Dotarem       FINDINGS:     FERMIN COMPARTMENTS:  There is extensive subchondral cyst formation throughout the radius and also in the scaphoid bone with extensive subchondral reactive edema.  There is a marker placed over the radial styloid.  There is extension of subchondral cysts   into the soft tissues anterior to the distal radius consistent with a ganglion cyst.  Soft tissue component of the ganglion cyst measures maximum dimensions of 1.3 x 1 x 1.2 cm.  There is no fracture.  There is mild subchondral reactive edema in the   lunate bone in addition to the scaphoid with mild subchondral cyst formation also evident in the volar aspect of the lunate bone near articulation with the capitate.     MUSCULATURE:  No strain, edema, or atrophy.  No abnormal enhancement.   INTERCARPAL LIGAMENTS:  There is degeneration of the scapholunate  ligament without full-thickness tear.  The lunotriquetral ligaments are intact.   EXTRINSIC LIGAMENTS:  Radioulnar and radiocarpal ligaments are within normal limits.     TFCC:  There is diffuse degeneration of the triangular fibrocartilage.  There is no typical full-thickness tear.   TENDONS:  Flexor and extensor tendons are unremarkable.   CARPAL TUNNEL:  Normal appearance of the median nerve.  No mass lesions.  No evidence to suggest Carpal Tunnel Syndrome.  No abnormal enhancement.   GUYON'S CANAL:  Normal appearance of the ulnar nerve.  No mass lesions.  No abnormal enhancement.                        Impression   CONCLUSION:     1. There is extensive subchondral cyst formation in the distal radius and in the scaphoid bone as well as the lunate.  There is extension of ganglion cyst anterior to the distal radius.  There is no solid mass detected.   2. There is degeneration of the scapholunate ligament and the triangular fibrocartilage without full-thickness tear.           Dictated by (CST): Emerson Strauss MD on 6/27/2021 at 10:01 PM       Finalized by (CST): Emerson Strauss MD on 6/27/2021 at 10:06 PM            PROCEDURE:  XR FOOT WEIGHTBEARING (3 VIEWS), RIGHT (CPT=73630)     INDICATIONS:  M25.50 Pain in unspecified joint     COMPARISON:  None.     PATIENT STATED HISTORY: (As transcribed by Technologist)  Bilateral ankle and foot pain, no recent injury.         FINDINGS:    A bony fracture is not seen.  Postsurgical changes of fixation involving the midfoot.  There is a fracture involving the lateral most fusion screws.  Correlation with any other prior imaging is suggested.          =====  CONCLUSION:  Fracture of a metallic fixation device along the lateral aspect of midfoot.  Correlation with any other prior imaging is suggested.  If there is concern for acute fracture then consider follow-up imaging with CT.        Dictated by (CST): Sarath Patel MD on 8/07/2020 at 6:06 PM         PROCEDURE:  XR FOOT  WEIGHTBEARING (3 VIEWS), LEFT (CPT=73630)     INDICATIONS:  M25.50 Pain in unspecified joint     COMPARISON:  None.     PATIENT STATED HISTORY: (As transcribed by Technologist)  Bilateral ankle and foot pain, no recent injury.         FINDINGS:    Postsurgical changes noted of the midfoot.  Bony fusion is noted.  No bony fracture is seen.  Mild osteopenia is present.  Prominent osteophyte formation noted over the midfoot.  There is a fracture of a fusion screw anteriorly which is best appreciated   on the lateral view.          =====  CONCLUSION:  Fracture of a fusion screw anteriorly within the midfoot.  This is age indeterminate.  Correlation with any other prior images is suggested.     Moderate degenerative changes with osteophyte formation seen at the talonavicular joint.     Dictated by (CST): Sarath Patel MD on 8/07/2020 at 6:13 PM      PROCEDURE:  XR KNEE ROUTINE (3 VIEWS), LEFT (CPT=73562)     TECHNIQUE:  Three views were obtained including patellar view.     COMPARISON:  None.     INDICATIONS:  Pain     PATIENT STATED HISTORY: (As transcribed by Technologist)  Left knee pain, no injury.          FINDINGS:    No joint effusion is seen.  No acute fracture or dislocation is seen.  Normal bone mineral density.  Normal alignment.          =====  CONCLUSION:  No acute fracture or dislocation is seen.  If clinical symptoms persist recommend followup radiographs or MRI.    Dictated by (CST): Sarath Patel MD on 8/07/2020 at 6:05 PM      PROCEDURE:  MRI SPINE LUMBAR (CPT=72148)     COMPARISON:  [facility] , CT ABDOMEN+PELVIS KIDNEYSTONE 2D RNDR(NO IV,NO ORAL)(CPT=74176), 12/09/2018, 0:33.     INDICATIONS:  M54.5 Low back pain.  1 episode of loss of bowel control.  Low back pain.     TECHNIQUE:  Multiplanar T1 and T2 weighted images including fat suppression sequences.  Images acquired in sagittal and axial planes.       PATIENT STATED HISTORY: (As transcribed by Technologist)  Patient has low back pain that radiates down  the left buttocks for one month with no injury.         FINDINGS:       There are degenerative disc changes with loss of T2 disc signal, as well as facet changes present in the lumbar spine, of varying degrees at different levels, described individually below.     DECRIPTION OF INDIVIDUAL DISC LEVELS     Partially visualized lower thoracic level, and thoracolumbar junction:     T10-11 shows small disc bulging.  T11-12 shows greater disc bulging measuring 5 mm AP dimension, causing focal effacement of the thecal sac but no sign of compression of the lower thoracic spinal cord.  These thoracic levels are only visualized on the   sagittal images including the lower thoracic, not visualize on axial images.  The lower visible thoracic spinal cord and conus medullaris show no compression or signal abnormality peer     Lumbar and lumbosacral:     L1-L2:  Mild disc bulging in disc narrowing.  Central canal and neuroforamina patent, no protrusion.     L2-L3:  Normal     L3-L4:  Mild disc bulging and facet arthropathy.  No stenosis of the central canal or neural foramina.     L4-L5:  Greater disc degeneration at this level with loss of disc height, and moderate disc bulging.  The disc bulging is asymmetric slightly greater to the right but no focal protrusion.  Mild bilateral neural foraminal stenosis from combination of disc   bulging encroaching upon the neural foramen and mild facet arthropathy at this level as well.  No high-grade stenosis however.     L5-S1:  Bilateral L5 pars defect, with grade 1 anterior subluxation L5.  Moderate disc degeneration and disc bulging L5-S1.  The central canal appears patent but there is neural foraminal stenosis worse on the left at this level.  Stenosis considered at   least moderate on the left, milder on the right.     No evidence for acute fracture visible in the lower thoracic spine, thoracolumbar junction and lumbar spine.     No paraspinal mass.     The lower visible thoracic  spinal cord, and conus medullaris appear unremarkable.     No evidence for metastatic disease, osteomyelitis, discitis or other aggressive process in the spine.      Only seen at the very lowest margin of the large field of view localizer series is a cystic mass in the right pelvis/adnexa, also observed on CT of the abdomen from 19 days earlier, likely cystic mass of ovarian etiology.  This apparently with stable   when compared with earlier CT imaging.  This can be followed up if clinically needed.      =====  CONCLUSION:  Bilateral L5 pars defect with grade 1 anterior subluxation L5, and bilateral foraminal stenosis worse on the left.  Milder degenerative disc and facet changes at other levels as described above.        Dictated by: Miguelito Nesbitt MD on 12/28/2018 at 14:23       Approved by: Miguelito Nesbitt MD       Labs:  Lab Results   Component Value Date    WBC 3.5 (L) 08/15/2024    RBC 4.52 08/15/2024    HGB 14.8 08/15/2024    HCT 42.2 08/15/2024    .0 (L) 08/15/2024    MCV 93.4 08/15/2024    MCH 32.7 08/15/2024    MCHC 35.1 08/15/2024    RDW 12.8 08/15/2024    NEPRELIM 1.79 08/15/2024    NEPERCENT 51.4 08/15/2024    LYPERCENT 23.9 08/15/2024    MOPERCENT 18.1 08/15/2024    EOPERCENT 4.6 08/15/2024    BAPERCENT 0.9 08/15/2024    NE 1.79 08/15/2024    LYMABS 0.83 (L) 08/15/2024    MOABSO 0.63 08/15/2024    EOABSO 0.16 08/15/2024    BAABSO 0.03 08/15/2024     Lab Results   Component Value Date     (H) 08/15/2024    BUN 15 08/15/2024    BUNCREA 15.9 11/28/2022    CREATSERUM 1.00 08/15/2024    CREATSERUM 1.00 08/15/2024    ANIONGAP 4 08/15/2024    GFR 70 08/09/2017    GFRNAA 79 07/20/2022    GFRAA 91 07/20/2022    CA 9.1 08/15/2024    CA 9.4 08/15/2024    OSMOCALC 289 08/15/2024    ALKPHO 74 08/15/2024    AST 35 (H) 08/15/2024    ALT 37 08/15/2024    BILT 2.2 (H) 08/15/2024    TP 6.6 08/15/2024    ALB 4.5 08/15/2024    GLOBULIN 2.1 08/15/2024    AGRATIO 2.2 (H) 06/04/2011     08/15/2024    K  4.0 08/15/2024     08/15/2024    CO2 27.0 08/15/2024       Additional Labs:  07/2022  ESR 11 normal  CRP normal   IgA and IgG normal  IgM 29.4 low    12/2021  CRP normal  ESR normal  C3 and C4 normal  Total complement borderline low  SPEP grossly normal except borderline low beta globulin  IgA and IgG normal  IgM 27.7 low    06/2021  TB negative  G6PD normal  ESR 9 normal  CRP normal  Hepatitis B serologies negative  Hepatitis C serology negative    02/2021  CRP normal  ESR 10 normal    10/2020  Uric acid 5.7  SPEP grossly normal   HLAB27 negative  CRP normal   ESR normal  ARNAUD negative  BAMBI panel negative  RF negative  CCP negative     09/2020  Blood culture neg x 2    08/19/2020  ESR 8 normal  Blood culture neg x 2    08/05/2020  HsCRP normal   ESR 6 normal   Uric acid 7.1 borderline     10/2019  ARNAUD screen negative  BAMBI panel negative  ESR 13 normal  CRP normal  CCP negative       Ruba Richards DO  [facility]  03/12/2025

## 2025-05-09 ENCOUNTER — APPOINTMENT (OUTPATIENT)
Dept: CT IMAGING | Age: 70
End: 2025-05-09
Attending: EMERGENCY MEDICINE
Payer: COMMERCIAL

## 2025-05-09 ENCOUNTER — TELEPHONE (OUTPATIENT)
Dept: FAMILY MEDICINE CLINIC | Facility: CLINIC | Age: 70
End: 2025-05-09

## 2025-05-09 ENCOUNTER — LAB ENCOUNTER (OUTPATIENT)
Dept: LAB | Age: 70
End: 2025-05-09
Attending: INTERNAL MEDICINE
Payer: COMMERCIAL

## 2025-05-09 ENCOUNTER — HOSPITAL ENCOUNTER (OUTPATIENT)
Age: 70
Discharge: HOME | End: 2025-05-09
Attending: EMERGENCY MEDICINE
Payer: COMMERCIAL

## 2025-05-09 VITALS
BODY MASS INDEX: 31 KG/M2 | TEMPERATURE: 98 F | OXYGEN SATURATION: 97 % | HEART RATE: 66 BPM | RESPIRATION RATE: 18 BRPM | DIASTOLIC BLOOD PRESSURE: 103 MMHG | SYSTOLIC BLOOD PRESSURE: 163 MMHG | WEIGHT: 211 LBS

## 2025-05-09 DIAGNOSIS — Z79.899 ON COLCHICINE THERAPY: ICD-10-CM

## 2025-05-09 DIAGNOSIS — G45.4 TGA (TRANSIENT GLOBAL AMNESIA): Primary | ICD-10-CM

## 2025-05-09 DIAGNOSIS — M25.50 POLYARTHRALGIA: ICD-10-CM

## 2025-05-09 DIAGNOSIS — M19.90 INFLAMMATORY ARTHRITIS: ICD-10-CM

## 2025-05-09 LAB
ALBUMIN SERPL-MCNC: 4.6 G/DL (ref 3.2–4.8)
ALBUMIN/GLOB SERPL: 2.1 {RATIO} (ref 1–2)
ALP LIVER SERPL-CCNC: 86 U/L (ref 45–117)
ALT SERPL-CCNC: 47 U/L (ref 10–49)
ANION GAP SERPL CALC-SCNC: 11 MMOL/L (ref 0–18)
AST SERPL-CCNC: 38 U/L (ref ?–34)
BASOPHILS # BLD AUTO: 0.04 X10(3) UL (ref 0–0.2)
BASOPHILS NFR BLD AUTO: 0.9 %
BILIRUB SERPL-MCNC: 2.6 MG/DL (ref 0.2–1.1)
BUN BLD-MCNC: 12 MG/DL (ref 9–23)
CALCIUM BLD-MCNC: 9.6 MG/DL (ref 8.7–10.6)
CHLORIDE SERPL-SCNC: 104 MMOL/L (ref 98–112)
CO2 SERPL-SCNC: 26 MMOL/L (ref 21–32)
CREAT BLD-MCNC: 1.05 MG/DL (ref 0.7–1.3)
CRP SERPL-MCNC: <0.4 MG/DL (ref ?–0.5)
EGFRCR SERPLBLD CKD-EPI 2021: 77 ML/MIN/1.73M2 (ref 60–?)
EOSINOPHIL # BLD AUTO: 0.28 X10(3) UL (ref 0–0.7)
EOSINOPHIL NFR BLD AUTO: 6.6 %
ERYTHROCYTE [DISTWIDTH] IN BLOOD BY AUTOMATED COUNT: 12.7 %
ERYTHROCYTE [SEDIMENTATION RATE] IN BLOOD: 1 MM/HR (ref 0–20)
FASTING STATUS PATIENT QL REPORTED: YES
GLOBULIN PLAS-MCNC: 2.2 G/DL (ref 2–3.5)
GLUCOSE BLD-MCNC: 116 MG/DL (ref 70–99)
HCT VFR BLD AUTO: 45.1 % (ref 39–53)
HGB BLD-MCNC: 15.8 G/DL (ref 13–17.5)
IMM GRANULOCYTES # BLD AUTO: 0.05 X10(3) UL (ref 0–1)
IMM GRANULOCYTES NFR BLD: 1.2 %
LYMPHOCYTES # BLD AUTO: 0.81 X10(3) UL (ref 1–4)
LYMPHOCYTES NFR BLD AUTO: 19.2 %
MCH RBC QN AUTO: 32.4 PG (ref 26–34)
MCHC RBC AUTO-ENTMCNC: 35 G/DL (ref 31–37)
MCV RBC AUTO: 92.4 FL (ref 80–100)
MONOCYTES # BLD AUTO: 0.64 X10(3) UL (ref 0.1–1)
MONOCYTES NFR BLD AUTO: 15.2 %
NEUTROPHILS # BLD AUTO: 2.4 X10 (3) UL (ref 1.5–7.7)
NEUTROPHILS # BLD AUTO: 2.4 X10(3) UL (ref 1.5–7.7)
NEUTROPHILS NFR BLD AUTO: 56.9 %
OSMOLALITY SERPL CALC.SUM OF ELEC: 293 MOSM/KG (ref 275–295)
PLATELET # BLD AUTO: 144 10(3)UL (ref 150–450)
POTASSIUM SERPL-SCNC: 4.2 MMOL/L (ref 3.5–5.1)
PROT SERPL-MCNC: 6.8 G/DL (ref 5.7–8.2)
RBC # BLD AUTO: 4.88 X10(6)UL (ref 3.8–5.8)
SODIUM SERPL-SCNC: 141 MMOL/L (ref 136–145)
WBC # BLD AUTO: 4.2 X10(3) UL (ref 4–11)

## 2025-05-09 PROCEDURE — 85025 COMPLETE CBC W/AUTO DIFF WBC: CPT | Performed by: INTERNAL MEDICINE

## 2025-05-09 PROCEDURE — 99214 OFFICE O/P EST MOD 30 MIN: CPT

## 2025-05-09 PROCEDURE — 86140 C-REACTIVE PROTEIN: CPT | Performed by: INTERNAL MEDICINE

## 2025-05-09 PROCEDURE — 85652 RBC SED RATE AUTOMATED: CPT | Performed by: INTERNAL MEDICINE

## 2025-05-09 PROCEDURE — 80053 COMPREHEN METABOLIC PANEL: CPT | Performed by: INTERNAL MEDICINE

## 2025-05-09 PROCEDURE — 70450 CT HEAD/BRAIN W/O DYE: CPT | Performed by: EMERGENCY MEDICINE

## 2025-05-09 RX ORDER — METHYLPREDNISOLONE 4 MG/1
TABLET ORAL
Qty: 1 EACH | Refills: 0 | Status: SHIPPED | OUTPATIENT
Start: 2025-05-09 | End: 2025-05-19 | Stop reason: ALTCHOICE

## 2025-05-09 NOTE — TELEPHONE ENCOUNTER
Spoke with pt, pt informed nurse that he does not remember an hour of his day yesterday and is having trouble remembering computer passwords. Last BP today was 139/92, continues to have bilateral knee, feet and wrist pain. Pt also has mild headache today. Pt does not have slurred speech, denies weakness on one side of body, denies facial drooping. Pt advised to visit  for evaluation.

## 2025-05-09 NOTE — ED PROVIDER NOTES
Patient Seen in: [facility]      History   No chief complaint on file.    Stated Complaint: Brain fog; Ankle, Wrist and knee pain bilateral    Subjective:   HPI    69-year-old male complaining of brain fog patient describes that yesterday when he was returning home from work about an hour before he left he had an episode where he was having difficulty remembering some things.  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  He was able to drive home and did not have any issues trying to find his home.  He describes his had a mild headache since yesterday before the symptoms started is not severe there is no nausea vomiting he does have the mild pressure in his head now.  There is no fever he does have also some pain in both his knees his ankles and wrists he has had a history of some arthritis but never had this discomfort and this has been ongoing for about a week.  He describes it is difficult to walk due to the pain.  He had some labs drawn as an outpatient this morning which included CBC chemistry sed rate and CRP all those were unremarkable.  They were reviewed.  History of Present Illness               Objective:     No pertinent past medical history.            No pertinent past surgical history.              No pertinent social history.            Review of Systems    Positive for stated complaint: Brain fog; Ankle, Wrist and knee pain bilateral  Other systems are as noted in HPI.  Constitutional and vital signs reviewed.      All other systems reviewed and negative except as noted above.                  Physical Exam     ED Triage Vitals [05/09/25 1734]   BP (!) 163/103   Pulse 66   Resp 18   Temp 98 °F (36.7 °C)   Temp src Oral   SpO2 97 %   O2 Device None (Room air)       Current Vitals:   Vital Signs  BP: (!) 163/103  Pulse: 66  Resp: 18  Temp: 98 °F (36.7 °C)  Temp src: Oral    Oxygen Therapy  SpO2: 97  %  O2 Device: None (Room air)        Physical Exam  Patient is alert orient x 3 no acute distress HEENT exam pupils are equal round react light extra, Stecher oropharynx clear tympanic membranes normal the neck is supple's no Noguchi lymphadenopathy or JVD lungs are clear cardiovascular exam shows regular rate and rhythm without murmurs abdomen soft nontender extremities there is mild tenderness about the knees and wrists but no visible swelling or effusion there is no erythema.  Mental status alert and oriented ×3  Cranial nerves II through XII within normal limits  Motor function is intact in all 4 extremities  Sensation is intact in all 4 extremities  Cerebellar finger-nose and heel-to-shin are normal  Deep tendon reflexes are normal  Physical Exam                ED Course   Labs Reviewed - No data to display       Results         Labs including CBC chemistry CRP that were done as an outpatient earlier today are unremarkable.  CT BRAIN OR HEAD (CPT=70450)  Result Date: 5/9/2025  CONCLUSION:  Chronic changes in the brain, as described, but no acute intracranial bleed, or other acute intracranial process identified.    LOCATION:  [facility]   Dictated by (CST): Miguelito Nesbitt MD on 5/09/2025 at 6:18 PM     Finalized by (CST): Miguelito Nesbitt MD on 5/09/2025 at 6:27 PM       Images independently reviewed there is chronic changes but no acute findings.               MDM      Initial differential diagnosis considered but not limited to includes infection, migraine, transient global amnesia, TIA, stroke, rheumatoid arthritis, gout, postviral arthralgia.        Medical Decision Making    Patient appears to have had case of some transient global amnesia but seems to be back to his baseline now.  Imaging was unremarkable I advised him take a baby aspirin daily and follow-up with neurology next week return or go to the ER for recurrent or any strokelike symptoms.  He was also given a Medrol Dosepak for his joint pain although  there is no visible swelling of his joints and he has a rheumatologist to follow-up with as well.  Disposition and Plan     Clinical Impression:  1. TGA (transient global amnesia)    2. Polyarthralgia         Disposition:  Discharge  5/9/2025  6:48 pm    Follow-up:  Ramón Alxe MD  [address]  [place] [address]  [phone]    In 1 week      Edgardo Atwood DO  [address]  [address]  [place] [address]  [phone]    In 3 days            Medications Prescribed:  Current Discharge Medication List        START taking these medications    Details   methylPREDNISolone (MEDROL) 4 MG Oral Tablet Therapy Pack Dosepack: take as directed  Qty: 1 each, Refills: 0             Supplementary Documentation:

## 2025-05-09 NOTE — DISCHARGE INSTRUCTIONS
Take 1 baby aspirin daily.  Take the Medrol Dosepak as prescribed you can add Tylenol for pain as needed for joint pain.  Follow-up with neurology next week return for worsening symptoms or go to the ER for any strokelike symptoms.  Follow-up with your rheumatologist next week    When you call neurology asked to be seen in the TIA clinic for follow-up

## 2025-05-09 NOTE — TELEPHONE ENCOUNTER
Patient called needing an appointment today to be seen has been experience for a week now brain fog, high bp 155/94 pulse of 69.  Pain in knees, ankle, wrist pain.    Yesterday had memory loss and he couldn't remember anything

## 2025-05-12 DIAGNOSIS — M19.90 INFLAMMATORY ARTHRITIS: ICD-10-CM

## 2025-05-12 RX ORDER — COLCHICINE 0.6 MG/1
0.6 TABLET ORAL DAILY
Qty: 90 TABLET | Refills: 0 | Status: SHIPPED | OUTPATIENT
Start: 2025-05-12

## 2025-05-12 NOTE — TELEPHONE ENCOUNTER
Colchicine 0.6 mg    Future Appointments   Date Time Provider Department Center   5/14/2025  8:30 AM Jia Argueta PA-C [facility]   5/14/2025 10:20 AM Huber Chan MD [facility]   5/19/2025 11:00 AM Chavez Chapin NP [facility]   5/28/2025  8:45 AM Ramón Alex MD [facility]   6/19/2025  9:00 AM Jeff Cardenas DPM [id number]   8/15/2025  8:30 AM Cesar Arnold MD [facility]   9/10/2025  3:45 PM Willem Steele MD NP [facility]   10/14/2025  8:45 AM Cande Zimmerman MD [facility]   11/25/2025  9:00 AM Raj Woodward MD [facility]   1/14/2026 11:45 AM Ruba Richards DO [facility]     Last office visit: 3/12/2025    Last fill: 2/17/2025 90 tab, 0 refills

## 2025-05-13 NOTE — PROGRESS NOTES
[facility] PULMONARY  SLEEP PROGRESS NOTE        LILA WRAY is a 69 year old male who presents today for 6 month f/u      Chief Complaint   Patient presents with    Obstructive Sleep Apnea (PRICILA)     Sleep questioner , pt agreed to student being present during visit,              The following individual(s) verbally consented to be recorded using ambient AI listening technology and understand that they can each withdraw their consent to this listening technology at any point by asking the clinician to turn off or pause the recording:    Patient name: Lila Wray  Additional names:  NA      History of Present Illness  Mr. Gumaro Wray is a 69 year old male with insomnia and sleep apnea who presents for a follow-up regarding his CPAP machine and sleep issues.    He experienced issues with his new CPAP machine due to a broken plastic reservoir, which took weeks to replace. During this time, he used an older machine. He now has new supplies and uses nasal pillows, which are comfortable.        In bed 10-1030p  Wakes up 430a  Out of bed 530-6a  SL 30 min with ambien, other times a few hours despite taking ambien - once a week  Nighttime awakenings none  Naps none  Caffeine 1 cup coffee in AM, pepsi with lunch    Denies teeth grinding, leg cramps, restless legs, headaches, dry mouth, chest pain, thoracic back pain, bloating, drowsy driving, sleep walking, sleep talking  Tinnitus - persistent, unaffected by CPAP    DME company: [employer]   Mask type: nasal pillows     Lila Wray  2024 - 2025  Patient ID: [id number]  : 10/05/1955  Age: 69 years  Gender: Male  [address]  [address] [facility], [address]  Compliance Report  Usage 2024 - 2025  Usage days 89/90 days (99%)  >= 4 hours 88 days (98%)  < 4 hours 1 days (1%)  Usage hours 662 hours 1 minutes  Average usage (total days) 7 hours 21 minutes  Average usage (days used) 7  hours 26 minutes  Median usage (days used) 7 hours 24 minutes  Total used hours (value since last reset - 2025) 878 hours  AirSense 11 AutoSet  Serial number [id number]  Mode CPAP  Set pressure 10 cmH2O  EPR Fulltime  EPR level 1  Therapy  Leaks - L/min Median: 1.8 95th percentile: 22.2 Maximum: 33.5  Events per hour AI: 1.1 HI: 0.2 AHI: 1.3  Apnea Index Central: 0.1 Obstructive: 0.9 Unknown: 0.0  RERA Index 0.2  Cheyne-Baumann respiration (average duration per night) 0 minutes (0%)    (This is data from his old device)  LILA MARTIN  2025 - 2025  : 10/05/1955  Age: 69 years  [address], [address]  Compliance Report  Usage 2025 - 2025  Usage days 30/30 days (100%)  >= 4 hours 29 days (97%)  < 4 hours 1 days (3%)  Usage hours 216 hours 24 minutes  Average usage (total days) 7 hours 13 minutes  Average usage (days used) 7 hours 13 minutes  Median usage (days used) 7 hours 11 minutes  Total used hours (value since last reset - 2025) 20,922 hours  AirSense 10 Elite  Serial number [id number]  Mode CPAP  Set pressure 10 cmH2O  EPR Fulltime  EPR level 3  Therapy  Leaks - L/min Median: 3.6 95th percentile: 23.3 Maximum: 33.3  Events per hour AI: 1.6 HI: 0.2 AHI: 1.8  Apnea Index Central: 0.3 Obstructive: 1.2 Unknown: 0.1  RERA Index 0.1  Cheyne-Baumann respiration (average duration per night) 0 minutes (0%)    Patient: Sleep review of systems today: see form.      This is a 69 year old male who presents with the following symptoms, risk factors, behaviors or other items associated with sleep problems.    Sleep Apnea:   (Patient-Rptd) wakes with dry mouth; age 65 or older; previous sleep study; current CPAP use  Insomnia:  (Patient-Rptd) difficulty falling asleep; waking too early; non-refreshing sleep; not getting enough sleep; pain or discomfort  Restless Leg:  (Patient-Rptd) no symptoms or restless legs  Parasomnias:   (Patient-Rptd) no  symptoms of parasomnias  Daytime Problems:  (Patient-Rptd) fatigue    The patient's [place] Sleepiness score is (Patient-Rptd) 0/24.      4/11/2017 PSG  RESPIRATORY MEASURES: Respiratory monitoring showed obstructive apneas and hypopneas that were worse in the supine position. The apnea-hypopnea index was 10.2. The supine apnea-hypopnea index was 35. The non-supine apnea-hypopnea index was 7. Sleep-disordered breathing resulted in moderate oxyhemoglobin desaturations with a minesh of 77%.       Pt  PCP:  Ramón Alex MD  No referring provider defined for this encounter.           No data to display                  Past Medical History[1]  Past Surgical History[2]  Social History:  Social History     Social History Narrative    Not on file     Short Social Hx on File[3]  Family History:  Family History[4]  Allergies:  Allergies[5]  Current Meds:  Current Medications[6]   Counseling given: Not Answered         Problem List:  Problem List[7]    REVIEW OF SYSTEMS:   Review of Systems  See HPI     EXAM:   /80   Pulse 77   Resp 18   Ht 5' 9.5\" (1.765 m)   Wt 214 lb (97.1 kg)   SpO2 95%   BMI 31.15 kg/m²  Estimated body mass index is 31.15 kg/m² as calculated from the following:    Height as of this encounter: 5' 9.5\" (1.765 m).    Weight as of this encounter: 214 lb (97.1 kg).   Neck in inches:      Wt Readings from Last 6 Encounters:   05/14/25 214 lb (97.1 kg)   05/09/25 211 lb (95.7 kg)   03/12/25 215 lb (97.5 kg)   02/11/25 217 lb (98.4 kg)   02/06/25 204 lb (92.5 kg)   01/11/25 204 lb (92.5 kg)     BP Readings from Last 3 Encounters:   05/14/25 144/80   05/09/25 (!) 163/103   03/12/25 142/70     Pulse Readings from Last 3 Encounters:   05/14/25 77   05/09/25 66   03/12/25 72     SpO2 Readings from Last 3 Encounters:   05/14/25 95%   05/09/25 97%   03/12/25 96%      Patient weight not recorded    Vital signs reviewed.  Physical Exam  VITALS: BP- 144/80  GENERAL: Normal appearance.  HENT: Head normocephalic  and atraumatic. External ear normal bilaterally. Nose normal appearance.  PULMONARY: Pulmonary effort is normal. No respiratory distress. Lungs clear to auscultation bilaterally.  NEUROLOGICAL: He is alert and oriented to person, place, and time.  PSYCHIATRIC: Attention and perception normal. Mood and affect normal. Behavior normal and cooperative. Judgement normal.  CARDIOVASCULAR: Heart regular rate and rhythm, no murmurs.    Assessment & Plan  Insomnia  Chronic insomnia managed with Ambien, occasionally ineffective.  - Continue Ambien.  - Monitor effectiveness.    Hypertension  Elevated blood pressure, improved to 144/80 mmHg. Ongoing home monitoring.  - Continue home blood pressure monitoring.  - Adjust antihypertensive therapy if needed.    Chest pain  Dull, continuous chest pain x 2 weeks, likely non-cardiac, possibly musculoskeletal or related to recent symptoms.   - Monitor symptoms.  - Consider further evaluation if pain persists.    Obstructive sleep apnea- mild AHI 10  Sleep apnea well-managed with CPAP, reduced events to 1.3 per hour.  - Continue CPAP therapy.  - Ensure compliance with insurance requirements.  - Patient would like sooner follow up with Dr. Woodward to address possible pulmonary concerns related to his recent episode of transient global amnesia       There are no Patient Instructions on file for this visit.    Independent interpretation of Sleep Download as defined above.  Continue with Rx management of Sleep apnea with PAP therapy.    COMPLIANCE is required by insurance for 4 hours a night most nights of the week.    Advised if still with sleep apnea and not using CPAP has a 7 fold increase in risk of heart attack, stroke, abnormal heart rhythm  and death,  increased risk of driving accidents.     Advised to refrain from driving when sleepy.      Recommend weight loss, and maintain and optimal BMI with Exercise 30 minutes most days to target heart rate .     Advised patient to change  filters,masks,hoses  and tubes and equiptment on a  regular schedule.    Filters and seals shall be changed every 1 month,  Hoses every 3 months,   CPAP mask and humidifier chamber changed every 6 month  with the durable medical equipment provider.         Meds & Refills for this Visit:  Requested Prescriptions      No prescriptions requested or ordered in this encounter       Outcome: Parent verbalizes understanding. Parent is notified to call with any questions, complications, allergies, or worsening or changing symptoms.  Parent is to call with any side effects or complications from the treatments as a result of today.     \" This note was created utilizing Dragon speech recognition software.  Please excuse any grammatical errors. Call my office if you have any questions regarding this note. \"     Jia Argueta PA-C           [1]   Past Medical History:   Actinic keratosis    left wrist    Actinic keratosis    right forearm    Actinic keratosis    left neck    Actinic keratosis    left forearm    Anesthesia complication    HARD TO AWAKEN AFTER PROSTECTOMY    Arthritis    Atelectasis of both lungs    Basal cell carcinoma    left upper arm    Basal cell carcinoma    right shoulder; left forearm    Basal cell carcinoma    right forehead    Breast cancer (HCC)    right breast     Breast cancer in male (HCC)    Right    Calculus of kidney    left lower lobe     COPD (chronic obstructive pulmonary disease) (HCC)    Esophageal reflux    High blood pressure    High cholesterol    Hyperlipidemia    Incontinence    bladder     Lymphedema of arm    right; wears sleeve; NO BP/IV to right arm     Malignant melanoma (HCC)    left calf    FRIEDMAN (nonalcoholic steatohepatitis)    Dr Louis ; from tamoxifen - stable    Osteoarthritis    Personal history of arthritis    Prostate cancer (HCC)    [facility]    Sleep apnea    CPAP    Squamous cell carcinoma    in-situ, mid upper forehead    Squamous cell carcinoma    in-situ,  mid upper chest    Squamous cell carcinoma    in-situ, left upper arm; in-situ, left forearm    Squamous cell carcinoma    in-situ, right posterior neck; in-situ, right upper arm    Visual impairment    glasses   [2]   Past Surgical History:  Procedure Laterality Date    Colonoscopy N/A 12/05/2016    Procedure: COLONOSCOPY;  Surgeon: Cole Storey MD;  Location:  ENDOSCOPY    Colonoscopy  12/15/2018    Colonoscopy & polypectomy  12/05/2016    small polyp at prior polypectomy site     Foot/toes surgery proc unlisted  12/2012    Tarsil fusion, 8 screws and 21 plate    Hand/finger surgery unlisted      surgery of the thumb    Laparoscopy, surgical prostatectomy, retropubic radical, w/nerve sparing  01/2015    Leg/ankle surgery proc unlisted  1978 1989    first left then right    Mastectomy right  12/15/2015    SENTINEL AND AXILLARY LYMPH NODE REMOVAL, Do not use right arm for IV/BP    Other surgical history  01/2015    radical prostatectomy    Other surgical history Left 09/2022    melanoma removal calf    Other surgical history Left 10/2022    melanoma removal calf    Repair of biceps tendon at elbow  6/20012    Dr Reyes at [facility]    Repair rotator cuff,chronic      Shoulder surg proc unlisted Bilateral 2004/2012    right shoulder/left    Skin surgery Right 06/2022    shoulder skin cancer removal    Tonsillectomy     [3]   Social History  Socioeconomic History    Marital status:     Number of children: 3   Occupational History    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 ketone acetone exposure   Tobacco Use    Smoking status: Never    Smokeless tobacco: Never   Vaping Use    Vaping status: Never Used   Substance and Sexual Activity    Alcohol use: Yes     Comment: Social    Drug use: No   Other Topics Concern    Caffeine Concern Yes     Comment: coffee once a week 1 soda a day    Sleep Concern No    Exercise No    Seat Belt Yes   [4]    Family History  Problem Relation Age of Onset    Breast Cancer Sister 50        uterine ca after tamoxifen tx (dx 51)    Cancer Sister 51        uterine ca (due to tamoxifen)    Other (Benign breast biopsy) Sister 50    Gastro-Intestinal Disorder Father         Ulcers    Diabetes Father     Neurological Disorder Father         Parkinson's Disease    Other (Other) Father     Heart Disease Mother     Breast Cancer Mother 75        d.82    Other (Benign breast biopsy) Sister 45    Other (Benign breast biopsy) Sister     Breast Cancer Paternal Aunt         d.92    Cancer Maternal Uncle         brain ca; d.40's    Other (Gout) Brother     Cancer Self 59        prostate    Breast Cancer Self 60        invasive ductal ca   [5]   Allergies  Allergen Reactions    Levaquin [Levofloxacin] SWELLING     Other reaction(s): Other  Insomnia; swelling      Hibiclens RASH   [6]   Current Outpatient Medications   Medication Sig Dispense Refill    colchicine 0.6 MG Oral Tab Take 1 tablet (0.6 mg total) by mouth daily. 90 tablet 0    methylPREDNISolone (MEDROL) 4 MG Oral Tablet Therapy Pack Dosepack: take as directed 1 each 0    Calcipotriene (CALCITRENE) 0.005 % External Ointment Apply to arms and legs each evening. 60 g 2    clobetasol 0.05 % External Ointment Apply to arms and legs each morning 30 g 2    Zolpidem Tartrate ER 12.5 MG Oral Tab CR TAKE ONE TABLET BY MOUTH NIGHTLY AS NEEDED FOR SLEEP 90 tablet 1    ascorbic acid 100 MG Oral Tab Vitamin C 100 mg tablet, [RxNorm: 654637]      cyanocobalamin 1000 MCG Oral Tab cyanocobalamin (vit B-12) 1,000 mcg tablet, [RxNorm: 710270]      SILDENAFIL CITRATE 100 MG Oral Tab TAKE 1 TABLET BY MOUTH EVERY DAY AS NEEDED 30 tablet 1    azelastine 0.1 % Nasal Solution INHALE 2 SPRAYS NASALLY 2 TIMES DAILY      pantoprazole 40 MG Oral Tab EC Take 1 tablet (40 mg total) by mouth 2 (two) times daily.      atorvastatin 80 MG Oral Tab Take 1 tablet (80 mg total) by mouth daily. 90 tablet 3     Multiple Vitamin (ONE-DAILY MULTI VITAMINS) Oral Tab Take 1 tablet by mouth daily.      Cholecalciferol (VITAMIN D) 50 MCG (2000 UT) Oral Cap Take by mouth.     [7]   Patient Active Problem List  Diagnosis    History of prostate cancer    Pain in joint, ankle and foot    Mixed hyperlipidemia    Vitamin D deficiency    Insomnia    Hemangioma    Nephrolithiasis    History of breast cancer in male    Family history of malignant neoplasm of breast    PRICILA (obstructive sleep apnea)    Thrombocytopenia    ED (erectile dysfunction) of organic origin    PEDRO (stress urinary incontinence), male    Psoriasis    NAFLD (nonalcoholic fatty liver disease)    History of malignant melanoma of skin    Chronic obstructive pulmonary disease, unspecified (HCC)    SVT (supraventricular tachycardia) (HCC)    Lymphedema    Primary hypertension    Inflammatory polyarthritis (HCC)    Selective IgM deficiency (HCC)    Breast pain, left    History of right breast cancer

## 2025-05-13 NOTE — PROGRESS NOTES
Cesar Wray  2024 - 2025  Patient ID: [id number]  : 10/05/1955  Age: 69 years  Gender: Male  [address]  [employer]  [address] [facility], [address]  Compliance Report  Usage 2024 - 2025  Usage days 89/90 days (99%)  >= 4 hours 88 days (98%)  < 4 hours 1 days (1%)  Usage hours 662 hours 1 minutes  Average usage (total days) 7 hours 21 minutes  Average usage (days used) 7 hours 26 minutes  Median usage (days used) 7 hours 24 minutes  Total used hours (value since last reset - 2025) 878 hours  AirSense 11 AutoSet  Serial number [id number]  Mode CPAP  Set pressure 10 cmH2O  EPR Fulltime  EPR level 1  Therapy  Leaks - L/min Median: 1.8 95th percentile: 22.2 Maximum: 33.5  Events per hour AI: 1.1 HI: 0.2 AHI: 1.3  Apnea Index Central: 0.1 Obstructive: 0.9 Unknown: 0.0  RERA Index 0.2  Cheyne-Baumann respiration (average duration per night) 0 minutes (0%)

## 2025-05-14 ENCOUNTER — OFFICE VISIT (OUTPATIENT)
Facility: CLINIC | Age: 70
End: 2025-05-14
Payer: COMMERCIAL

## 2025-05-14 VITALS — HEART RATE: 66 BPM | SYSTOLIC BLOOD PRESSURE: 138 MMHG | RESPIRATION RATE: 16 BRPM | DIASTOLIC BLOOD PRESSURE: 84 MMHG

## 2025-05-14 VITALS
SYSTOLIC BLOOD PRESSURE: 144 MMHG | HEIGHT: 69.5 IN | BODY MASS INDEX: 30.98 KG/M2 | DIASTOLIC BLOOD PRESSURE: 80 MMHG | OXYGEN SATURATION: 95 % | HEART RATE: 77 BPM | RESPIRATION RATE: 18 BRPM | WEIGHT: 214 LBS

## 2025-05-14 DIAGNOSIS — R51.9 NONINTRACTABLE HEADACHE, UNSPECIFIED CHRONICITY PATTERN, UNSPECIFIED HEADACHE TYPE: ICD-10-CM

## 2025-05-14 DIAGNOSIS — R42 DIZZINESS: ICD-10-CM

## 2025-05-14 DIAGNOSIS — R20.0 BILATERAL LEG NUMBNESS: ICD-10-CM

## 2025-05-14 DIAGNOSIS — F51.01 PRIMARY INSOMNIA: ICD-10-CM

## 2025-05-14 DIAGNOSIS — R41.3 AMNESIA: Primary | ICD-10-CM

## 2025-05-14 DIAGNOSIS — G47.33 OSA (OBSTRUCTIVE SLEEP APNEA): Primary | ICD-10-CM

## 2025-05-14 PROCEDURE — 3079F DIAST BP 80-89 MM HG: CPT | Performed by: PHYSICIAN ASSISTANT

## 2025-05-14 PROCEDURE — 3079F DIAST BP 80-89 MM HG: CPT | Performed by: OTHER

## 2025-05-14 PROCEDURE — 3075F SYST BP GE 130 - 139MM HG: CPT | Performed by: OTHER

## 2025-05-14 PROCEDURE — 3008F BODY MASS INDEX DOCD: CPT | Performed by: PHYSICIAN ASSISTANT

## 2025-05-14 PROCEDURE — 99205 OFFICE O/P NEW HI 60 MIN: CPT | Performed by: OTHER

## 2025-05-14 PROCEDURE — 99213 OFFICE O/P EST LOW 20 MIN: CPT | Performed by: PHYSICIAN ASSISTANT

## 2025-05-14 PROCEDURE — 3077F SYST BP >= 140 MM HG: CPT | Performed by: PHYSICIAN ASSISTANT

## 2025-05-14 NOTE — PATIENT INSTRUCTIONS
After your visit at the [facility] office  today,  please direct any follow up questions or medication needs to the staff in our  [place] office so that your concerns may be promptly addressed.  We are available through Mobile2[employer] or at the numbers below:    The phone number is:   [phone] option #1    The fax number is:  [phone]    Your pharmacy should also send any requests electronically to the [place] office.     Refill policies:    Allow 2-3 business days for refills; controlled substances may take longer.  Contact your pharmacy at least 5 days prior to running out of medication and have them send an electronic request or submit request through the “request refill” option in your [employer] account.  Refills are not addressed on weekends; covering physicians do not authorize routine medications on weekends.  No narcotics or controlled substances are refilled after noon on Fridays or by on call physicians.  By law, narcotics must be electronically prescribed.  A 30 day supply with no refills is the maximum allowed.  If your prescription is due for a refill, you may be due for a follow up appointment.  To best provide you care, patients receiving routine medications need to be seen at least once a year.  Patients receiving narcotic/controlled substance medications need to be seen at least once every 3 months.  In the event that your preferred pharmacy does not have the requested medication in stock (e.g. Backordered), it is your responsibility to find another pharmacy that has the requested medication available.  We will gladly send a new prescription to that pharmacy at your request.    Scheduling Tests:    If your physician has ordered radiology tests such as MRI or CT scans, please contact Central Scheduling at [phone] right away to schedule the test.  Once scheduled, the [facility] Centralized Referral Team will work with your insurance carrier to obtain pre-certification or prior  authorization.  Depending on your insurance carrier, approval may take 3-10 days.  It is highly recommended patients assure they have received an authorization before having a test performed.  If test is done without insurance authorization, patient may be responsible for the entire amount billed.      Precertification and Prior Authorizations:  If your physician has recommended that you have a procedure or additional testing performed the [facility] Centralized Referral Team will contact your insurance carrier to obtain pre-certification or prior authorization.    You are strongly encouraged to contact your insurance carrier to verify that your procedure/test has been approved and is a COVERED benefit.  Although the [facility] Centralized Referral Team does its due diligence, the insurance carrier gives the disclaimer that \"Although the procedure is authorized, this does not guarantee payment.\"    Ultimately the patient is responsible for payment.   Thank you for your understanding in this matter.  Paperwork Completion:  If you require FMLA or disability paperwork for your recovery, please make sure to either drop it off or have it faxed to our office at [phone]. Be sure the form has your name and date of birth on it.  The form will be faxed to our Forms Department and they will complete it for you.  There is a 25$ fee for all forms that need to be filled out.  Please be aware there is a 10-14 day turnaround time.  You will need to sign a release of information (BALJIT) form if your paperwork does not come with one.  You may call the Forms Department with any questions at [phone].  Their fax number is [phone].

## 2025-05-14 NOTE — PROGRESS NOTES
Neurology History & Physical     ASSESSMENT & PLAN:      ICD-10-CM    1. Amnesia  R41.3 MRI BRAIN (W+WO) (CPT=70553)     EEG     MRA NECK (W+WO) (CPT=70549)     MRA BRAIN (CPT=70544)      2. Nonintractable headache, unspecified chronicity pattern, unspecified headache type  R51.9 MRI BRAIN (W+WO) (CPT=70553)     MRA NECK (W+WO) (CPT=70549)     MRA BRAIN (CPT=70544)      3. Dizziness  R42 MRI BRAIN (W+WO) (CPT=70553)     MRA NECK (W+WO) (CPT=70549)     MRA BRAIN (CPT=70544)      4. Bilateral leg numbness  R20.0 MRI BRAIN (W+WO) (CPT=70553)     MRA NECK (W+WO) (CPT=70549)     MRA BRAIN (CPT=70544)        Episode of amnesia and disorientation, with persistent headaches, dizziness, and BLE numbness.  This poses threat to bodily function. I recommended EEG, stat MRI brain / MRA head / MRA neck.    We discussed symptoms and signs that would warrant urgent / emergent eval.  I advised no driving until follow up with me.    Return in about 6 weeks (around 6/25/2025).       ~~~~~~~~~~~~~~~~~~~~~~~~~~~    CHIEF COMPLAINT / REASON FOR VISIT:    Chief Complaint   Patient presents with    Neurologic Problem     5/8/25 had an episode of lost time for approx 1.5 hours, memory loss at time. Did not improve until the next morning. Still not at baseline, still had brain fog.      HISTORY OBTAINED FROM:  Patient and others as above  Chart review    HISTORY:  Cesar Wray is a 69 year old male with prostate cancer, breast cancer, multiple skin cancers, inflammatory arthritis, PRICILA on CPAP (well managed), he was at work 5/8/25 (at his desk).  He lost about 1.5 hours of time, was still at his desk, had forgotten what emails he had sent, doesn't recall a phone conversation, couldn't remember kids' birthdays, wasn't oriented to time, couldn't remember passwords.  Was able to drive home and knew where his house was, he told his wife what happened.  He ate and went to bed, the next morning he still couldn't figure out how to get on his  computer (didn't remember password).  He went to his office and tried to work, but really wasn't able to.  He drove home, and then wife drove him to  in .  Head CT and labs unremarkable.  The orientation and memory recovered by the evening.      Has a persistent headache since then.  BP has been elevated in the past few weeks (was 160's in , was 140's at home).  Is able to work this week, maybe a little slower but able to complete his tasks (invoices, calls, projects etc) though he is double checking himself frequently.    Has had new numbness/tingling below knees bilaterally in the past week.  No vision or hearing changes.  No tinnitus or pulsatile symptoms.  + dizziness in past week; no balance or gait issues.    No tremors.  No other events of confusion, disorientation, memory loss, jerking, shaking, or twitching.  No neck or back pain.    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  Driving status:  Driving    DATA REVIEWED:  As documented in the history    May 2025  Sleep pulm note - PRICILA is well managed w CPAP  Head CT unremarkable (I independently analyzed and interpreted the above, and I agree with the reading physician report(s).)  ESR, CRP, CBC, CMP unremarkable    March 2025  Rheum note     Aug 2024  TSH unremarkable     PHYSICAL EXAMINATION:  /84   Pulse 66   Resp 16     Gen: in NAD  MSE: AAOx3, nl language, nl attn/conc, nl fund of knowledge; 3/3 object recall at 5 min  CN: PERRL, VFF; EOMI, no nystagmus; nl facial mvmt bilaterally; nl hearing bilaterally; nl palate elevation bilaterally; nl voice; nl shoulder shrug b/I; nl tongue movement  Motor: 5/5 x4; no drift; normal tone; no abnormal movements  Sensory: decrease vibration bilat  ankles  Coord: nl FTN b/I  Reflex: 1+ BUE and BLE  Gait: normal    Allergies   Allergen Reactions    Levaquin [Levofloxacin] SWELLING     Other reaction(s): Other  Insomnia; swelling      Hibiclens RASH       Current medications:  Current Outpatient Medications on File Prior to Visit   Medication Sig Dispense Refill    colchicine 0.6 MG Oral Tab Take 1 tablet (0.6 mg total) by mouth daily. 90 tablet 0    methylPREDNISolone (MEDROL) 4 MG Oral Tablet Therapy Pack Dosepack: take as directed 1 each 0    Calcipotriene (CALCITRENE) 0.005 % External Ointment Apply to arms and legs each evening. (Patient taking differently: as needed. Apply to arms and legs each evening.) 60 g 2    clobetasol 0.05 % External Ointment Apply to arms and legs each morning (Patient taking differently: Apply to arms and legs each morning as needed) 30 g 2    Zolpidem Tartrate ER 12.5 MG Oral Tab CR TAKE ONE TABLET BY MOUTH NIGHTLY AS NEEDED FOR SLEEP (Patient taking differently: Take 1 tablet (12.5 mg total) by mouth at bedtime. TAKE ONE TABLET BY MOUTH NIGHTLY AS NEEDED FOR SLEEP) 90 tablet 1    ascorbic acid 100 MG Oral Tab Vitamin C 100 mg tablet, [RxNorm: 796864] (Patient taking differently: Couple times per week)      cyanocobalamin 1000 MCG Oral Tab cyanocobalamin (vit B-12) 1,000 mcg tablet, [RxNorm: 826698] (Patient taking differently: 1-2x per week)      SILDENAFIL CITRATE 100 MG Oral Tab TAKE 1 TABLET BY MOUTH EVERY DAY AS NEEDED 30 tablet 1    azelastine 0.1 % Nasal Solution INHALE 2 SPRAYS NASALLY 2 TIMES DAILY (Patient taking differently: 4-5x per week)      pantoprazole 40 MG Oral Tab EC Take 1 tablet (40 mg total) by mouth 2 (two) times daily.      atorvastatin 80 MG Oral Tab Take 1 tablet (80 mg total) by mouth daily. 90 tablet 3    Multiple Vitamin (ONE-DAILY MULTI VITAMINS) Oral Tab Take 1 tablet by mouth daily.      Cholecalciferol (VITAMIN D) 50 MCG (2000 UT) Oral Cap Take by mouth. (Patient taking differently: Take by mouth.  Couples time per week)       No current facility-administered medications on file prior to visit.        Past Medical History:    Actinic keratosis    left wrist    Actinic keratosis    right forearm    Actinic keratosis    left neck    Actinic keratosis    left forearm    ALCOHOL USE    Allergic rhinitis    Anesthesia complication    HARD TO AWAKEN AFTER PROSTECTOMY    Arthritis    Atelectasis of both lungs    Basal cell carcinoma    left upper arm    Basal cell carcinoma    right shoulder; left forearm    Basal cell carcinoma    right forehead    Breast cancer (HCC)    right breast     Breast cancer in male (HCC)    Right    Calculus of kidney    left lower lobe     Cancer (HCC)    Breast     Carotid stenosis    Chronic cough    COPD (chronic obstructive pulmonary disease) (HCC)    Disorder of prostate    cancer    Esophageal reflux    Frequent UTI    prostate cancer    High blood pressure    High cholesterol    Hyperlipidemia    Incontinence    bladder     Indigestion    medications    Leaking of urine    prostate cancer    Leg swelling    Lymphedema of arm    right; wears sleeve; NO BP/IV to right arm     Malignant melanoma (HCC)    left calf    FRIEDMAN (nonalcoholic steatohepatitis)    Dr Louis ; from tamoxifen - stable    Osteoarthritis    Personal history of arthritis    Prostate cancer (HCC)    [facility]    Sleep apnea    CPAP    Squamous cell carcinoma    in-situ, mid upper forehead    Squamous cell carcinoma    in-situ, mid upper chest    Squamous cell carcinoma    in-situ, left upper arm; in-situ, left forearm    Squamous cell carcinoma    in-situ, right posterior neck; in-situ, right upper arm    Visual impairment    glasses       Past Surgical History:   Procedure Laterality Date    Breast surgery  12-    breast removed    Colonoscopy N/A 12/05/2016    Procedure: COLONOSCOPY;  Surgeon: Cole Storey MD;  Location:  ENDOSCOPY    Colonoscopy  12/15/2018    Colonoscopy &  polypectomy  12/05/2016    small polyp at prior polypectomy site     Foot/toes surgery proc unlisted  12/2012    Tarsil fusion, 8 screws and 21 plate    Hand/finger surgery unlisted      surgery of the thumb    Laparoscopy, surgical prostatectomy, retropubic radical, w/nerve sparing  01/2015    Leg/ankle surgery proc unlisted  1978 1989    first left then right    Theodore localization wire 1 site right (cpt=19281)      Mastectomy right  12/15/2015    SENTINEL AND AXILLARY LYMPH NODE REMOVAL, Do not use right arm for IV/BP    Needle biopsy right      Other surgical history  01/2015    radical prostatectomy    Other surgical history Left 09/2022    melanoma removal calf    Other surgical history Left 1/2015    melanoma removal calf    Prostate surgery  2015    Repair of biceps tendon at elbow  6/20012    Dr Reyes at [facility]    Repair rotator cuff,chronic      Shoulder surg proc unlisted Bilateral 2004/2012    right shoulder/left    Skin surgery Right 06/2022    shoulder skin cancer removal    Tonsillectomy      Vasectomy         Social History     Socioeconomic History    Marital status:     Number of children: 3   Occupational History    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, [employer] Methyl ethyl ketone acetone exposure   Tobacco Use    Smoking status: Never    Smokeless tobacco: Never   Vaping Use    Vaping status: Never Used   Substance and Sexual Activity    Alcohol use: Yes     Alcohol/week: 5.0 standard drinks of alcohol     Types: 1 Glasses of wine, 2 Cans of beer, 2 Standard drinks or equivalent per week     Comment: Social    Drug use: No   Other Topics Concern    Caffeine Concern No    Sleep Concern No    Exercise No    Seat Belt Yes       Family History   Problem Relation Age of Onset    Breast Cancer Sister 50        uterine ca after tamoxifen tx (dx 51)    Cancer Sister 51        uterine ca (due to tamoxifen)    Other (Benign breast biopsy) Sister  50    Gastro-Intestinal Disorder Father         Ulcers    Diabetes Father     Neurological Disorder Father         Parkinson's Disease    Other (Other) Father     Heart Disease Mother     Breast Cancer Mother 75        d.82    Cancer Mother     Obesity Mother     Other (Benign breast biopsy) Sister 45    Other (Benign breast biopsy) Sister     Breast Cancer Paternal Aunt         d.92    Cancer Maternal Uncle         brain ca; d.40's    Other (Gout) Brother     Cancer Self 59        prostate    Breast Cancer Self 60        invasive ductal ca    Prostate Cancer Self     Colon Polyps Self     Cancer Sister     Breast Cancer Sister     Uterine Cancer Sister     Cancer Maternal Uncle        Huber Chan MD, FAES, FAAN  Board-Certified in Neurology, Epilepsy, and Clinical Neurophysiology  [facility]

## 2025-05-14 NOTE — PROGRESS NOTES
(This is data from his old device)  LILA MARTIN.  2025 - 2025  : 10/05/1955  Age: 69 years  [address], [address]  Compliance Report  Usage 2025 - 2025  Usage days 30/30 days (100%)  >= 4 hours 29 days (97%)  < 4 hours 1 days (3%)  Usage hours 216 hours 24 minutes  Average usage (total days) 7 hours 13 minutes  Average usage (days used) 7 hours 13 minutes  Median usage (days used) 7 hours 11 minutes  Total used hours (value since last reset - 2025) 20,922 hours  AirSense 10 Elite  Serial number [id number]  Mode CPAP  Set pressure 10 cmH2O  EPR Fulltime  EPR level 3  Therapy  Leaks - L/min Median: 3.6 95th percentile: 23.3 Maximum: 33.3  Events per hour AI: 1.6 HI: 0.2 AHI: 1.8  Apnea Index Central: 0.3 Obstructive: 1.2 Unknown: 0.1  RERA Index 0.1  Cheyne-Baumann respiration (average duration per night) 0 minutes (0%)

## 2025-05-15 ENCOUNTER — HOSPITAL ENCOUNTER (OUTPATIENT)
Dept: MRI IMAGING | Age: 70
Discharge: HOME | End: 2025-05-15
Attending: Other
Payer: COMMERCIAL

## 2025-05-15 ENCOUNTER — NURSE ONLY (OUTPATIENT)
Dept: ELECTROPHYSIOLOGY | Facility: HOSPITAL | Age: 70
End: 2025-05-15
Attending: Other
Payer: COMMERCIAL

## 2025-05-15 DIAGNOSIS — R41.3 AMNESIA: ICD-10-CM

## 2025-05-15 DIAGNOSIS — R42 DIZZINESS: ICD-10-CM

## 2025-05-15 DIAGNOSIS — R20.0 BILATERAL LEG NUMBNESS: ICD-10-CM

## 2025-05-15 DIAGNOSIS — R51.9 NONINTRACTABLE HEADACHE, UNSPECIFIED CHRONICITY PATTERN, UNSPECIFIED HEADACHE TYPE: ICD-10-CM

## 2025-05-15 PROCEDURE — 70544 MR ANGIOGRAPHY HEAD W/O DYE: CPT | Performed by: OTHER

## 2025-05-15 PROCEDURE — 95813 EEG EXTND MNTR 61-119 MIN: CPT

## 2025-05-15 PROCEDURE — 70553 MRI BRAIN STEM W/O & W/DYE: CPT | Performed by: OTHER

## 2025-05-15 PROCEDURE — 70549 MR ANGIOGRAPH NECK W/O&W/DYE: CPT | Performed by: OTHER

## 2025-05-15 PROCEDURE — A9575 INJ GADOTERATE MEGLUMI 0.1ML: HCPCS | Performed by: OTHER

## 2025-05-15 RX ORDER — GADOTERATE MEGLUMINE 376.9 MG/ML
20 INJECTION INTRAVENOUS
Status: COMPLETED | OUTPATIENT
Start: 2025-05-15 | End: 2025-05-15

## 2025-05-15 RX ADMIN — GADOTERATE MEGLUMINE 20 ML: 376.9 INJECTION INTRAVENOUS at 08:08:00

## 2025-05-19 ENCOUNTER — LAB ENCOUNTER (OUTPATIENT)
Dept: LAB | Age: 70
End: 2025-05-19
Attending: NURSE PRACTITIONER
Payer: COMMERCIAL

## 2025-05-19 DIAGNOSIS — R30.0 DYSURIA: ICD-10-CM

## 2025-05-19 DIAGNOSIS — M25.50 ARTHRALGIA, UNSPECIFIED JOINT: ICD-10-CM

## 2025-05-19 LAB
BILIRUB UR QL STRIP.AUTO: NEGATIVE
CLARITY UR REFRACT.AUTO: CLEAR
COLOR UR AUTO: COLORLESS
GLUCOSE UR STRIP.AUTO-MCNC: NORMAL MG/DL
KETONES UR STRIP.AUTO-MCNC: NEGATIVE MG/DL
LEUKOCYTE ESTERASE UR QL STRIP.AUTO: NEGATIVE
NITRITE UR QL STRIP.AUTO: NEGATIVE
PH UR STRIP.AUTO: 6 [PH] (ref 5–8)
PROT UR STRIP.AUTO-MCNC: NEGATIVE MG/DL
RBC UR QL AUTO: NEGATIVE
RHEUMATOID FACT SERPL-ACNC: <3.5 IU/ML (ref ?–14)
SP GR UR STRIP.AUTO: 1.01 (ref 1–1.03)
UROBILINOGEN UR STRIP.AUTO-MCNC: NORMAL MG/DL

## 2025-05-19 PROCEDURE — 86225 DNA ANTIBODY NATIVE: CPT

## 2025-05-19 PROCEDURE — 86038 ANTINUCLEAR ANTIBODIES: CPT

## 2025-05-19 PROCEDURE — 36415 COLL VENOUS BLD VENIPUNCTURE: CPT

## 2025-05-19 PROCEDURE — 81003 URINALYSIS AUTO W/O SCOPE: CPT

## 2025-05-19 PROCEDURE — 86431 RHEUMATOID FACTOR QUANT: CPT

## 2025-05-19 NOTE — PROCEDURES
[facility]    VIDEO-ELECTROENCEPHALOGRAM (EEG) REPORT  Patient Name:  Cesar Wray   MRN / CSN:  [id number] / [id number]   Date of Birth / Age:  10/5/1955 /  69 year old   Encounter (Start) Date:  5/15/25    Study Duration: 2 hrs 9 min     HISTORY  This is a 69 year old male who presents with concern for seizure.    METHODS:  Twenty-two electrodes were applied according to the 10-20 electrode placement system on this audio-video EEG. EKG monitoring, monopolar and bipolar montages are routinely utilized. Video-EEG data were recorded continuously (or only for the initial portion, in the case of ambulatory studies) and stored digitally.    FINDINGS:  1) Background: A 10-11 Hz posterior dominant rhythm (PDR) was seen, symmetric and synchronous, reactive to eye opening.  2) Sleep: Stage N1 / drowsiness and Stage N2 were recorded, with normal, symmetric, and synchronous architecture.  3) Activation Procedures:                    Hyperventilation was not performed.                    Photic stimulation was performed and no significant changes were noted.  4) Abnormal Slowing:  Left > right temporal irregular slow activity  5) Epileptiform Activity:  None  6) Seizures:  None    FINAL INTERPRETATION:    This abnormal EEG recorded:  left > right temporal slow activity    Overall, the study is most consistent with nonspecific cerebral dysfunction in the left > right temporal regions.  This is common in older adults and not specific to any particular condition; no definite evidence of seizures or epilepsy is noted.    Huber Chan MD, FAALEXANDRA, FAAN  Board-Certified in Neurology, Epilepsy, and Clinical Neurophysiology  [facility]

## 2025-05-20 LAB
DSDNA IGG SERPL IA-ACNC: 2.6 IU/ML (ref ?–10)
ENA AB SER QL IA: 0.2 UG/L (ref ?–0.7)
ENA AB SER QL IA: NEGATIVE

## 2025-05-27 ENCOUNTER — PATIENT MESSAGE (OUTPATIENT)
Facility: CLINIC | Age: 70
End: 2025-05-27

## 2025-05-27 NOTE — ASSESSMENT & PLAN NOTE
Cholesterol shows Good control. Long term heart-healthy diet and lifestyle discussed and encouraged to reduce risk of cardiovascular disease.  10/20/2023: Cholesterol, Total 128; HDL Cholesterol 54; Triglycerides 68; LDL Cholesterol 60  Cholesterol Meds: atorvastatin Tabs - 80 MG  stable  Continue with current treatment plan     Orders:    Atorvastatin Calcium; Take 1 tablet (80 mg total) by mouth daily.  Dispense: 90 tablet; Refill: 3

## 2025-05-27 NOTE — TELEPHONE ENCOUNTER
FINAL INTERPRETATION:     This abnormal EEG recorded:  left > right temporal slow activity     Overall, the study is most consistent with nonspecific cerebral dysfunction in the left > right temporal regions.  This is common in older adults and not specific to any particular condition; no definite evidence of seizures or epilepsy is noted.

## 2025-05-27 NOTE — ASSESSMENT & PLAN NOTE
Seen in 2024, managed by rheumatolgy. Continue to monitor and continue present management

## 2025-05-27 NOTE — ASSESSMENT & PLAN NOTE
Last Platelet value was 144 done 5/9/2025. Lowest level in the last 10 years was 119. Stable, continue present management and continue to monitor for progression       Last Platelet value was 144 done 5/9/2025. Lowest level in the last 10 years was 119. Stable, continue present management and continue to monitor for progression

## 2025-05-27 NOTE — ASSESSMENT & PLAN NOTE
Dr Louis managing, possible due to Tamoxifen stable, continue present management and continue to monitor for progression

## 2025-05-27 NOTE — ASSESSMENT & PLAN NOTE
BP shows good control with last BP of 130/70. Continue lifestyle changes, diet, exercise and weight loss.   5/9/2025: Potassium 4.2; Creatinine 1.05; eGFR-Cr 77  BP Meds: losartan Tabs - 25 MG

## 2025-05-27 NOTE — ASSESSMENT & PLAN NOTE
Diagnosis 2022. Airduo for inhaler. Doing better. Continue to monitor and continue present management

## 2025-05-28 ENCOUNTER — OFFICE VISIT (OUTPATIENT)
Dept: FAMILY MEDICINE CLINIC | Facility: CLINIC | Age: 70
End: 2025-05-28
Payer: COMMERCIAL

## 2025-05-28 VITALS
WEIGHT: 215.63 LBS | RESPIRATION RATE: 14 BRPM | OXYGEN SATURATION: 96 % | SYSTOLIC BLOOD PRESSURE: 130 MMHG | BODY MASS INDEX: 31.94 KG/M2 | HEIGHT: 69 IN | DIASTOLIC BLOOD PRESSURE: 70 MMHG | HEART RATE: 70 BPM

## 2025-05-28 DIAGNOSIS — F51.01 PRIMARY INSOMNIA: ICD-10-CM

## 2025-05-28 DIAGNOSIS — M06.4 INFLAMMATORY POLYARTHRITIS (HCC): ICD-10-CM

## 2025-05-28 DIAGNOSIS — K76.0 NAFLD (NONALCOHOLIC FATTY LIVER DISEASE): ICD-10-CM

## 2025-05-28 DIAGNOSIS — E78.2 MIXED HYPERLIPIDEMIA: ICD-10-CM

## 2025-05-28 DIAGNOSIS — I47.10 SVT (SUPRAVENTRICULAR TACHYCARDIA) (HCC): ICD-10-CM

## 2025-05-28 DIAGNOSIS — J44.9 CHRONIC OBSTRUCTIVE PULMONARY DISEASE, UNSPECIFIED COPD TYPE (HCC): Primary | ICD-10-CM

## 2025-05-28 DIAGNOSIS — D69.6 DECREASED PLATELET COUNT: ICD-10-CM

## 2025-05-28 DIAGNOSIS — I10 PRIMARY HYPERTENSION: ICD-10-CM

## 2025-05-28 DIAGNOSIS — D80.4 SELECTIVE IGM DEFICIENCY (HCC): ICD-10-CM

## 2025-05-28 DIAGNOSIS — R07.9 LEFT-SIDED CHEST PAIN: ICD-10-CM

## 2025-05-28 DIAGNOSIS — D69.6 THROMBOCYTOPENIA: ICD-10-CM

## 2025-05-28 PROCEDURE — G2211 COMPLEX E/M VISIT ADD ON: HCPCS | Performed by: FAMILY MEDICINE

## 2025-05-28 PROCEDURE — 99214 OFFICE O/P EST MOD 30 MIN: CPT | Performed by: FAMILY MEDICINE

## 2025-05-28 PROCEDURE — 3075F SYST BP GE 130 - 139MM HG: CPT | Performed by: FAMILY MEDICINE

## 2025-05-28 PROCEDURE — 3008F BODY MASS INDEX DOCD: CPT | Performed by: FAMILY MEDICINE

## 2025-05-28 PROCEDURE — 3078F DIAST BP <80 MM HG: CPT | Performed by: FAMILY MEDICINE

## 2025-05-28 RX ORDER — GABAPENTIN 100 MG/1
100 CAPSULE ORAL 3 TIMES DAILY
Qty: 90 CAPSULE | Refills: 0 | Status: SHIPPED | OUTPATIENT
Start: 2025-05-28

## 2025-05-28 RX ORDER — ATORVASTATIN CALCIUM 80 MG/1
80 TABLET, FILM COATED ORAL DAILY
Qty: 90 TABLET | Refills: 3 | Status: SHIPPED | OUTPATIENT
Start: 2025-05-28

## 2025-05-28 RX ORDER — ZOLPIDEM TARTRATE 12.5 MG/1
12.5 TABLET, FILM COATED, EXTENDED RELEASE ORAL NIGHTLY
Qty: 90 TABLET | Refills: 1 | Status: SHIPPED | OUTPATIENT
Start: 2025-05-28

## 2025-05-28 NOTE — PROGRESS NOTES
Subjective:   Gumaro is a 69 year old male coming in for had concerns including Blood Pressure (6 months follow up ).   HPI  History of Present Illness  . Gumaro Wray is a 69 year old male who presents for a follow-up visit after experiencing an episode of amnesia and disorientation.    On May 8th or 9th, he experienced an episode of amnesia and disorientation at work, characterized by an inability to recall personal information such as his age, birth date, and phone password. He visited urgent care the following day and underwent a CT scan, MRI, and MR angiogram, which showed no stroke or significant issues. An EEG showed no seizure activity but indicated some slowing on the left more than the right, without definite evidence of seizure or epilepsy.    Since April 28th or 29th, he has been experiencing significant pain in his ankles, feet, knees, wrists, and left elbow, described as the worst it has ever been. This pain has been affecting his sleep, leading to fatigue and possibly contributing to his cognitive symptoms. He has been taking zolpidem nightly for sleep, but it has not been effective recently due to pain. Blood work, including inflammatory markers and tests for rheumatoid factor and mixed connective tissue disorder, returned normal results.    He reports persistent headaches and dizziness for the past few weeks, which have not resolved. He also describes a sensation of 'electricity' in both legs, predominantly the left, along with muscle twitches in his legs, hands, and left arm.    He mentions a constant pain in his chest area. He has a history of SVT treated two years ago and underwent a treadmill stress test in April 2022.    He works in an office doing accounting work and has been hosting several family events recently, including his mother's 90th birthday and his wife's [employer] party.      /70   Pulse 70   Resp 14   Ht 5' 9\" (1.753 m)   Wt 215 lb 9.6 oz (97.8 kg)   SpO2 96%    BMI 31.84 kg/m²  Body mass index is 31.84 kg/m².   Physical Exam  Vitals and nursing note reviewed.   Constitutional:       General: He is not in acute distress.     Appearance: Normal appearance.   HENT:      Head: Normocephalic and atraumatic.      Right Ear: Tympanic membrane and external ear normal.      Left Ear: Tympanic membrane and external ear normal.      Nose: Nose normal.      Mouth/Throat:      Mouth: Mucous membranes are moist.   Eyes:      Extraocular Movements: Extraocular movements intact.      Pupils: Pupils are equal, round, and reactive to light.   Cardiovascular:      Rate and Rhythm: Normal rate and regular rhythm.      Pulses: Normal pulses.           Carotid pulses are 2+ on the right side and 2+ on the left side.       Radial pulses are 2+ on the right side and 2+ on the left side.        Dorsalis pedis pulses are 2+ on the right side and 2+ on the left side.        Posterior tibial pulses are 2+ on the right side and 2+ on the left side.      Heart sounds: Normal heart sounds, S1 normal and S2 normal. No murmur heard.  Pulmonary:      Effort: Pulmonary effort is normal.      Breath sounds: Normal breath sounds.   Abdominal:      General: Abdomen is flat. Bowel sounds are normal. There is no distension.      Palpations: Abdomen is soft.   Musculoskeletal:         General: Normal range of motion.      Cervical back: Normal range of motion and neck supple.      Right lower leg: No edema.      Left lower leg: No edema.   Skin:     General: Skin is warm and dry.      Capillary Refill: Capillary refill takes less than 2 seconds.   Neurological:      General: No focal deficit present.      Mental Status: He is alert and oriented to person, place, and time.   Psychiatric:         Mood and Affect: Mood normal.         Behavior: Behavior normal.         Thought Content: Thought content normal.        Physical Exam  MUSCULOSKELETAL: Knee normal range of motion, Knee normal strength, Foot normal  strength  NEUROLOGICAL: Reflexes symmetric        Results  LABS  CRP: Low  ESR: Low  Rheumatoid factor: Negative  ARNAUD: Negative    RADIOLOGY  Brain MRI: No intracranial process, no evidence of acute infarcts, no stroke, signal hyperintensity within the cerebral white matter compatible with microvascular white matter ischemic changes or sequelae of remote inflammation. (05/09/2025)  Neck MRI: No hemodynamically significant stenosis or dissection of the carotids. (05/09/2025)    DIAGNOSTIC  EEG: No seizure activity, abnormal EEG with slowing on the left more than the right, consistent with nonspecific cerebral dysfunction in the left greater than right temporal regions, no definite evidence of seizure or epilepsy. (05/15/2025)     Assessment & Plan  Chronic obstructive pulmonary disease, unspecified COPD type (HCC)  Diagnosis 2022. Airduo for inhaler. Doing better. Continue to monitor and continue present management          Inflammatory polyarthritis (HCC)  Seen in 2024, managed by rheumatolgy. Continue to monitor and continue present management          Selective IgM deficiency (HCC)  Hematology managing, stable levels. Watch ofr infections.          SVT (supraventricular tachycardia) (HCC)  Seen 2023. Stable, continue present management and continue to monitor for progression          Thrombocytopenia  Decreased platelet count  Last Platelet value was 144 done 5/9/2025. Lowest level in the last 10 years was 119. Stable, continue present management and continue to monitor for progression       Last Platelet value was 144 done 5/9/2025. Lowest level in the last 10 years was 119. Stable, continue present management and continue to monitor for progression          Primary hypertension  BP shows good control with last BP of 130/70. Continue lifestyle changes, diet, exercise and weight loss.   5/9/2025: Potassium 4.2; Creatinine 1.05; eGFR-Cr 77  BP Meds: losartan Tabs - 25 MG             Mixed  hyperlipidemia  Cholesterol shows Good control. Long term heart-healthy diet and lifestyle discussed and encouraged to reduce risk of cardiovascular disease.  10/20/2023: Cholesterol, Total 128; HDL Cholesterol 54; Triglycerides 68; LDL Cholesterol 60  Cholesterol Meds: atorvastatin Tabs - 80 MG  stable  Continue with current treatment plan     Orders:    Atorvastatin Calcium; Take 1 tablet (80 mg total) by mouth daily.  Dispense: 90 tablet; Refill: 3    NAFLD (nonalcoholic fatty liver disease)  Dr Louis managing, possible due to Tamoxifen stable, continue present management and continue to monitor for progression          Primary insomnia  Continue zolpidem but will try gabapentin as an alternative and hopefully switch over to it if tolerated  Orders:    Zolpidem Tartrate ER; Take 1 tablet (12.5 mg total) by mouth at bedtime. TAKE ONE TABLET BY MOUTH NIGHTLY AS NEEDED FOR SLEEP  Dispense: 90 tablet; Refill: 1    Left-sided chest pain  Last treadmill stress test was 3 years ago.  Symptoms are atypical but based on risk factors and nature of this I would like to get a treadmill stress test.  Orders:    CARD TREADMILL STRESS, ADULT (CPT=93017); Future; Expected date: 05/28/2025       Other orders    Gabapentin; Take 1 capsule (100 mg total) by mouth 3 (three) times daily.  Dispense: 90 capsule; Refill: 0     Assessment & Plan  Joint pain in multiple sites  Significant pain in ankles, feet, knees, wrists, and left elbow with non-inflammatory etiology. Pain may affect sleep. Gabapentin considered for nerve pain.  - Continue meloxicam.  - Start gabapentin 100 mg at night.    Sleep disturbance due to pain  Sleep disturbance linked to pain, especially in the right knee. Zolpidem ineffective. Gabapentin considered for pain and sleep issues.  - Start gabapentin 100 mg at night, titrate as needed.  - Continue zolpidem as needed, transition to gabapentin if effective.    Dizziness and headache  Persistent dizziness and  headache with no definitive cause. Gabapentin may help due to effects on nerve pain and sleep.  - Start gabapentin 100 mg at night, titrate as needed.    Microvascular ischemic changes  MRI showed cerebral white matter hyperintensity, common in individuals over 50, associated with hypertension.    Nonspecific cerebral dysfunction  EEG showed slowing, consistent with nonspecific cerebral dysfunction. No seizure or epilepsy. Neurologist involved, awaiting input.  - Await neurologist's response for further management.    Follow-up  Multiple symptoms with various tests done. Follow-up with neurologist pending. Stress test considered for chest pain.  - Follow up with neurologist.  - Monitor gabapentin response, adjust dosage as needed.  I have changed Mr. Gumaro Wray's Zolpidem Tartrate ER. I am also having him start on gabapentin. Additionally, I am having him maintain his Vitamin D, One-Daily Multi Vitamins, pantoprazole, azelastine, Sildenafil Citrate, ascorbic acid, cyanocobalamin, Calcipotriene, clobetasol, colchicine, losartan, Meloxicam, and atorvastatin.       Return in about 6 months (around 11/28/2025) for Annual physical, recheck.

## 2025-05-28 NOTE — PROGRESS NOTES
The following individual(s) verbally consented to be recorded using ambient AI listening technology and understand that they can each withdraw their consent to this listening technology at any point by asking the clinician to turn off or pause the recording:    Patient name: Cesar Willem Kamala

## 2025-05-28 NOTE — TELEPHONE ENCOUNTER
Patient notified of EEG results as stated below and patient reminded of appointment on 6/2 at 10:40 in [place].    Huber Chan MD to Rosie Rivera Nurse                      5/28/25  1:19 PM    Can we let him know that overall nothing worrisome on MRI or EEG (the EEG findings are nonspecific, could be age related, this is a common finding and not specifically concerning for seizures).  We can discuss in detail at appt next week

## 2025-05-28 NOTE — ASSESSMENT & PLAN NOTE
Continue zolpidem but will try gabapentin as an alternative and hopefully switch over to it if tolerated  Orders:    Zolpidem Tartrate ER; Take 1 tablet (12.5 mg total) by mouth at bedtime. TAKE ONE TABLET BY MOUTH NIGHTLY AS NEEDED FOR SLEEP  Dispense: 90 tablet; Refill: 1

## 2025-06-02 ENCOUNTER — OFFICE VISIT (OUTPATIENT)
Facility: CLINIC | Age: 70
End: 2025-06-02
Payer: COMMERCIAL

## 2025-06-02 ENCOUNTER — TELEPHONE (OUTPATIENT)
Dept: NEUROLOGY | Facility: CLINIC | Age: 70
End: 2025-06-02

## 2025-06-02 VITALS — SYSTOLIC BLOOD PRESSURE: 138 MMHG | HEART RATE: 78 BPM | RESPIRATION RATE: 16 BRPM | DIASTOLIC BLOOD PRESSURE: 72 MMHG

## 2025-06-02 DIAGNOSIS — R42 DIZZINESS: ICD-10-CM

## 2025-06-02 DIAGNOSIS — R20.0 BILATERAL LEG NUMBNESS: ICD-10-CM

## 2025-06-02 DIAGNOSIS — R41.3 AMNESIA: Primary | ICD-10-CM

## 2025-06-02 DIAGNOSIS — G62.9 POLYNEUROPATHY: ICD-10-CM

## 2025-06-02 DIAGNOSIS — M79.602 LEFT ARM PAIN: ICD-10-CM

## 2025-06-02 DIAGNOSIS — R51.9 NONINTRACTABLE HEADACHE, UNSPECIFIED CHRONICITY PATTERN, UNSPECIFIED HEADACHE TYPE: ICD-10-CM

## 2025-06-02 PROCEDURE — 3075F SYST BP GE 130 - 139MM HG: CPT | Performed by: OTHER

## 2025-06-02 PROCEDURE — 99214 OFFICE O/P EST MOD 30 MIN: CPT | Performed by: OTHER

## 2025-06-02 PROCEDURE — 3078F DIAST BP <80 MM HG: CPT | Performed by: OTHER

## 2025-06-02 NOTE — PATIENT INSTRUCTIONS
After your visit at the [facility] office  today,  please direct any follow up questions or medication needs to the staff in our  [place] office so that your concerns may be promptly addressed.  We are available through [employer] or at the numbers below:    The phone number is:   [phone] option #1    The fax number is:  [phone]    Your pharmacy should also send any requests electronically to the [place] office.    Refill policies:    Allow 2-3 business days for refills; controlled substances may take longer.  Contact your pharmacy at least 5 days prior to running out of medication and have them send an electronic request or submit request through the “request refill” option in your [employer] account.  Refills are not addressed on weekends; covering physicians do not authorize routine medications on weekends.  No narcotics or controlled substances are refilled after noon on Fridays or by on call physicians.  By law, narcotics must be electronically prescribed.  A 30 day supply with no refills is the maximum allowed.  If your prescription is due for a refill, you may be due for a follow up appointment.  To best provide you care, patients receiving routine medications need to be seen at least once a year.  Patients receiving narcotic/controlled substance medications need to be seen at least once every 3 months.  In the event that your preferred pharmacy does not have the requested medication in stock (e.g. Backordered), it is your responsibility to find another pharmacy that has the requested medication available.  We will gladly send a new prescription to that pharmacy at your request.    Scheduling Tests:    If your physician has ordered radiology tests such as MRI or CT scans, please contact Central Scheduling at [phone] right away to schedule the test.  Once scheduled, the [facility] Centralized Referral Team will work with your insurance carrier to obtain pre-certification or prior authorization.   Depending on your insurance carrier, approval may take 3-10 days.  It is highly recommended patients assure they have received an authorization before having a test performed.  If test is done without insurance authorization, patient may be responsible for the entire amount billed.      Precertification and Prior Authorizations:  If your physician has recommended that you have a procedure or additional testing performed the [facility] Centralized Referral Team will contact your insurance carrier to obtain pre-certification or prior authorization.    You are strongly encouraged to contact your insurance carrier to verify that your procedure/test has been approved and is a COVERED benefit.  Although the [facility] Centralized Referral Team does its due diligence, the insurance carrier gives the disclaimer that \"Although the procedure is authorized, this does not guarantee payment.\"    Ultimately the patient is responsible for payment.   Thank you for your understanding in this matter.  Paperwork Completion:  If you require FMLA or disability paperwork for your recovery, please make sure to either drop it off or have it faxed to our office at [phone]. Be sure the form has your name and date of birth on it.  The form will be faxed to our Forms Department and they will complete it for you.  There is a 25$ fee for all forms that need to be filled out.  Please be aware there is a 10-14 day turnaround time.  You will need to sign a release of information (BALJIT) form if your paperwork does not come with one.  You may call the Forms Department with any questions at [phone].  Their fax number is [phone].

## 2025-06-02 NOTE — PROGRESS NOTES
Neurology History & Physical     ASSESSMENT & PLAN:      ICD-10-CM    1. Amnesia  R41.3 EEG      2. Bilateral leg numbness  R20.0 EMG (At [facility])      3. Nonintractable headache, unspecified chronicity pattern, unspecified headache type  R51.9       4. Dizziness  R42       5. Left arm pain  M79.602 EMG (At [facility])      6. Polyneuropathy  G62.9 Hemoglobin A1C     Monoclonal Protein Study     Vitamin B12 with Reflex to MMA     Vitamin B1 (Thiamine), Blood     Vitamin B6     Vitamin E, Serum        Episode of amnesia and disorientation. Unremarkable MRI brain / MRA head / MRA neck.  EEG only with nonspecific changes, advised to obtain 24 hr aEEG.  Unclear cause, has not recurred, TGA or hypertensive encephalopathy is possible.    Also having persistent headaches, dizziness.  HTN could cause these, just started tx this week.  I advised to monitor BP.    BLE numbness as well as LUE elbow pain.  Numbness/tingling for at least 1 year.  Likely has polyneuropathy.  Already started GBP.  Obtain EMG, A1c, SPEP, B12, B1, B6, E.    I do not have enough evidence (meaning, no clear diagnosis of seizures) to warrant anti-seizure medication or activity/driving restriction.    Return in about 3 months (around 9/2/2025).       ~~~~~~~~~~~~~~~~~~~~~~~~~~~    CHIEF COMPLAINT / REASON FOR VISIT:    Chief Complaint   Patient presents with    Neurologic Problem     No further brain fog episodes. Still having dizziness and headaches. Did complete testing, here to discuss results and next steps.      HISTORY OBTAINED FROM:  Patient and others as above  Chart review    HISTORY:  Cesar Wray is a 69 year old male with prostate cancer, breast cancer, multiple skin cancers, inflammatory arthritis, PRICILA on CPAP (well managed), he was at work 5/8/25 (at his desk).  He lost about 1.5 hours of time, was still at his desk, had forgotten what emails he had sent, doesn't recall a phone conversation, couldn't remember kids' birthdays,  wasn't oriented to time, couldn't remember passwords.  Was able to drive home and knew where his house was, he told his wife what happened.  He ate and went to bed, the next morning he still couldn't figure out how to get on his computer (didn't remember password).  He went to his office and tried to work, but really wasn't able to.  He drove home, and then wife drove him to  in [place].  Head CT and labs unremarkable.  The orientation and memory recovered by the evening.      Has a persistent headache since then.  BP has been elevated in the past few weeks (was 160's in , was 140's at home).  Is able to work this week, maybe a little slower but able to complete his tasks (invoices, calls, projects etc) though he is double checking himself frequently.    Has had new numbness/tingling below knees bilaterally in the past week.  No vision or hearing changes.  No tinnitus or pulsatile symptoms.  + dizziness in past week; no balance or gait issues.    No tremors.  No other events of confusion, disorientation, memory loss, jerking, shaking, or twitching.  No neck or back pain.    Epilepsy risk factors:  Normal birth and development   No febrile or childhood seizures   No meningitis/encephalitis  No head trauma with prolonged LOC/amnesia, though Feb 2025 may have a concussion after a fall  No known structural brain lesions  No FH of seizures    INTERIM HISTORY:  Still having headache (left side + occipital), assoc w dizziness (LH) upon standing (relieved with sitting).  Just started losartan, also started meloxicam + GBP trial for knees/ankles/wrists/left elbow pain.    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  Driving status:  Driving    DATA REVIEWED:  As documented in the history    May 2025  MRI brain, MRA h/n unremarkable  EEG left > right temporal slow activity  Sleep pulm note - PRICILA is well managed w CPAP  Head CT unremarkable (I independently analyzed and interpreted the above, and I agree with the reading physician  report(s).)  ESR, CRP, CBC, CMP unremarkable    March 2025  Rheum note     Aug 2024  TSH unremarkable     PHYSICAL EXAMINATION:  /72   Pulse 78   Resp 16     Gen: in NAD  MSE: nl attn/conc, nl language, nl fund of knowledge  CN: EOMI, VFF, nl facial mvmt, nl palate, nl tongue  Motor: 5/5 x4, no drift  Sensory: decrease vibration bilat ankles, absent at left great toe and decr at right great toe  Coord: nl FTN b/I  Reflex: 1+ BUE and BLE  Gait: normal    Allergies   Allergen Reactions    Levaquin [Levofloxacin] SWELLING     Other reaction(s): Other  Insomnia; swelling      Hibiclens RASH       Current medications:  Current Outpatient Medications on File Prior to Visit   Medication Sig Dispense Refill    atorvastatin 80 MG Oral Tab Take 1 tablet (80 mg total) by mouth daily. 90 tablet 3    Zolpidem Tartrate ER 12.5 MG Oral Tab CR Take 1 tablet (12.5 mg total) by mouth at bedtime. TAKE ONE TABLET BY MOUTH NIGHTLY AS NEEDED FOR SLEEP 90 tablet 1    gabapentin 100 MG Oral Cap Take 1 capsule (100 mg total) by mouth 3 (three) times daily. 90 capsule 0    losartan 25 MG Oral Tab Take 1 tablet (25 mg total) by mouth daily. 90 tablet 0    Meloxicam 7.5 MG Oral Tab Take 1 tablet (7.5 mg total) by mouth daily. 30 tablet 0    colchicine 0.6 MG Oral Tab Take 1 tablet (0.6 mg total) by mouth daily. 90 tablet 0    Calcipotriene (CALCITRENE) 0.005 % External Ointment Apply to arms and legs each evening. (Patient taking differently: as needed. Apply to arms and legs each evening.) 60 g 2    clobetasol 0.05 % External Ointment Apply to arms and legs each morning (Patient taking differently: Apply to arms and legs each morning as needed) 30 g 2    ascorbic acid 100 MG Oral Tab Vitamin C 100 mg tablet, [RxNorm: 660279] (Patient taking differently: Couple times per week)      cyanocobalamin 1000 MCG Oral Tab cyanocobalamin (vit B-12) 1,000 mcg tablet, [RxNorm: 274020] (Patient taking differently: Taking 1-2x per week)       SILDENAFIL CITRATE 100 MG Oral Tab TAKE 1 TABLET BY MOUTH EVERY DAY AS NEEDED 30 tablet 1    azelastine 0.1 % Nasal Solution INHALE 2 SPRAYS NASALLY 2 TIMES DAILY (Patient taking differently: 4-5x per week)      pantoprazole 40 MG Oral Tab EC Take 1 tablet (40 mg total) by mouth 2 (two) times daily. (Patient taking differently: Take 1 tablet (40 mg total) by mouth every morning before breakfast.)      Multiple Vitamin (ONE-DAILY MULTI VITAMINS) Oral Tab Take 1 tablet by mouth daily.      Cholecalciferol (VITAMIN D) 50 MCG (2000 UT) Oral Cap Take by mouth. (Patient taking differently: Take by mouth. Couples time per week)       No current facility-administered medications on file prior to visit.        Past Medical History:    Actinic keratosis    left wrist    Actinic keratosis    right forearm    Actinic keratosis    left neck    Actinic keratosis    left forearm    ALCOHOL USE    Allergic rhinitis    Anesthesia complication    HARD TO AWAKEN AFTER PROSTECTOMY    Arthritis    Atelectasis of both lungs    Basal cell carcinoma    left upper arm    Basal cell carcinoma    right shoulder; left forearm    Basal cell carcinoma    right forehead    Breast cancer (HCC)    right breast     Breast cancer in male (HCC)    Right    Calculus of kidney    left lower lobe     Cancer (HCC)    Breast     Carotid stenosis    Chronic cough    COPD (chronic obstructive pulmonary disease) (HCC)    Disorder of prostate    cancer    Esophageal reflux    Family history of malignant neoplasm of breast    Frequent UTI    prostate cancer    High blood pressure    High cholesterol    Hyperlipidemia    Incontinence    bladder     Indigestion    medications    Leaking of urine    prostate cancer    Leg swelling    Lymphedema of arm    right; wears sleeve; NO BP/IV to right arm     Malignant melanoma (HCC)    left calf    FRIEDMAN (nonalcoholic steatohepatitis)    Dr Louis ; from tamoxifen - stable    Osteoarthritis    Personal history of  arthritis    Prostate cancer (HCC)    [facility]    Sleep apnea    CPAP    Squamous cell carcinoma    in-situ, mid upper forehead    Squamous cell carcinoma    in-situ, mid upper chest    Squamous cell carcinoma    in-situ, left upper arm; in-situ, left forearm    Squamous cell carcinoma    in-situ, right posterior neck; in-situ, right upper arm    Visual impairment    glasses       Past Surgical History:   Procedure Laterality Date    Breast surgery  12-    breast removed    Colonoscopy N/A 12/05/2016    Procedure: COLONOSCOPY;  Surgeon: Cole Storey MD;  Location:  ENDOSCOPY    Colonoscopy  12/15/2018    Colonoscopy & polypectomy  12/05/2016    small polyp at prior polypectomy site     Foot/toes surgery proc unlisted  12/2012    Tarsil fusion, 8 screws and 21 plate    Hand/finger surgery unlisted      surgery of the thumb    Laparoscopy, surgical prostatectomy, retropubic radical, w/nerve sparing  01/2015    Leg/ankle surgery proc unlisted  1978 1989    first left then right    Theodore localization wire 1 site right (cpt=19281)      Mastectomy right  12/15/2015    SENTINEL AND AXILLARY LYMPH NODE REMOVAL, Do not use right arm for IV/BP    Needle biopsy right      Other surgical history  01/2015    radical prostatectomy    Other surgical history Left 09/2022    melanoma removal calf    Other surgical history Left 1/2015    melanoma removal calf    Prostate surgery  2015    Repair of biceps tendon at elbow  6/20012    Dr Reyes at [facility]    Repair rotator cuff,chronic      Shoulder surg proc unlisted Bilateral 2004/2012    right shoulder/left    Skin surgery Right 06/2022    shoulder skin cancer removal    Tonsillectomy      Vasectomy         Social History     Socioeconomic History    Marital status:     Number of children: 3   Occupational History    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, [employer] Methyl ethyl ketone acetone exposure    Tobacco Use    Smoking status: Never    Smokeless tobacco: Never   Vaping Use    Vaping status: Never Used   Substance and Sexual Activity    Alcohol use: Yes     Alcohol/week: 5.0 standard drinks of alcohol     Types: 1 Glasses of wine, 2 Cans of beer, 2 Standard drinks or equivalent per week     Comment: Social    Drug use: No   Other Topics Concern    Caffeine Concern No     Comment: 1 cup of coffee in AM    Sleep Concern No    Stress Concern Yes    Weight Concern No    Special Diet No    Exercise No    Seat Belt Yes       Family History   Problem Relation Age of Onset    Breast Cancer Sister 50        uterine ca after tamoxifen tx (dx 51)    Cancer Sister 51        uterine ca (due to tamoxifen)    Other (Benign breast biopsy) Sister 50    Gastro-Intestinal Disorder Father         Ulcers    Diabetes Father     Neurological Disorder Father         Parkinson's Disease    Other (Other) Father     Heart Disease Mother     Breast Cancer Mother 75        d.82    Cancer Mother     Obesity Mother     Other (Benign breast biopsy) Sister 45    Other (Benign breast biopsy) Sister     Breast Cancer Paternal Aunt         d.92    Cancer Maternal Uncle         brain ca; d.40's    Other (Gout) Brother     Cancer Self 59        prostate    Breast Cancer Self 60        invasive ductal ca    Prostate Cancer Self     Colon Polyps Self     Cancer Sister     Breast Cancer Sister     Uterine Cancer Sister     Cancer Maternal Uncle        Huber Chan MD, FAES, FAAN  Board-Certified in Neurology, Epilepsy, and Clinical Neurophysiology  [facility]

## 2025-06-03 NOTE — TELEPHONE ENCOUNTER
Submitted prior authorization for EEG on [employer]     CPT codes 64295, 13634    No Auth Required.

## 2025-06-10 ENCOUNTER — LAB ENCOUNTER (OUTPATIENT)
Dept: LAB | Age: 70
End: 2025-06-10
Attending: Other
Payer: COMMERCIAL

## 2025-06-10 DIAGNOSIS — G62.9 POLYNEUROPATHY: ICD-10-CM

## 2025-06-10 LAB
EST. AVERAGE GLUCOSE BLD GHB EST-MCNC: 108 MG/DL (ref 68–126)
HBA1C MFR BLD: 5.4 % (ref ?–5.7)
VIT B12 SERPL-MCNC: 734 PG/ML (ref 211–911)

## 2025-06-10 PROCEDURE — 83036 HEMOGLOBIN GLYCOSYLATED A1C: CPT | Performed by: OTHER

## 2025-06-10 PROCEDURE — 84165 PROTEIN E-PHORESIS SERUM: CPT | Performed by: OTHER

## 2025-06-10 PROCEDURE — 82607 VITAMIN B-12: CPT | Performed by: OTHER

## 2025-06-10 PROCEDURE — 83521 IG LIGHT CHAINS FREE EACH: CPT | Performed by: OTHER

## 2025-06-10 PROCEDURE — 86334 IMMUNOFIX E-PHORESIS SERUM: CPT | Performed by: OTHER

## 2025-06-10 PROCEDURE — 84425 ASSAY OF VITAMIN B-1: CPT | Performed by: OTHER

## 2025-06-10 PROCEDURE — 84207 ASSAY OF VITAMIN B-6: CPT | Performed by: OTHER

## 2025-06-10 PROCEDURE — 84446 ASSAY OF VITAMIN E: CPT | Performed by: OTHER

## 2025-06-12 ENCOUNTER — HOSPITAL ENCOUNTER (OUTPATIENT)
Dept: CV DIAGNOSTICS | Facility: HOSPITAL | Age: 70
Discharge: HOME | End: 2025-06-12
Attending: FAMILY MEDICINE
Payer: COMMERCIAL

## 2025-06-12 DIAGNOSIS — R07.9 LEFT-SIDED CHEST PAIN: ICD-10-CM

## 2025-06-12 LAB
ALBUMIN SERPL ELPH-MCNC: 4.32 G/DL (ref 3.75–5.21)
ALBUMIN/GLOB SERPL: 1.99 {RATIO} (ref 1–2)
ALPHA1 GLOB SERPL ELPH-MCNC: 0.21 G/DL (ref 0.19–0.46)
ALPHA2 GLOB SERPL ELPH-MCNC: 0.61 G/DL (ref 0.48–1.05)
B-GLOBULIN SERPL ELPH-MCNC: 0.64 G/DL (ref 0.68–1.23)
GAMMA GLOB SERPL ELPH-MCNC: 0.71 G/DL (ref 0.62–1.7)
KAPPA LC FREE SER-MCNC: 1.52 MG/DL (ref 0.33–1.94)
KAPPA LC FREE/LAMBDA FREE SER NEPH: 1.26 {RATIO} (ref 0.26–1.65)
LAMBDA LC FREE SERPL-MCNC: 1.21 MG/DL (ref 0.57–2.63)
PROT SERPL-MCNC: 6.5 G/DL (ref 5.7–8.2)

## 2025-06-12 PROCEDURE — 93017 CV STRESS TEST TRACING ONLY: CPT | Performed by: FAMILY MEDICINE

## 2025-06-12 PROCEDURE — 93018 CV STRESS TEST I&R ONLY: CPT | Performed by: FAMILY MEDICINE

## 2025-06-14 LAB — VITAMIN B1 WHOLE BLD: 120.8 NMOL/L

## 2025-06-15 LAB
VIT E ALPHA TOCO: 12.3 MG/L
VIT E GAMMA TOCO: 0.9 MG/L

## 2025-06-17 LAB — VITAMIN B6: 38.2 UG/L

## 2025-06-17 RX ORDER — MELOXICAM 7.5 MG/1
7.5 TABLET ORAL DAILY
Qty: 30 TABLET | Refills: 0 | Status: SHIPPED | OUTPATIENT
Start: 2025-06-17

## 2025-06-19 ENCOUNTER — OFFICE VISIT (OUTPATIENT)
Dept: PODIATRY CLINIC | Facility: CLINIC | Age: 70
End: 2025-06-19

## 2025-06-19 DIAGNOSIS — L84 CALLUS OF FOOT: Primary | ICD-10-CM

## 2025-06-19 DIAGNOSIS — M77.41 METATARSALGIA OF RIGHT FOOT: ICD-10-CM

## 2025-06-19 DIAGNOSIS — M21.6X1 PLANTAR FAT PAD ATROPHY OF RIGHT FOOT: ICD-10-CM

## 2025-06-19 PROCEDURE — 99213 OFFICE O/P EST LOW 20 MIN: CPT | Performed by: PODIATRIST

## 2025-06-19 NOTE — PROGRESS NOTES
Cesar Wray is a 69 year old male.   Chief Complaint   Patient presents with    Follow - Up     Right foot- rates pain 2/10-          HPI:   Angel Luis returns to the clinic again complaining of a painful callus on the outside bottom of his right foot.  Pain worse with prolonged standing and walking.  At today's visit reviewed nurse's history as taken above, allergies medications and medical history as documented below.  All changes duly noted  Allergies: Levaquin [levofloxacin] and Hibiclens   Current Medications[1]   Past Medical History[2]   Past Surgical History[3]   Family History[4]   Social Hx on file[5]        REVIEW OF SYSTEMS:   Today reviewed systens as documented below  GENERAL HEALTH: feels well otherwise  SKIN: Refer to exam below  RESPIRATORY: denies shortness of breath with exertion  CARDIOVASCULAR: denies chest pain on exertion  GI: denies abdominal pain and denies heartburn  NEURO: denies headaches    EXAM:   There were no vitals taken for this visit.  GENERAL: well developed, well nourished, in no apparent distress  EXTREMITIES:   1. Integument: The skin on his right foot is warm and dry he has a well-formed hyperkeratosis directly underneath the fifth metatarsal head on his right foot with some atrophy of the plantar fat pad.   2. Vascular: Again there are no changes here the patient has palpable dorsalis pedis posterior tibial pulses bilateral   3. Neurologic: Patient has intact sensorium no deficits are noted   4. Musculoskeletal: Patient has good muscle strength he is ambulatory he has plantar fat pad atrophy he does have fusions in his rear foot this is maybe holding the foot in a position that makes the fifth metatarsal head prominent.    ASSESSMENT AND PLAN:   Diagnoses and all orders for this visit:    Callus of foot    Metatarsalgia of right foot    Plantar fat pad atrophy of right foot        Plan: At today's office visit using a sterile 15 blade trimmed down and debrided the area.   Padding does not seem to help when he said last time it made it worse therefore recommended he just keep it filed down with a pumice stone on a daily basis and apply moisturizing cream.  Recommended follow-up as needed.    The patient indicates understanding of these issues and agrees to the plan.    Jeff Cardenas DPM       [1]   Current Outpatient Medications   Medication Sig Dispense Refill    Meloxicam 7.5 MG Oral Tab Take 1 tablet (7.5 mg total) by mouth daily. 30 tablet 0    atorvastatin 80 MG Oral Tab Take 1 tablet (80 mg total) by mouth daily. 90 tablet 3    Zolpidem Tartrate ER 12.5 MG Oral Tab CR Take 1 tablet (12.5 mg total) by mouth at bedtime. TAKE ONE TABLET BY MOUTH NIGHTLY AS NEEDED FOR SLEEP 90 tablet 1    gabapentin 100 MG Oral Cap Take 1 capsule (100 mg total) by mouth 3 (three) times daily. 90 capsule 0    losartan 25 MG Oral Tab Take 1 tablet (25 mg total) by mouth daily. 90 tablet 0    colchicine 0.6 MG Oral Tab Take 1 tablet (0.6 mg total) by mouth daily. 90 tablet 0    Calcipotriene (CALCITRENE) 0.005 % External Ointment Apply to arms and legs each evening. (Patient taking differently: as needed. Apply to arms and legs each evening.) 60 g 2    clobetasol 0.05 % External Ointment Apply to arms and legs each morning (Patient taking differently: Apply to arms and legs each morning as needed) 30 g 2    ascorbic acid 100 MG Oral Tab Vitamin C 100 mg tablet, [RxNorm: 724396] (Patient taking differently: Couple times per week)      cyanocobalamin 1000 MCG Oral Tab cyanocobalamin (vit B-12) 1,000 mcg tablet, [RxNorm: 691271] (Patient taking differently: Taking 1-2x per week)      SILDENAFIL CITRATE 100 MG Oral Tab TAKE 1 TABLET BY MOUTH EVERY DAY AS NEEDED 30 tablet 1    azelastine 0.1 % Nasal Solution INHALE 2 SPRAYS NASALLY 2 TIMES DAILY (Patient taking differently: 4-5x per week)      pantoprazole 40 MG Oral Tab EC Take 1 tablet (40 mg total) by mouth 2 (two) times daily. (Patient taking  differently: Take 1 tablet (40 mg total) by mouth every morning before breakfast.)      Multiple Vitamin (ONE-DAILY MULTI VITAMINS) Oral Tab Take 1 tablet by mouth daily.      Cholecalciferol (VITAMIN D) 50 MCG (2000 UT) Oral Cap Take by mouth. (Patient taking differently: Take by mouth. Couples time per week)     [2]   Past Medical History:   Actinic keratosis    left wrist    Actinic keratosis    right forearm    Actinic keratosis    left neck    Actinic keratosis    left forearm    ALCOHOL USE    Allergic rhinitis    Anesthesia complication    HARD TO AWAKEN AFTER PROSTECTOMY    Arthritis    Atelectasis of both lungs    Basal cell carcinoma    left upper arm    Basal cell carcinoma    right shoulder; left forearm    Basal cell carcinoma    right forehead    Breast cancer (HCC)    right breast     Breast cancer in male (HCC)    Right    Calculus of kidney    left lower lobe     Cancer (HCC)    Breast     Carotid stenosis    Chronic cough    COPD (chronic obstructive pulmonary disease) (HCC)    Disorder of prostate    cancer    Esophageal reflux    Family history of malignant neoplasm of breast    Frequent UTI    prostate cancer    High blood pressure    High cholesterol    Hyperlipidemia    Incontinence    bladder     Indigestion    medications    Leaking of urine    prostate cancer    Leg swelling    Lymphedema of arm    right; wears sleeve; NO BP/IV to right arm     Malignant melanoma (HCC)    left calf    FRIEDMAN (nonalcoholic steatohepatitis)    Dr Louis ; from tamoxifen - stable    Osteoarthritis    Personal history of arthritis    Prostate cancer (HCC)    [facility]    Sleep apnea    CPAP    Squamous cell carcinoma    in-situ, mid upper forehead    Squamous cell carcinoma    in-situ, mid upper chest    Squamous cell carcinoma    in-situ, left upper arm; in-situ, left forearm    Squamous cell carcinoma    in-situ, right posterior neck; in-situ, right upper arm    Visual impairment    glasses    [3]   Past Surgical History:  Procedure Laterality Date    Breast surgery  12-    breast removed    Colonoscopy N/A 12/05/2016    Procedure: COLONOSCOPY;  Surgeon: Cole Storey MD;  Location:  ENDOSCOPY    Colonoscopy  12/15/2018    Colonoscopy & polypectomy  12/05/2016    small polyp at prior polypectomy site     Foot/toes surgery proc unlisted  12/2012    Tarsil fusion, 8 screws and 21 plate    Hand/finger surgery unlisted      surgery of the thumb    Laparoscopy, surgical prostatectomy, retropubic radical, w/nerve sparing  01/2015    Leg/ankle surgery proc unlisted  1978 1989    first left then right    Theodore localization wire 1 site right (cpt=19281)      Mastectomy right  12/15/2015    SENTINEL AND AXILLARY LYMPH NODE REMOVAL, Do not use right arm for IV/BP    Needle biopsy right      Other surgical history  01/2015    radical prostatectomy    Other surgical history Left 09/2022    melanoma removal calf    Other surgical history Left 1/2015    melanoma removal calf    Prostate surgery  2015    Repair of biceps tendon at elbow  6/20012    Dr Reyes at [facility]    Repair rotator cuff,chronic      Shoulder surg proc unlisted Bilateral 2004/2012    right shoulder/left    Skin surgery Right 06/2022    shoulder skin cancer removal    Tonsillectomy      Vasectomy     [4]   Family History  Problem Relation Age of Onset    Breast Cancer Sister 50        uterine ca after tamoxifen tx (dx 51)    Cancer Sister 51        uterine ca (due to tamoxifen)    Other (Benign breast biopsy) Sister 50    Gastro-Intestinal Disorder Father         Ulcers    Diabetes Father     Neurological Disorder Father         Parkinson's Disease    Other (Other) Father     Heart Disease Mother     Breast Cancer Mother 75        d.82    Cancer Mother     Obesity Mother     Other (Benign breast biopsy) Sister 45    Other (Benign breast biopsy) Sister     Breast Cancer Paternal Aunt         d.92    Cancer Maternal Uncle         brain ca;  d.40's    Other (Gout) Brother     Cancer Self 59        prostate    Breast Cancer Self 60        invasive ductal ca    Prostate Cancer Self     Colon Polyps Self     Cancer Sister     Breast Cancer Sister     Uterine Cancer Sister     Cancer Maternal Uncle    [5]   Social History  Socioeconomic History    Marital status:     Number of children: 3   Occupational History    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 [employer], [employer] Methyl ethyl ketone acetone exposure   Tobacco Use    Smoking status: Never    Smokeless tobacco: Never   Vaping Use    Vaping status: Never Used   Substance and Sexual Activity    Alcohol use: Yes     Alcohol/week: 5.0 standard drinks of alcohol     Types: 1 Glasses of wine, 2 Cans of beer, 2 Standard drinks or equivalent per week     Comment: Social    Drug use: No   Other Topics Concern    Caffeine Concern No     Comment: 1 cup of coffee in AM    Sleep Concern No    Stress Concern Yes    Weight Concern No    Special Diet No    Exercise No    Seat Belt Yes

## 2025-06-30 ENCOUNTER — NURSE ONLY (OUTPATIENT)
Dept: ELECTROPHYSIOLOGY | Facility: HOSPITAL | Age: 70
End: 2025-06-30
Attending: Other
Payer: COMMERCIAL

## 2025-06-30 DIAGNOSIS — R41.3 AMNESIA: ICD-10-CM

## 2025-06-30 PROCEDURE — 95708 EEG WO VID EA 12-26HR UNMNTR: CPT

## 2025-06-30 PROCEDURE — 95700 EEG CONT REC W/VID EEG TECH: CPT

## 2025-07-01 ENCOUNTER — OFFICE VISIT (OUTPATIENT)
Dept: FAMILY MEDICINE CLINIC | Facility: CLINIC | Age: 70
End: 2025-07-01
Payer: COMMERCIAL

## 2025-07-01 ENCOUNTER — NURSE ONLY (OUTPATIENT)
Dept: ELECTROPHYSIOLOGY | Facility: HOSPITAL | Age: 70
End: 2025-07-01
Attending: Other
Payer: COMMERCIAL

## 2025-07-01 VITALS
OXYGEN SATURATION: 95 % | BODY MASS INDEX: 33.03 KG/M2 | DIASTOLIC BLOOD PRESSURE: 80 MMHG | HEART RATE: 73 BPM | HEIGHT: 69 IN | RESPIRATION RATE: 18 BRPM | SYSTOLIC BLOOD PRESSURE: 138 MMHG | WEIGHT: 223 LBS

## 2025-07-01 DIAGNOSIS — J01.00 ACUTE NON-RECURRENT MAXILLARY SINUSITIS: Primary | ICD-10-CM

## 2025-07-01 DIAGNOSIS — I10 PRIMARY HYPERTENSION: ICD-10-CM

## 2025-07-01 PROCEDURE — 3008F BODY MASS INDEX DOCD: CPT | Performed by: NURSE PRACTITIONER

## 2025-07-01 PROCEDURE — 3079F DIAST BP 80-89 MM HG: CPT | Performed by: NURSE PRACTITIONER

## 2025-07-01 PROCEDURE — 3075F SYST BP GE 130 - 139MM HG: CPT | Performed by: NURSE PRACTITIONER

## 2025-07-01 PROCEDURE — 99214 OFFICE O/P EST MOD 30 MIN: CPT | Performed by: NURSE PRACTITIONER

## 2025-07-01 RX ORDER — LOSARTAN POTASSIUM 50 MG/1
50 TABLET ORAL DAILY
Qty: 90 TABLET | Refills: 0 | Status: SHIPPED | OUTPATIENT
Start: 2025-07-01

## 2025-07-01 RX ORDER — GABAPENTIN 100 MG/1
100 CAPSULE ORAL 2 TIMES DAILY
COMMUNITY
Start: 2025-07-01

## 2025-07-01 NOTE — PROGRESS NOTES
Chief Complaint   Patient presents with    Sore Throat     Per pt has had a sore throat and congestion for the past 3 weeks, has some bleeding in the back of throat      HPI:  Presents with approx 3 week history of right sided sore throat, sonus congestion, right ear pain, occasional cough. Denies fever/chills, cough, left ear pain/pressure, SOB/GONCALVES, body aches or fatigue. Has been treating with nasal spray and saline rinses    HTN- taking losartan as ordered. Reports home BP readings 130's/90's. Denies chest pain, palpitations, SOB, GONCALVES, headaches, dizziness, lightheadedness, visual changes.     Past Medical History[1]    Problem List[2]    Current Medications[3]    Physical Exam  /80   Pulse 73   Resp 18   Ht 5' 9\" (1.753 m)   Wt 223 lb (101.2 kg)   SpO2 95%   BMI 32.93 kg/m²   Constitutional: well developed, well nourished, in no apparent distress  HEENT: Normocephalic and atraumatic. Right tympanic membrane opaque with bulging and significant fluid, no erythema Left TM clear with bulging, no fluid. Oropharynx is erythematous without exudate, lesions or edema. (+)PND. No facial tenderness.  Eyes: Conjunctivae are pink and moist without drainage.   Lymphadenopathy: No cervical adenopathy.   Cardiovascular: Normal rate, regular rhythm.  No murmur.   Pulmonary/Chest: No respiratory distress. Effort normal. Breath sounds clear bilaterally. No wheezes, rhonchi or rales appreciated. No cough heard during exam.   Skin: Skin is warm and dry. No pallor. No rash noted.    A/P:    Encounter Diagnoses   Name Primary?    Acute non-recurrent maxillary sinusitis- Augmentin as ordered. Medication administration, use and side effects discussed. Instructed to notify office if not improved in 4-5 days or if symptoms worsen. Verbalized understanding of instructions and agreeable to this plan of care.     Yes    Primary hypertension- increase losartan to 50mg daily. Notify office with home BP readings in 2-3 weeks.  Verbalized understanding of instructions and agreeable to this plan of care.           No orders of the defined types were placed in this encounter.      Meds & Refills for this Visit:  Requested Prescriptions     Signed Prescriptions Disp Refills    amoxicillin clavulanate 875-125 MG Oral Tab 20 tablet 0     Sig: Take 1 tablet by mouth 2 (two) times daily for 10 days.    losartan 50 MG Oral Tab 90 tablet 0     Sig: Take 1 tablet (50 mg total) by mouth daily.       Imaging & Consults:  None    No follow-ups on file.  There are no Patient Instructions on file for this visit.    All questions were answered and the patient understands the plan.            [1]   Past Medical History:   Actinic keratosis    left wrist    Actinic keratosis    right forearm    Actinic keratosis    left neck    Actinic keratosis    left forearm    ALCOHOL USE    Allergic rhinitis    Anesthesia complication    HARD TO AWAKEN AFTER PROSTECTOMY    Arthritis    Atelectasis of both lungs    Basal cell carcinoma    left upper arm    Basal cell carcinoma    right shoulder; left forearm    Basal cell carcinoma    right forehead    Breast cancer (HCC)    right breast     Breast cancer in male (HCC)    Right    Calculus of kidney    left lower lobe     Cancer (HCC)    Breast     Carotid stenosis    Chronic cough    COPD (chronic obstructive pulmonary disease) (HCC)    Disorder of prostate    cancer    Esophageal reflux    Family history of malignant neoplasm of breast    Frequent UTI    prostate cancer    High blood pressure    High cholesterol    Hyperlipidemia    Incontinence    bladder     Indigestion    medications    Leaking of urine    prostate cancer    Leg swelling    Lymphedema of arm    right; wears sleeve; NO BP/IV to right arm     Malignant melanoma (HCC)    left calf    FRIEDMAN (nonalcoholic steatohepatitis)    Dr Louis ; from tamoxifen - stable    Osteoarthritis    Personal history of arthritis    Prostate cancer ([facility])    [facility]    Sleep apnea    CPAP    Squamous cell carcinoma    in-situ, mid upper forehead    Squamous cell carcinoma    in-situ, mid upper chest    Squamous cell carcinoma    in-situ, left upper arm; in-situ, left forearm    Squamous cell carcinoma    in-situ, right posterior neck; in-situ, right upper arm    Visual impairment    glasses   [2]   Patient Active Problem List  Diagnosis    History of prostate cancer    Pain in joint, ankle and foot    Mixed hyperlipidemia    Vitamin D deficiency    Insomnia    Hemangioma    History of breast cancer in male    PRICILA (obstructive sleep apnea)    Decreased platelet count    ED (erectile dysfunction) of organic origin    PEDRO (stress urinary incontinence), male    Psoriasis    NAFLD (nonalcoholic fatty liver disease)    History of malignant melanoma of skin    Chronic obstructive pulmonary disease, unspecified (HCC)    SVT (supraventricular tachycardia) (HCC)    Lymphedema    Primary hypertension    Inflammatory polyarthritis (HCC)    Selective IgM deficiency (HCC)    Breast pain, left    History of right breast cancer    Amnesia   [3]   Current Outpatient Medications   Medication Sig Dispense Refill    amoxicillin clavulanate 875-125 MG Oral Tab Take 1 tablet by mouth 2 (two) times daily for 10 days. 20 tablet 0    losartan 50 MG Oral Tab Take 1 tablet (50 mg total) by mouth daily. 90 tablet 0    gabapentin 100 MG Oral Cap Take 1 capsule (100 mg total) by mouth 2 (two) times daily.      Meloxicam 7.5 MG Oral Tab Take 1 tablet (7.5 mg total) by mouth daily. 30 tablet 0    atorvastatin 80 MG Oral Tab Take 1 tablet (80 mg total) by mouth daily. 90 tablet 3    Zolpidem Tartrate ER 12.5 MG Oral Tab CR Take 1 tablet (12.5 mg total) by mouth at bedtime. TAKE ONE TABLET BY MOUTH NIGHTLY AS NEEDED FOR SLEEP 90 tablet 1    colchicine 0.6 MG Oral Tab Take 1 tablet (0.6 mg total) by mouth daily. 90 tablet 0    Calcipotriene (CALCITRENE) 0.005 % External Ointment Apply to arms and legs  each evening. (Patient taking differently: as needed. Apply to arms and legs each evening.) 60 g 2    clobetasol 0.05 % External Ointment Apply to arms and legs each morning (Patient taking differently: Apply to arms and legs each morning as needed) 30 g 2    ascorbic acid 100 MG Oral Tab Vitamin C 100 mg tablet, [RxNorm: 410939] (Patient taking differently: Couple times per week)      cyanocobalamin 1000 MCG Oral Tab cyanocobalamin (vit B-12) 1,000 mcg tablet, [RxNorm: 765768] (Patient taking differently: Taking 1-2x per week)      SILDENAFIL CITRATE 100 MG Oral Tab TAKE 1 TABLET BY MOUTH EVERY DAY AS NEEDED 30 tablet 1    azelastine 0.1 % Nasal Solution INHALE 2 SPRAYS NASALLY 2 TIMES DAILY (Patient taking differently: 4-5x per week)      pantoprazole 40 MG Oral Tab EC Take 1 tablet (40 mg total) by mouth 2 (two) times daily. (Patient taking differently: Take 1 tablet (40 mg total) by mouth every morning before breakfast.)      Multiple Vitamin (ONE-DAILY MULTI VITAMINS) Oral Tab Take 1 tablet by mouth daily.      Cholecalciferol (VITAMIN D) 50 MCG (2000 UT) Oral Cap Take by mouth. (Patient taking differently: Take by mouth. Couples time per week)

## 2025-07-07 NOTE — PROCEDURES
[facility]    AMBULATORY ELECTROENCEPHALOGRAM (EEG) REPORT  Patient Name:  Cesar Wray   MRN / CSN:  [id number] / [id number]   Date of Birth / Age:  10/5/1955 /  69 year old   Encounter (Start) Date:  6/30/25    Study Duration: 23 hrs     HISTORY  This is a 69 year old male, EEG has been ordered for Amnesia    METHODS:  Twenty-two electrodes were applied according to the 10-20 electrode placement system on this audio-video EEG. EKG monitoring, monopolar and bipolar montages are routinely utilized. Video-EEG data were recorded continuously (or only for the initial portion, in the case of ambulatory studies) and stored digitally.    FINDINGS:  1) Background: A 10 Hz posterior dominant rhythm (PDR) was seen, symmetric and synchronous, reactive to eye opening.  2) Sleep: Stage N1 / drowsiness and Stage N2 were recorded, with normal, symmetric, and synchronous architecture.  3) Activation Procedures:                    Hyperventilation was not performed.                    Photic stimulation was not performed.  4) Abnormal Slowing:  None  5) Epileptiform Activity:  None  6) Seizures:  None  No events captured - push buttons were in error.    FINAL INTERPRETATION:    This EEG is normal in waking, drowsiness, and sleep.    Huber Chan MD, FAES, FAAN  Board-Certified in Neurology, Epilepsy, and Clinical Neurophysiology  [facility]

## 2025-07-11 ENCOUNTER — LAB ENCOUNTER (OUTPATIENT)
Dept: LAB | Age: 70
End: 2025-07-11
Attending: NURSE PRACTITIONER
Payer: COMMERCIAL

## 2025-07-11 DIAGNOSIS — I10 HTN (HYPERTENSION): Primary | ICD-10-CM

## 2025-07-11 DIAGNOSIS — I47.10 PAROXYSMAL SUPRAVENTRICULAR TACHYCARDIA (HCC): ICD-10-CM

## 2025-07-11 LAB
ANION GAP SERPL CALC-SCNC: 4 MMOL/L (ref 0–18)
BUN BLD-MCNC: 12 MG/DL (ref 9–23)
CALCIUM BLD-MCNC: 9.5 MG/DL (ref 8.7–10.6)
CHLORIDE SERPL-SCNC: 108 MMOL/L (ref 98–112)
CO2 SERPL-SCNC: 29 MMOL/L (ref 21–32)
CREAT BLD-MCNC: 1.06 MG/DL (ref 0.7–1.3)
EGFRCR SERPLBLD CKD-EPI 2021: 76 ML/MIN/1.73M2 (ref 60–?)
FASTING STATUS PATIENT QL REPORTED: YES
GLUCOSE BLD-MCNC: 103 MG/DL (ref 70–99)
OSMOLALITY SERPL CALC.SUM OF ELEC: 292 MOSM/KG (ref 275–295)
POTASSIUM SERPL-SCNC: 4.5 MMOL/L (ref 3.5–5.1)
SODIUM SERPL-SCNC: 141 MMOL/L (ref 136–145)

## 2025-07-11 PROCEDURE — 80048 BASIC METABOLIC PNL TOTAL CA: CPT

## 2025-07-11 PROCEDURE — 36415 COLL VENOUS BLD VENIPUNCTURE: CPT

## 2025-07-23 ENCOUNTER — OFFICE VISIT (OUTPATIENT)
Facility: LOCATION | Age: 70
End: 2025-07-23
Attending: INTERNAL MEDICINE
Payer: COMMERCIAL

## 2025-07-23 VITALS
WEIGHT: 218 LBS | RESPIRATION RATE: 18 BRPM | TEMPERATURE: 98 F | SYSTOLIC BLOOD PRESSURE: 147 MMHG | DIASTOLIC BLOOD PRESSURE: 87 MMHG | BODY MASS INDEX: 32 KG/M2 | HEART RATE: 56 BPM | OXYGEN SATURATION: 98 %

## 2025-07-23 DIAGNOSIS — C50.121 MALIGNANT NEOPLASM OF CENTRAL PORTION OF RIGHT BREAST IN MALE, ESTROGEN RECEPTOR POSITIVE (HCC): Primary | ICD-10-CM

## 2025-07-23 DIAGNOSIS — Z17.0 MALIGNANT NEOPLASM OF CENTRAL PORTION OF RIGHT BREAST IN MALE, ESTROGEN RECEPTOR POSITIVE (HCC): Primary | ICD-10-CM

## 2025-07-23 DIAGNOSIS — C43.72 MALIGNANT MELANOMA OF LEFT LOWER LEG (HCC): ICD-10-CM

## 2025-07-23 DIAGNOSIS — C61 PROSTATE CANCER (HCC): ICD-10-CM

## 2025-07-23 NOTE — PROGRESS NOTES
Cancer Center Progress Note    Problem List:      Problem List[1]      History of Present Illness  Mr. Gumaro Wray is a 69 year old male who presents for follow-up regarding his cancer history and recent neurological events.    In February, he experienced a fall resulting in a concussion. In May, he had an episode of transient global amnesia and has been under the care of a neurologist since. During a visit to urgent care, high blood pressure was noted, prompting further cardiology workups. An MRI of the brain in May showed trace T2 flare signal hyperintensities within the cerebral white matter, consistent with microvascular ischemic changes. An MRI of the neck on the same day showed normal flow to the brain.    He has a history of breast cancer, with a negative mammogram in December 2024. He was previously on tamoxifen but has been off it for several years. He underwent surgery for breast cancer in December 2015 and is approaching the ten-year mayda since his surgery. Genetic testing was performed in 2015.    He also has a history of prostate cancer and has been experiencing bladder leakage for which he has been wearing pads for ten years.    He reports ongoing knee, ankle, elbow, and wrist pain. He was previously on medication prescribed by a rheumatologist, which he discontinued after four to six months due to lack of efficacy. He continues to experience pain despite stopping the medication.    He has a history of melanoma on the left lower leg and basal cell carcinoma on the head, for which he underwent Mohs surgery a few months ago. He is currently following up with dermatology and plans to transition care to a new dermatologist due to his current dermatologist's shelter.      Review of Systems:   Constitutional: Negative for anorexia, fevers, chills, night sweats and weight loss.  Eyes: Negative for visual disturbance, irritation and redness.  Respiratory: Negative for cough, hemoptysis, chest pain,  or dyspnea.  Cardiovascular: Negative for angina, orthopnea or palpitations.  Gastrointestinal: Negative for nausea, vomiting, change in bowel habits, diarrhea, constipation and abdominal pain.  Integument/breast: Negative for rash, skin lesions, and pruritus.  Hematologic/lymphatic: Negative for easy bruising, bleeding, and lymphadenopathy.  Musculoskeletal: Negative for myalgias, arthralgias, muscle weakness.  Psychiatric: The patient's mood was calm and appropriate for this visit.  The pertinent positives and negatives were described. All other systems were negative.    PMH/PSH:  Past Medical History[2]    Past Surgical History[3]    Family History Reviewed:  Family History[4]    Allergies:     Allergies[5]    Medications:  Current Medications[6]       Vital Signs:      Height: --  Weight: 98.9 kg (218 lb) (07/23 1123)  BSA (Calculated - sq m): --  Pulse: 56 (07/23 1123)  BP: 147/87 (07/23 1123)  Temp: 97.5 °F (36.4 °C) (07/23 1123)  Do Not Use - Resp Rate: --  SpO2: 98 % (07/23 1123)      Performance Status:  ECOG 0: Fully active, able to carry on all pre-disease performance without restriction     Physical Examination:      Constitutional: Patient is alert and oriented x 3, not in acute distress.  Eyes: Anicteric sclera, pink conjunctiva.  HEENT:  Oropharynx is clear. Neck is supple.  Respiratory: Clear to auscultation and percussion. No rales.  No wheezes.  Cardiovascular: Regular rate and rhythm. No murmurs.  Chest: There is no skin or chest lesion on exam.  Gastrointestinal: Soft, non tender with good bowel sounds.  Musculoskeletal: No edema. No calf tenderness.  Skin: No suspicious skin lesion, no rash, no ulceration.  Lymphatics: There is no palpable lymphadenopathy throughout in the cervical, supraclavicular, or axillary regions.  Psychiatric: The patient's mood is calm and appropriate for this visit.       Results reviewed at this visit:     Lab Results   Component Value Date    WBC 4.2 05/09/2025    RBC  4.88 05/09/2025    HGB 15.8 05/09/2025    HCT 45.1 05/09/2025    MCV 92.4 05/09/2025    MCH 32.4 05/09/2025    MCHC 35.0 05/09/2025    RDW 12.7 05/09/2025    .0 (L) 05/09/2025     Lab Results   Component Value Date     07/11/2025    K 4.5 07/11/2025     07/11/2025    CO2 29.0 07/11/2025    BUN 12 07/11/2025    CREATSERUM 1.06 07/11/2025     (H) 07/11/2025    CA 9.5 07/11/2025    ALKPHO 86 05/09/2025    ALT 47 05/09/2025    AST 38 (H) 05/09/2025    BILT 2.6 (H) 05/09/2025    ALB 4.32 06/10/2025    TP 6.5 06/10/2025       Results  RADIOLOGY  Brain MRI: No evidence of neoplasm or cerebrovascular accident. Trace T2 FLAIR signal hyperintensities within the cerebral white matter, most compatible with microvascular white matter ischemic change. (05/15/2025)  Neck MRI: Unremarkable. No abnormalities in cerebral blood flow. (05/15/2025)  Mammogram: Negative. (12/2024)         Assessment & Plan  Stage IIA right breast cancer in the central breast:  Right simple mastectomy on 12/15/2015  1 node with micrometastases out of 14 nodes removed  Tumor size 2.0 cm  %; %  HER-2 negative  Ki-67 2%  Oncotype Dx RS 18    History of right breast cancer, diagnosed in December 2015. Underwent surgery and completed tamoxifen therapy in 2021. Currently, there is no evidence of recurrence. Recent mammogram in December 2024 was negative. MRI of the brain in May showed no evidence of cancer. Genetic testing in 2015 was negative but he has not had full modern panel testing. I discussed the case with our genetic counselor today (Shira Olivo). I will have him arrange follow up genetic counseling consultation.  - Continue annual follow-up visits. Next visit in one year.  - Consult genetic counselor for possible panel testing.    Prostate cancer  History of prostate cancer with ongoing bladder leakage issues. Planning to undergo male pelvic floor sling procedure to address bladder leakage. The procedure is  scheduled for August 11 at the [facility].  - Proceed with male pelvic floor sling procedure on August 11 at the [facility].    Bladder leakage  Bladder leakage ongoing for ten years, related to prostate cancer treatment. Plans to undergo male pelvic floor sling procedure to alleviate symptoms.  - Proceed with male pelvic floor sling procedure on August 11 at the [facility].    Melanoma of left lower leg  History of malignant melanoma of the left lower leg. No current issues related to melanoma.  - Continue annual follow-up visits for melanoma surveillance.    Basal cell carcinoma of the head  History of basal cell carcinoma on the head, treated with Mohs surgery. Anticipates recurrence and need for future Mohs surgeries.  - Continue dermatology follow-up with new dermatologist Memo after Dr. Arnold's [facility].    Transient global amnesia  Experienced transient global amnesia in May. Currently under the care of a neurologist. MRI of the brain in May showed no evidence of stroke or cancer.  - Continue follow-up with neurologist as needed.    Hypertension  Elevated blood pressure noted during urgent care visit. Currently undergoing cardiology workup.  - Continue cardiology workup and management of hypertension.    Microvascular ischemic changes  MRI of the brain in May showed trace T2 flare signal hyperintensities within the cerebral white matter, consistent with microvascular ischemic changes. Considered age-related changes.    Multiple joint pain  Reports knee, ankle, elbow, and wrist pain. Previously on medication prescribed by rheumatologist, but discontinued due to lack of efficacy.           The following individual(s) verbally consented to be recorded using ambient AI listening technology and understand that they can each withdraw their consent to this listening technology at any point by asking the clinician to turn off or pause the recording:    Patient name: Cesar  Willem Steele MD          [1]   Patient Active Problem List  Diagnosis    History of prostate cancer    Pain in joint, ankle and foot    Mixed hyperlipidemia    Vitamin D deficiency    Insomnia    Hemangioma    History of breast cancer in male    PIRCILA (obstructive sleep apnea)    Decreased platelet count    ED (erectile dysfunction) of organic origin    PEDRO (stress urinary incontinence), male    Psoriasis    NAFLD (nonalcoholic fatty liver disease)    History of malignant melanoma of skin    Chronic obstructive pulmonary disease, unspecified (HCC)    SVT (supraventricular tachycardia) (HCC)    Lymphedema    Primary hypertension    Inflammatory polyarthritis (HCC)    Selective IgM deficiency (HCC)    Breast pain, left    History of right breast cancer    Amnesia   [2]   Past Medical History:   Actinic keratosis    left wrist    Actinic keratosis    right forearm    Actinic keratosis    left neck    Actinic keratosis    left forearm    ALCOHOL USE    Allergic rhinitis    Anesthesia complication    HARD TO AWAKEN AFTER PROSTECTOMY    Arthritis    Atelectasis of both lungs    Basal cell carcinoma    left upper arm    Basal cell carcinoma    right shoulder; left forearm    Basal cell carcinoma    right forehead    Breast cancer (HCC)    right breast     Breast cancer in male (HCC)    Right    Calculus of kidney    left lower lobe     Cancer (HCC)    Breast     Carotid stenosis    Chronic cough    COPD (chronic obstructive pulmonary disease) (HCC)    Disorder of prostate    cancer    Esophageal reflux    Family history of malignant neoplasm of breast    Frequent UTI    prostate cancer    High blood pressure    High cholesterol    Hyperlipidemia    Incontinence    bladder     Indigestion    medications    Leaking of urine    prostate cancer    Leg swelling    Lymphedema of arm    right; wears sleeve; NO BP/IV to right arm     Malignant melanoma (HCC)    left calf    FRIEDMAN (nonalcoholic  steatohepatitis)    Dr Louis ; from tamoxifen - stable    Osteoarthritis    Personal history of arthritis    Prostate cancer (HCC)    [facility]    Sleep apnea    CPAP    Squamous cell carcinoma    in-situ, mid upper forehead    Squamous cell carcinoma    in-situ, mid upper chest    Squamous cell carcinoma    in-situ, left upper arm; in-situ, left forearm    Squamous cell carcinoma    in-situ, right posterior neck; in-situ, right upper arm    Visual impairment    glasses   [3]   Past Surgical History:  Procedure Laterality Date    Breast surgery  12-    breast removed    Colonoscopy N/A 12/05/2016    Procedure: COLONOSCOPY;  Surgeon: Cole Storey MD;  Location:  ENDOSCOPY    Colonoscopy  12/15/2018    Colonoscopy & polypectomy  12/05/2016    small polyp at prior polypectomy site     Foot/toes surgery proc unlisted  12/2012    Tarsil fusion, 8 screws and 21 plate    Hand/finger surgery unlisted      surgery of the thumb    Laparoscopy, surgical prostatectomy, retropubic radical, w/nerve sparing  01/2015    Leg/ankle surgery proc unlisted  1978 1989    first left then right    Theodore localization wire 1 site right (cpt=19281)      Mastectomy right  12/15/2015    SENTINEL AND AXILLARY LYMPH NODE REMOVAL, Do not use right arm for IV/BP    Needle biopsy right      Other surgical history  01/2015    radical prostatectomy    Other surgical history Left 09/2022    melanoma removal calf    Other surgical history Left 1/2015    melanoma removal calf    Prostate surgery  2015    Repair of biceps tendon at elbow  6/20012    Dr Reyes at [facility]    Repair rotator cuff,chronic      Shoulder surg proc unlisted Bilateral 2004/2012    right shoulder/left    Skin surgery Right 06/2022    shoulder skin cancer removal    Tonsillectomy      Vasectomy     [4]   Family History  Problem Relation Age of Onset    Breast Cancer Sister 50        uterine ca after tamoxifen tx (dx 51)    Cancer Sister 51        uterine ca (due  to tamoxifen)    Other (Benign breast biopsy) Sister 50    Gastro-Intestinal Disorder Father         Ulcers    Diabetes Father     Neurological Disorder Father         Parkinson's Disease    Other (Other) Father     Heart Disease Mother     Breast Cancer Mother 75        d.82    Cancer Mother     Obesity Mother     Other (Benign breast biopsy) Sister 45    Other (Benign breast biopsy) Sister     Breast Cancer Paternal Aunt         d.92    Cancer Maternal Uncle         brain ca; d.40's    Other (Gout) Brother     Cancer Self 59        prostate    Breast Cancer Self 60        invasive ductal ca    Prostate Cancer Self     Colon Polyps Self     Cancer Sister     Breast Cancer Sister     Uterine Cancer Sister     Cancer Maternal Uncle    [5]   Allergies  Allergen Reactions    Levaquin [Levofloxacin] SWELLING     Other reaction(s): Other  Insomnia; swelling      Hibiclens RASH   [6]    losartan 50 MG Oral Tab Take 1 tablet (50 mg total) by mouth daily. 90 tablet 0    Meloxicam 7.5 MG Oral Tab Take 1 tablet (7.5 mg total) by mouth daily. 30 tablet 0    atorvastatin 80 MG Oral Tab Take 1 tablet (80 mg total) by mouth daily. 90 tablet 3    Zolpidem Tartrate ER 12.5 MG Oral Tab CR Take 1 tablet (12.5 mg total) by mouth at bedtime. TAKE ONE TABLET BY MOUTH NIGHTLY AS NEEDED FOR SLEEP 90 tablet 1    Calcipotriene (CALCITRENE) 0.005 % External Ointment Apply to arms and legs each evening. 60 g 2    clobetasol 0.05 % External Ointment Apply to arms and legs each morning 30 g 2    ascorbic acid 100 MG Oral Tab       cyanocobalamin 1000 MCG Oral Tab cyanocobalamin (vit B-12) 1,000 mcg tablet, [RxNorm: 460274] (Patient taking differently: Taking 1-2x per week)      SILDENAFIL CITRATE 100 MG Oral Tab TAKE 1 TABLET BY MOUTH EVERY DAY AS NEEDED 30 tablet 1    azelastine 0.1 % Nasal Solution       pantoprazole 40 MG Oral Tab EC Take 1 tablet (40 mg total) by mouth 2 (two) times daily. (Patient taking differently: Take 1 tablet (40 mg  total) by mouth every morning before breakfast.)      Multiple Vitamin (ONE-DAILY MULTI VITAMINS) Oral Tab Take 1 tablet by mouth in the morning.      Cholecalciferol (VITAMIN D) 50 MCG (2000 UT) Oral Cap Take by mouth.

## 2025-07-23 NOTE — PROGRESS NOTES
Patient here for 1 year f/u for h/o prostate cancer and right breast cancer. Patient denies dyspnea, cough or fever. Patient is scheduled for male pelvic sling surgery on 8/11 at [facility]. Patient has no current complaints or concerns at this time.     Education Record    Learner:  Patient    Disease / Diagnosis: h/o breast and prostate cancer    Barriers / Limitations:  None   Comments:    Method:  Discussion   Comments:    General Topics:  Medication, Side effects and symptom management, and Plan of care reviewed   Comments:    Outcome:  Shows understanding   Comments:

## 2025-07-24 RX ORDER — MELOXICAM 7.5 MG/1
7.5 TABLET ORAL DAILY
Qty: 90 TABLET | Refills: 0 | Status: SHIPPED | OUTPATIENT
Start: 2025-07-24

## 2025-07-24 NOTE — TELEPHONE ENCOUNTER
Refill passed per Clinic protocol.  Requested Prescriptions   Pending Prescriptions Disp Refills    MELOXICAM 7.5 MG Oral Tab [Pharmacy Med Name: Meloxicam Oral Tablet 7.5 MG] 30 tablet 0     Sig: TAKE 1 TABLET BY MOUTH EVERY DAY       Non-Narcotic Pain Medication Protocol Passed - 7/24/2025  3:00 PM        Passed - In person appointment or virtual visit in the past 6 mos or appointment in next 3 mos     Recent Outpatient Visits              Yesterday Malignant neoplasm of central portion of right breast in male, estrogen receptor positive (HCC)    [facility] Hematology Oncology [place] Willem Steele MD    Office Visit    3 weeks ago Actinic keratosis    LILLIAM YOUNG James, MD    Office Visit    3 weeks ago Acute non-recurrent maxillary sinusitis    [facility] Chavez Chapin NP    Office Visit    3 weeks ago     [facility] EEG    Nurse Only    3 weeks ago [facility] EEG    Nurse Only          Future Appointments         Provider Department Appt Notes    In 2 weeks Eliecer Torres MD [facility] EMG BLE & LUE    In 4 weeks Raj Woodward MD [facility] 3 mon f/u    In 1 month Huber Chan MD [facility], [facility] 3 month f.up    In 1 month Cesar Arnold MD [facility]     In 2 months Cande Zimmerman MD [facility] yearly    In 3 months Cesar Arnold MD [facility]     In 4 months Raj Woodward MD [facility] 1yr sleep follow up    In 4 months Ramón Alex MD [facility] 6 mo fu    In 4 months Jeff Cardenas DPM [facility]     In 5  months Ruba Richards, DO [facility], [facility]                    Passed - Medication is active on med list

## 2025-08-07 ENCOUNTER — PROCEDURE VISIT (OUTPATIENT)
Dept: PHYSICAL MEDICINE AND REHAB | Facility: CLINIC | Age: 70
End: 2025-08-07

## 2025-08-07 DIAGNOSIS — G62.9 PERIPHERAL POLYNEUROPATHY: Primary | ICD-10-CM

## 2025-08-07 PROCEDURE — 95910 NRV CNDJ TEST 7-8 STUDIES: CPT | Performed by: PHYSICAL MEDICINE & REHABILITATION

## 2025-08-07 PROCEDURE — 95886 MUSC TEST DONE W/N TEST COMP: CPT | Performed by: PHYSICAL MEDICINE & REHABILITATION

## 2025-08-13 ENCOUNTER — APPOINTMENT (OUTPATIENT)
Facility: LOCATION | Age: 70
End: 2025-08-13
Attending: INTERNAL MEDICINE

## 2025-08-14 DIAGNOSIS — G62.9 POLYNEUROPATHY: Primary | ICD-10-CM

## 2025-08-18 ENCOUNTER — OFFICE VISIT (OUTPATIENT)
Facility: LOCATION | Age: 70
End: 2025-08-18
Attending: INTERNAL MEDICINE

## 2025-08-18 ENCOUNTER — NURSE ONLY (OUTPATIENT)
Facility: LOCATION | Age: 70
End: 2025-08-18
Attending: INTERNAL MEDICINE

## 2025-08-18 DIAGNOSIS — Z85.46 HISTORY OF PROSTATE CANCER: ICD-10-CM

## 2025-08-18 DIAGNOSIS — Z80.3 FAMILY HISTORY OF BREAST CANCER: ICD-10-CM

## 2025-08-18 DIAGNOSIS — Z80.49 FAMILY HISTORY OF MALIGNANT NEOPLASM OF UTERUS: ICD-10-CM

## 2025-08-18 DIAGNOSIS — Z85.3 HISTORY OF BREAST CANCER IN MALE: Primary | ICD-10-CM

## 2025-08-18 DIAGNOSIS — Z80.0 FAMILY HISTORY OF COLON CANCER: ICD-10-CM

## 2025-08-18 DIAGNOSIS — G62.9 POLYNEUROPATHY: ICD-10-CM

## 2025-08-18 DIAGNOSIS — Z85.820 HISTORY OF MALIGNANT MELANOMA OF SKIN: ICD-10-CM

## 2025-08-19 ENCOUNTER — TELEPHONE (OUTPATIENT)
Dept: NEUROLOGY | Facility: CLINIC | Age: 70
End: 2025-08-19

## 2025-08-19 LAB
ANTI-HU AB: NEGATIVE
ANTI-RI AB: NEGATIVE
ANTI-YO AB: NEGATIVE

## 2025-08-21 ENCOUNTER — OFFICE VISIT (OUTPATIENT)
Facility: CLINIC | Age: 70
End: 2025-08-21

## 2025-08-21 VITALS
BODY MASS INDEX: 30.35 KG/M2 | OXYGEN SATURATION: 99 % | TEMPERATURE: 99 F | HEART RATE: 73 BPM | SYSTOLIC BLOOD PRESSURE: 140 MMHG | HEIGHT: 70 IN | DIASTOLIC BLOOD PRESSURE: 80 MMHG | WEIGHT: 212 LBS | RESPIRATION RATE: 16 BRPM

## 2025-08-21 DIAGNOSIS — G47.33 OSA (OBSTRUCTIVE SLEEP APNEA): Primary | ICD-10-CM

## 2025-08-21 DIAGNOSIS — J30.1 SEASONAL ALLERGIC RHINITIS DUE TO POLLEN: ICD-10-CM

## 2025-08-21 DIAGNOSIS — F51.01 PRIMARY INSOMNIA: ICD-10-CM

## 2025-08-21 PROCEDURE — 99214 OFFICE O/P EST MOD 30 MIN: CPT | Performed by: INTERNAL MEDICINE

## 2025-08-21 PROCEDURE — 3077F SYST BP >= 140 MM HG: CPT | Performed by: INTERNAL MEDICINE

## 2025-08-21 PROCEDURE — 3008F BODY MASS INDEX DOCD: CPT | Performed by: INTERNAL MEDICINE

## 2025-08-21 PROCEDURE — 3079F DIAST BP 80-89 MM HG: CPT | Performed by: INTERNAL MEDICINE

## 2025-08-21 RX ORDER — ACETAMINOPHEN 325 MG/1
650 TABLET ORAL EVERY 6 HOURS PRN
COMMUNITY
Start: 2025-08-12

## 2025-08-21 RX ORDER — OXYCODONE HYDROCHLORIDE 5 MG/1
5 TABLET ORAL EVERY 6 HOURS PRN
COMMUNITY
Start: 2025-08-12 | End: 2025-08-25 | Stop reason: ALTCHOICE

## 2025-08-21 RX ORDER — METOPROLOL TARTRATE 100 MG/1
TABLET ORAL
COMMUNITY
Start: 2025-07-11 | End: 2025-08-25 | Stop reason: ALTCHOICE

## 2025-08-21 RX ORDER — HYDROCHLOROTHIAZIDE 12.5 MG/1
TABLET ORAL
COMMUNITY
Start: 2025-07-11

## 2025-08-25 ENCOUNTER — OFFICE VISIT (OUTPATIENT)
Facility: CLINIC | Age: 70
End: 2025-08-25

## 2025-08-25 VITALS — HEART RATE: 68 BPM | SYSTOLIC BLOOD PRESSURE: 138 MMHG | DIASTOLIC BLOOD PRESSURE: 88 MMHG | RESPIRATION RATE: 16 BRPM

## 2025-08-25 DIAGNOSIS — G62.9 POLYNEUROPATHY: Primary | ICD-10-CM

## 2025-08-25 PROCEDURE — 3075F SYST BP GE 130 - 139MM HG: CPT | Performed by: OTHER

## 2025-08-25 PROCEDURE — 3079F DIAST BP 80-89 MM HG: CPT | Performed by: OTHER

## 2025-08-25 PROCEDURE — 99214 OFFICE O/P EST MOD 30 MIN: CPT | Performed by: OTHER

## 2025-08-26 ENCOUNTER — GENETICS ENCOUNTER (OUTPATIENT)
Facility: LOCATION | Age: 70
End: 2025-08-26

## 2025-08-26 DIAGNOSIS — M19.90 INFLAMMATORY ARTHRITIS: ICD-10-CM

## 2025-08-26 DIAGNOSIS — Z13.71 BRCA GENE MUTATION NEGATIVE IN MALE: Primary | ICD-10-CM

## 2025-08-27 RX ORDER — COLCHICINE 0.6 MG/1
0.6 TABLET ORAL DAILY
Qty: 90 TABLET | Refills: 0 | Status: SHIPPED | OUTPATIENT
Start: 2025-08-27

## 2025-09-10 ENCOUNTER — APPOINTMENT (OUTPATIENT)
Facility: LOCATION | Age: 70
End: 2025-09-10
Attending: INTERNAL MEDICINE

## (undated) DIAGNOSIS — M19.90 INFLAMMATORY ARTHRITIS: ICD-10-CM

## (undated) DIAGNOSIS — M47.819 SPONDYLOARTHRITIS: ICD-10-CM

## (undated) DIAGNOSIS — D72.819 LEUKOPENIA, UNSPECIFIED TYPE: ICD-10-CM

## (undated) DIAGNOSIS — D80.4 SELECTIVE IGM DEFICIENCY (HCC): ICD-10-CM

## (undated) DIAGNOSIS — M19.90 INFLAMMATORY ARTHRITIS: Primary | ICD-10-CM

## (undated) DIAGNOSIS — Z79.899 HIGH RISK MEDICATION USE: ICD-10-CM

## (undated) DEVICE — SOLUTION  .9 1000ML BTL

## (undated) DEVICE — SHEET, DRAPE, SPLIT, STERILE: Brand: MEDLINE

## (undated) DEVICE — ATTAHJA VAC WITH 22MM CONNECTR

## (undated) DEVICE — STERILE POLYISOPRENE POWDER-FREE SURGICAL GLOVES: Brand: PROTEXIS

## (undated) DEVICE — HEMOCLIP HORIZON MED 002200

## (undated) DEVICE — STERILE HOOK LOCK LATEX FREE ELASTIC BANDAGE 4INX5YD: Brand: HOOK LOCK™

## (undated) DEVICE — MEDI-VAC SUCTION HANDLE REGULAR CAPACITY: Brand: CARDINAL HEALTH

## (undated) DEVICE — LAPAROTOMY SPONGE - RF AND X-RAY DETECTABLE PRE-WASHED: Brand: SITUATE

## (undated) DEVICE — VIOLET BRAIDED (POLYGLACTIN 910), SYNTHETIC ABSORBABLE SUTURE: Brand: COATED VICRYL

## (undated) DEVICE — PAD SACRAL PREMIUM 12X12X1

## (undated) DEVICE — BANDAGE,GAUZE,BULKEE II,4.5"X4.1YD,STRL: Brand: MEDLINE

## (undated) DEVICE — MEGADYNE E-Z CLEAN BLADE 2.75"

## (undated) DEVICE — SUT MONOCRYL 4-0 PS-2 Y496G

## (undated) DEVICE — NON-ADHERENT PAD PREPACK: Brand: TELFA

## (undated) DEVICE — SUT PROLENE 2-0 SH 8833H

## (undated) DEVICE — SPONGE RAYTEC 4X4 RF DETECT

## (undated) DEVICE — SUT VICRYL 3-0 SH J416H

## (undated) DEVICE — ELECTRODE EDGE PENCIL 10FT

## (undated) DEVICE — SUT SILK 2-0 SH K833H

## (undated) DEVICE — STERILE SYNTHETIC POLYISOPRENE POWDER-FREE SURGICAL GLOVES WITH HYDROGEL COATING, SMOOTH FINISH, STRAIGHT FINGER: Brand: PROTEXIS

## (undated) DEVICE — STANDARD HYPODERMIC NEEDLE,POLYPROPYLENE HUB: Brand: MONOJECT

## (undated) DEVICE — DRAPE,T,LAPARO,TRANS,STERILE: Brand: MEDLINE

## (undated) DEVICE — LIGHT HANDLE

## (undated) DEVICE — #10 STERILE DISPOSABLE SCALPEL: Brand: DISPOSABLE SCALPEL

## (undated) DEVICE — SLEEVE KENDALL SCD EXPRESS MED

## (undated) DEVICE — LIGACLIP MCA MULTIPLE CLIP APPLIERS, 30 MEDIUM CLIPS: Brand: LIGACLIP

## (undated) DEVICE — SYRINGE 10ML LL CONTRL SYRINGE

## (undated) DEVICE — SHEET,DRAPE,40X58,STERILE: Brand: MEDLINE

## (undated) DEVICE — BLADE 24 SS SRG STRL

## (undated) DEVICE — ELECTRODE ESURG 2.75IN EZ CLN

## (undated) DEVICE — 3M™ STERI-STRIP™ REINFORCED ADHESIVE SKIN CLOSURES, R1547, 1/2 IN X 4 IN (12 MM X 100 MM), 6 STRIPS/ENVELOPE: Brand: 3M™ STERI-STRIP™

## (undated) DEVICE — PEN SKIN MARKING REG TIP VIOLT

## (undated) DEVICE — SUT ETHIBOND 2-0 SH X833H

## (undated) DEVICE — HEAD AND NECK CDS-LF: Brand: MEDLINE INDUSTRIES, INC.

## (undated) DEVICE — PROXIMATE SKIN STAPLERS (35 WIDE) CONTAINS 35 STAINLESS STEEL STAPLES (FIXED HEAD): Brand: PROXIMATE

## (undated) DEVICE — E-Z BUTTON SWITCH PENCIL

## (undated) DEVICE — MEGADYNE ELECTRODE ADULT PT RT

## (undated) DEVICE — TOWEL SURG OR 17X30IN BLUE

## (undated) DEVICE — UNDYED BRAIDED (POLYGLACTIN 910), SYNTHETIC ABSORBABLE SUTURE: Brand: COATED VICRYL

## (undated) DEVICE — STOCK SUR W9XL48IN IMPERV

## (undated) DEVICE — SYRINGE 10ML LL TIP

## (undated) DEVICE — PROVE COVER: Brand: UNBRANDED

## (undated) DEVICE — STERILE HOOK LOCK LATEX FREE ELASTIC BANDAGE 6INX5YD: Brand: HOOK LOCK™

## (undated) DEVICE — CONVERTORS STOCKINETTE: Brand: CONVERTORS

## (undated) NOTE — LETTER
[address] Jona Nieves  [place] [facility] [address]  [phone]     PATIENT NAME: Jean Carlos Falcon   : 10/5/1955       Waiver      DATE: 2022     Dear Patient,    Thank you for choosing [facility] as your health care provider. Your physician has deemed the following medical service(s) necessary. However, your insurance plan may not pay for all of your health care and costs and may deny payment for this service. The fact that your insurance plan does not pay for an item or service does not mean you should not receive it. The purpose of this form is to help you make an informed decision about whether or not you want to receive this service(s) that may not be paid for by your insurance plan. ESTIMATED COST: Shingrix: $215  Administration: $48  Total: $263        I understand that the above mentioned service(s) or supply may not be covered by my insurance company. I agree to be financially responsible for the cost of this service or supply in the event my insurance denies payment as a non-covered benefit.       Patient/Insured Signature: ___________________________________________

## (undated) NOTE — MR AVS SNAPSHOT
After Visit Summary   6/11/2017    Gloria Barnett    MRN: [id number]           Visit Information        Provider Department Dept Phone    6/11/2017  9:00 PM Bed1  [facility] Clinic [phone]      Allergies as of 6/11/2017  Reviewed on: 5/24/2017

## (undated) NOTE — LETTER
21    PATIENT NAME: Edwin Alcantar   : 10/5/1955       Waiver      DATE: 2021     Dear Patient,    Thank you for choosing [facility] as your health care provider.  Your physician has deemed the following medical service(s) necessa

## (undated) NOTE — MR AVS SNAPSHOT
[facility] [place] [address] [place] [address] [phone]               Thank you for choosing us for your health care visit with Christa Champion MD.  We are glad to serve you and happy to provide you with this summa Diagnoses:  PRICILA on CPAP   Order:  Op Referal To Cpap Titration Study          Referral Orders      Normal Orders This Visit    OP REFERAL TO CPAP TITRATION STUDY [[id number] CUSTOM]  Order #:  [id number]         **REFERRAL REQUEST**    Your physician has re atorvastatin 40 MG Tabs   Take 1 tablet (40 mg total) by mouth daily. Commonly known as:  LIPITOR           Cefuroxime Axetil 250 MG Tabs   Take 1 tablet (250 mg total) by mouth 2 (two) times daily.    Commonly known as:  CEFTIN           Tamoxifen [facility]

## (undated) NOTE — ED AVS SNAPSHOT
Mr. Fernández Mary   MRN: [id number]    Department:  [facility] Emergency Department   Date of Visit:  12/8/2018           Disclosure     Insurance plans vary and the physician(s) referred by the ER may not be covered by your plan.  Please contact tell this physician (or your personal doctor if your instructions are to return to your personal doctor) about any new or lasting problems. The primary care or specialist physician will see patients referred from the [facility] Emergency Department.  Wilmar Castro

## (undated) NOTE — LETTER
[facility], [address] [facility] [place] [address] [phone]     PATIENT NAME: Ventura Heard   : 10/5/1955       Waiver      DATE: 2023     Dear Patient,    Thank you for choosing [facility] as your health care provider. Your physician has deemed the following medical service(s) necessary. However, your insurance plan may not pay for all of your health care and costs and may deny payment for this service. The fact that your insurance plan does not pay for an item or service does not mean you should not receive it. The purpose of this form is to help you make an informed decision about whether or not you want to receive this service(s) that may not be paid for by your insurance plan. ESTIMATED COST: Shingrix: $215  Administration: $48  Total: $263          I understand that the above mentioned service(s) or supply may not be covered by my insurance company. I agree to be financially responsible for the cost of this service or supply in the event my insurance denies payment as a non-covered benefit.       Patient/Insured Signature: ___________________________________________

## (undated) NOTE — LETTER
[address] [facility], [address] [facility], [address] [place] [address] Pkw[address]  [phone]          Date: 10/14/2020     Patient Name: Kaden Grissom   :   10/5/1955   Date of Surgery:  11/3/2020      Dear Dr Cierra Zelaya

## (undated) NOTE — MR AVS SNAPSHOT
[facility] [place]  [place],  [address] [place] [address] [phone]               Thank you for choosing us for your health care visit with Lili Clarke MD.  We are glad to serve you and happy to provide you with this summa Assoc Dx:  Mixed hyperlipidemia [E78.2]           Lipid Panel    Complete by: Mar 23, 2017 (Approximate)    Assoc Dx:  Mixed hyperlipidemia [E78.2]           Vitamin D, 25-Hydroxy    Complete by:   Mar 23, 2017 (Approximate)    Assoc Dx:  Vitamin D defici Current Medications          This list is accurate as of: 3/23/17  7:15 PM.  Always use your most recent med list.                Atorvastatin Calcium 40 MG Tabs   Take 1 tablet (40 mg total) by mouth daily.    Commonly known as:  LIPITOR           Tamoxife Eat plenty of protein, keep the fat content low Sugars:  sodas and sports drinks, candies and desserts   Eat plenty of low-fat dairy products High fat meats and dairy   Choose whole grain products Foods high in sodium   Water is best for hydration Fast ángel

## (undated) NOTE — MR AVS SNAPSHOT
After Visit Summary   1/25/2017    [facility]    MRN: [id number]           Diagnoses this Visit     Malignant neoplasm of central portion of right male breast ([facility] [address]    -  Primary       Allergies     Levaquin [Levofloxacin] Swelling    Insomnia; If you've recently had a stay at the Hospital you can access your discharge instructions in [employer] by going to Visits < Admission Summaries.  If you've been to the Emergency Department or your doctor's office, you can view your past visit information in My

## (undated) NOTE — Clinical Note
Hi, Dr. Zenovia Cooks! Hope you are doing well! Very interesting case. Thank you for referring Mr. Romeo Delvalle for rheumatologic evaluation. Please see the discussion portion of my note and let me know if you have any questions.  I will keep you updated on what

## (undated) NOTE — MR AVS SNAPSHOT
After Visit Summary   4/11/2017    Hannah Kaye    MRN: [id number]           Visit Information        Provider Department Dept Phone    4/11/2017  9:00 PM Bed1  [facility] Clinic [phone]      Allergies as of 4/11/2017  Reviewed on: 3/23/2017